# Patient Record
Sex: FEMALE | Race: WHITE | NOT HISPANIC OR LATINO | Employment: OTHER | URBAN - METROPOLITAN AREA
[De-identification: names, ages, dates, MRNs, and addresses within clinical notes are randomized per-mention and may not be internally consistent; named-entity substitution may affect disease eponyms.]

---

## 2017-01-03 ENCOUNTER — HOSPITAL ENCOUNTER (OUTPATIENT)
Dept: RADIOLOGY | Facility: MEDICAL CENTER | Age: 52
End: 2017-01-03
Attending: INTERNAL MEDICINE
Payer: MEDICAID

## 2017-01-03 ENCOUNTER — HOSPITAL ENCOUNTER (OUTPATIENT)
Dept: LAB | Facility: MEDICAL CENTER | Age: 52
End: 2017-01-03
Attending: INTERNAL MEDICINE
Payer: MEDICAID

## 2017-01-03 DIAGNOSIS — Z12.2 SCREENING FOR MALIGNANT NEOPLASM OF RESPIRATORY ORGAN: ICD-10-CM

## 2017-01-03 LAB
BUN SERPL-MCNC: 13 MG/DL (ref 8–22)
CREAT SERPL-MCNC: 0.94 MG/DL (ref 0.5–1.4)
GFR SERPL CREATININE-BSD FRML MDRD: >60 ML/MIN/1.73 M 2

## 2017-01-03 PROCEDURE — 700117 HCHG RX CONTRAST REV CODE 255: Performed by: INTERNAL MEDICINE

## 2017-01-03 PROCEDURE — 71260 CT THORAX DX C+: CPT

## 2017-01-03 RX ADMIN — IOHEXOL 100 ML: 350 INJECTION, SOLUTION INTRAVENOUS at 17:05

## 2017-01-05 ENCOUNTER — OFFICE VISIT (OUTPATIENT)
Dept: PULMONOLOGY | Facility: HOSPICE | Age: 52
End: 2017-01-05
Payer: MEDICAID

## 2017-01-05 VITALS
TEMPERATURE: 98.4 F | SYSTOLIC BLOOD PRESSURE: 160 MMHG | RESPIRATION RATE: 15 BRPM | WEIGHT: 162 LBS | HEART RATE: 124 BPM | HEIGHT: 65 IN | BODY MASS INDEX: 26.99 KG/M2 | DIASTOLIC BLOOD PRESSURE: 100 MMHG

## 2017-01-05 DIAGNOSIS — R93.89 ABNORMAL CHEST X-RAY: ICD-10-CM

## 2017-01-05 DIAGNOSIS — F17.200 TOBACCO DEPENDENCE: ICD-10-CM

## 2017-01-05 DIAGNOSIS — J42 CHRONIC BRONCHITIS, UNSPECIFIED CHRONIC BRONCHITIS TYPE (HCC): ICD-10-CM

## 2017-01-05 DIAGNOSIS — R91.8 ABNORMAL CT SCAN, LUNG: ICD-10-CM

## 2017-01-05 PROBLEM — J44.9 COPD (CHRONIC OBSTRUCTIVE PULMONARY DISEASE) (HCC): Status: ACTIVE | Noted: 2017-01-05

## 2017-01-05 PROCEDURE — 99204 OFFICE O/P NEW MOD 45 MIN: CPT | Performed by: INTERNAL MEDICINE

## 2017-01-05 RX ORDER — CYCLOBENZAPRINE HCL 10 MG
10 TABLET ORAL 2 TIMES DAILY PRN
COMMUNITY
End: 2018-05-23

## 2017-01-05 RX ORDER — HYDROCODONE BITARTRATE AND ACETAMINOPHEN 10; 325 MG/1; MG/1
1-2 TABLET ORAL EVERY 6 HOURS PRN
Status: ON HOLD | COMMUNITY
End: 2017-11-29

## 2017-01-05 NOTE — MR AVS SNAPSHOT
"        Yuki Riddle   2017 12:20 PM   Office Visit   MRN: [id number]    Department:  [facility]   Dept Phone:  [phone]    Description:  Female : 1965   Provider:  Pastor Dewitt M.D.           Reason for Visit     Establish Care Abnormal CT Results       Allergies as of 2017     Allergen Noted Reactions    Compazine 2008       Aggressive behavior and \"I feel like I'm on cocaine.\"    Sulfa Drugs 2008   Itching    Blotchy spots on chest      You were diagnosed with     Abnormal chest x-ray   [734954]       Abnormal CT scan, lung   [[id number]]       Tobacco dependence   [273306]       Chronic bronchitis, unspecified chronic bronchitis type (HCC)   [4250973]         Vital Signs     Blood Pressure Pulse Temperature Respirations Height Weight    160/100 mmHg 124 36.9 °C (98.4 °F) (Temporal) 15 1.651 m (5' 5\") 73.483 kg (162 lb)    Body Mass Index Last Menstrual Period Smoking Status             26.96 kg/m2 2002 Current Every Day Smoker         Basic Information     Date Of Birth Sex Race Ethnicity Preferred Language    1965 Female White Non- English      Your appointments     2017 10:00 AM   Pulmonary Function Test with PFT-RM3   [facility] (--)    [address]  [address] [address]  [place] [id number] [phone]            2017  2:20 PM   Established Patient Pul with A Rotation   [facility] (--)    [address]  [address]  [place] [id number] [phone]              Problem List              ICD-10-CM Priority Class Noted - Resolved    Foot fracture, left S92.902A   2013 - Present    Type 1 diabetes mellitus with hyperglycemia (HCC) E10.65 High  2013 - Present    Fibromyalgia M79.7 Low  2013 - Present    Tobacco dependence F17.200 Low  2013 - Present    Diabetic foot ulcer (HCC) E11.621, L97.509   2013 - Present    Opiate dependence (HCC) F11.20   2013 - " Present    Acute exacerbation of COPD with asthma ([facility]) J44.1, J45.901 High  3/13/2014 - Present    Intractable nausea and vomiting R11.2 High  2/9/2015 - Present    Volume depletion E86.9 High  2/9/2015 - Present    Hyponatremia E87.1 High  2/9/2015 - Present    Elevated blood pressure I10 Low  2/9/2015 - Present    Peripheral neuropathy ([facility]) G62.9   2/9/2015 - Present    Candida esophagitis ([facility]) B37.81   2/14/2015 - Present    DKA, type 1 ([facility]) E10.10 High  5/13/2016 - Present    Acute narcotic withdrawal ([facility]) F11.23   5/13/2016 - Present    Abnormal chest x-ray R93.8   1/5/2017 - Present    Abnormal CT scan, lung R91.8   1/5/2017 - Present    COPD (chronic obstructive pulmonary disease) ([facility]) J44.9   1/5/2017 - Present      Health Maintenance        Date Due Completion Dates    IMM HEP B VACCINE (1 of 3 - Primary Series) 1965 ---    DIABETES MONOFILAMTENT / LE EXAM 2/15/1967 ---    RETINAL SCREENING 8/15/1984 ---    FASTING LIPID PROFILE 8/15/1984 ---    URINE ACR / MICROALBUMIN 8/15/1984 ---    PAP SMEAR 8/15/1987 ---    MAMMOGRAM 8/15/2006 ---    A1C SCREENING 2/22/2014 8/22/2013, 1/24/2013, 6/20/2009    COLONOSCOPY 8/15/2016 ---    IMM INFLUENZA (1) 9/1/2016 11/18/2013    SERUM CREATININE 1/3/2018 1/3/2017, 5/24/2016, 5/20/2016, 5/19/2016, 5/18/2016, 5/17/2016, 5/16/2016, 5/15/2016, 5/14/2016, 5/14/2016, 5/13/2016, 5/13/2016, 5/13/2016, 2/1/2016, 1/26/2016, 9/22/2015, 6/29/2015, 2/13/2015, 2/12/2015, 2/12/2015, 2/10/2015, 2/9/2015, 1/16/2015, 8/17/2014, 3/15/2014, 3/14/2014, 3/13/2014, 3/12/2014, 11/17/2013, 11/16/2013, 8/26/2013, 8/25/2013, 8/23/2013, 8/22/2013, 8/19/2013, 6/18/2013, 3/26/2013, 1/24/2013, 1/23/2013, 6/20/2009, 12/13/2008, 5/28/2007    IMM DTaP/Tdap/Td Vaccine (2 - Td) 11/22/2024 11/22/2014            Current Immunizations     Influenza Vaccine Pediatric 11/18/2013  9:58 AM    Pneumococcal polysaccharide vaccine (PPSV-23) 3/15/2014  1:04 PM    Tdap Vaccine 11/22/2014 11:26 PM         Below and/or attached are the medications your provider expects you to take. Review all of your home medications and newly ordered medications with your provider and/or pharmacist. Follow medication instructions as directed by your provider and/or pharmacist. Please keep your medication list with you and share with your provider. Update the information when medications are discontinued, doses are changed, or new medications (including over-the-counter products) are added; and carry medication information at all times in the event of emergency situations     Allergies:  COMPAZINE - (reactions not documented)     SULFA DRUGS - Itching               Medications  Valid as of: January 05, 2017 -  1:30 PM    Generic Name Brand Name Tablet Size Instructions for use    BuPROPion HCl (Tab) WELLBUTRIN 100 MG Take 100 mg by mouth every day.        Carisoprodol (Tab) SOMA 350 MG Take 350 mg by mouth every 8 hours as needed for Muscle Spasms.        ClonazePAM (Tab) KLONOPIN 1 MG Take 1 Tab by mouth every 8 hours as needed (anxiety).        CloNIDine HCl (PATCH WEEKLY) CATAPRES 0.2 MG/24HR Apply 1 Patch to skin as directed every 7 days.        Cyclobenzaprine HCl (Tab) FLEXERIL 10 MG Take 10 mg by mouth 3 times a day as needed.        DiazePAM (Tab) VALIUM 10 MG Take 0.5-1 Tabs by mouth 2 times a day as needed for Anxiety. Indications: Feeling Anxious        Gabapentin (Cap) NEURONTIN 300 MG Take 600 mg by mouth 3 times a day.        HYDROcodone Bitartrate (CAPSULE SR 12 HR) HYDROcodone Bitartrate ER 30 MG Take 60 mg by mouth See Admin Instructions. TAKES 1 EVERY HOUR        Hydrocodone-Acetaminophen (Tab) NORCO  MG Take 1-2 Tabs by mouth every 6 hours as needed.        HydrOXYzine HCl (Tab) ATARAX 25 MG Take 25 mg by mouth every 12 hours.        Insulin Glargine (Solution) LANTUS 100 UNIT/ML Inject 20 Units as instructed every evening.        Insulin Lispro (Solution) HUMALOG 100 UNIT/ML Inject 2-10 Units as instructed  5 Times a Day. Unable to provide SS        Insulin Lispro (Solution) HUMALOG 100 UNIT/ML Inject 5 Units as instructed 3 times a day before meals.        Levothyroxine Sodium (Tab) SYNTHROID 112 MCG Take 112 mcg by mouth every day.        Lisinopril (Tab) PRINIVIL, ZESTRIL 40 MG Take 1 Tab by mouth every day.        .                 Medicines prescribed today were sent to:     [address] [address]    Phone: [phone] Fax: [phone]    Open 24 Hours?: No      Medication refill instructions:       If your prescription bottle indicates you have medication refills left, it is not necessary to call your provider’s office. Please contact your pharmacy and they will refill your medication.    If your prescription bottle indicates you do not have any refills left, you may request refills at any time through one of the following ways: The online Bookmate system (except Urgent Care), by calling your provider’s office, or by asking your pharmacy to contact your provider’s office with a refill request. Medication refills are processed only during regular business hours and may not be available until the next business day. Your provider may request additional information or to have a follow-up visit with you prior to refilling your medication.   *Please Note: Medication refills are assigned a new Rx number when refilled electronically. Your pharmacy may indicate that no refills were authorized even though a new prescription for the same medication is available at the pharmacy. Please request the medicine by name with the pharmacy before contacting your provider for a refill.        Your To Do List     Future Labs/Procedures Complete By Expires    AMB PULMONARY FUNCTION TEST/LAB  As directed 1/5/2018    CT-NEEDLE BX-LUNG-MEDIASTINUM LEFT  As directed 1/5/2018    NB-[id number] BASE TO MID-THIGH  As directed 1/5/2018         Bookmate Access Code: [email or web address]  Expires:  2/1/2017  8:51 AM    Paymentust  A secure, online tool to manage your health information     [employer]’s Baokim® is a secure, online tool that connects you to your personalized health information from the privacy of your home -- day or night - making it very easy for you to manage your healthcare. Once the activation process is completed, you can even access your medical information using the Baokim bhumika, which is available for free in the Apple Bhumika store or Google Play store.     Baokim provides the following levels of access (as shown below):   My Chart Features   [facility] Doctor [facility]  Specialists [facility]  Urgent  Care [facility]  Primary Care  Doctor   Email your healthcare team securely and privately 24/7 X X X    Manage appointments: schedule your next appointment; view details of past/upcoming appointments X      Request prescription refills. X      View recent personal medical records, including lab and immunizations X X X X   View health record, including health history, allergies, medications X X X X   Read reports about your outpatient visits, procedures, consult and ER notes X X X X   See your discharge summary, which is a recap of your hospital and/or ER visit that includes your diagnosis, lab results, and care plan. X X       How to register for Baokim:  1. Go to  [email or web address].  2. Click on the Sign Up Now box, which takes you to the New Member Sign Up page. You will need to provide the following information:  a. Enter your Baokim Access Code exactly as it appears at the top of this page. (You will not need to use this code after you’ve completed the sign-up process. If you do not sign up before the expiration date, you must request a new code.)   b. Enter your date of birth.   c. Enter your home email address.   d. Click Submit, and follow the next screen’s instructions.  3. Create a Baokim ID. This will be your Baokim login ID and cannot be changed, so think of one  that is secure and easy to remember.  4. Create a innRoad password. You can change your password at any time.  5. Enter your Password Reset Question and Answer. This can be used at a later time if you forget your password.   6. Enter your e-mail address. This allows you to receive e-mail notifications when new information is available in innRoad.  7. Click Sign Up. You can now view your health information.    For assistance activating your innRoad account, call [phone]

## 2017-01-05 NOTE — PROGRESS NOTES
CC: Abnormal CT scan of the chest    HPI:  51-year-old woman had a CT of the chest performed on 1/3/17 which showed a 2.2 cm left lower lobe spiculated mass and borderline left infrahilar adenopathy, no other infiltrates and no pleural effusions, 1.2 cm triangular shaped soft tissue anterior superior mediastinum consistent with residual thymic tissue but thymic neoplasm not excluded, mild splenomegaly, and surgical absence of gallbladder and dilatation of the biliary tree similar to previous findings and incompletely imaged.    Initial complaints included abd pain. Had URI and was seen at [facility] several times. Ultimately, was found to have the abnormal chest x-ray. Was told that she needed to see a pulmonary doctor.    Tobacco use 34 years, < 1ppd now. No occupational exposure. FH + for colon ca.    Sx include sob, cough, weak hips, sore bones, back pain, pressure in abdomen.            Patient Active Problem List    Diagnosis Date Noted   • DKA, type 1 ([facility]) 05/13/2016     Priority: High   • Intractable nausea and vomiting 02/09/2015     Priority: High   • Volume depletion 02/09/2015     Priority: High   • Hyponatremia 02/09/2015     Priority: High   • Acute exacerbation of COPD with asthma ([facility]) 03/13/2014     Priority: High   • Type 1 diabetes mellitus with hyperglycemia ([facility]) 01/23/2013     Priority: High   • Elevated blood pressure 02/09/2015     Priority: Low   • Tobacco dependence 08/26/2013     Priority: Low   • Fibromyalgia 08/23/2013     Priority: Low   • Abnormal chest x-ray 01/05/2017   • Abnormal CT scan, lung 01/05/2017   • COPD (chronic obstructive pulmonary disease) ([facility]) 01/05/2017   • Acute narcotic withdrawal ([facility]) 05/13/2016   • Candida esophagitis ([facility]) 02/14/2015   • Peripheral neuropathy ([facility]) 02/09/2015   • Opiate dependence ([facility]) 11/17/2013   • Diabetic foot ulcer ([facility]) 11/16/2013   • Foot fracture, left 01/23/2013       Past Medical History   Diagnosis Date   • Arthritis    • Fibromyalgia     • Diabetic retinopathy    • Neuropathy (HCC)    • Migraine    • Diabetes      juvenile, type I   • TIA (transient ischemic attack)    • Bronchitis    • Rheumatoid arthritis (HCC)         Past Surgical History   Procedure Laterality Date   • Tonsillectomy     • Cholecystectomy     • Primary c section     • Other       Skin disorder of unknown name   • Foreign body removal  6/18/2013     Performed by Raz Mari M.D. at SURGERY SAME DAY [facility]   • Gastroscopy with balloon dilatation  2/13/2015     Performed by Venancio Aburto M.D. at SURGERY [facility]   • Gastroscopy with biopsy  2/13/2015     Performed by Venancio Aburto M.D. at SURGERY [facility]       Family History   Problem Relation Age of Onset   • No Known Problems Sister    • No Known Problems Brother    • No Known Problems Brother    • No Known Problems Daughter    • No Known Problems Daughter        Social History     Social History   • Marital Status:      Spouse Name: N/A   • Number of Children: N/A   • Years of Education: N/A     Occupational History   • Not on file.     Social History Main Topics   • Smoking status: Current Every Day Smoker -- 0.50 packs/day for 29 years     Types: Cigarettes   • Smokeless tobacco: Never Used      Comment: 1/2 PPD   • Alcohol Use: No   • Drug Use: No   • Sexual Activity: Not on file     Other Topics Concern   • Not on file     Social History Narrative       Current Outpatient Prescriptions   Medication Sig Dispense Refill   • insulin glargine (LANTUS) 100 UNIT/ML Solution Inject 20 Units as instructed every evening. 10 mL 3   • insulin lispro (HUMALOG) 100 UNIT/ML Solution Inject 5 Units as instructed 3 times a day before meals. 10 mL 3   • clonidine (CATAPRES) 0.2 MG/24HR PATCH WEEKLY Apply 1 Patch to skin as directed every 7 days. 4 Patch 3   • lisinopril (PRINIVIL, ZESTRIL) 40 MG tablet Take 1 Tab by mouth every day. 30 Tab 2   • clonazepam (KLONOPIN) 1 MG Tab Take 1 Tab  "by mouth every 8 hours as needed (anxiety). 15 Tab 0   • carisoprodol (SOMA) 350 MG Tab Take 350 mg by mouth every 8 hours as needed for Muscle Spasms.     • buPROPion (WELLBUTRIN) 100 MG Tab Take 100 mg by mouth every day.     • gabapentin (NEURONTIN) 300 MG Cap Take 600 mg by mouth 3 times a day.     • hydrOXYzine (ATARAX) 25 MG Tab Take 25 mg by mouth every 12 hours.     • HYDROcodone Bitartrate ER 30 MG CP12 Take 60 mg by mouth See Admin Instructions. TAKES 1 EVERY HOUR     • levothyroxine (SYNTHROID) 112 MCG TABS Take 112 mcg by mouth every day.     • diazepam (VALIUM) 10 MG tablet Take 0.5-1 Tabs by mouth 2 times a day as needed for Anxiety. Indications: Feeling Anxious 30 Each 0   • insulin lispro (HUMALOG) 100 UNIT/ML SOLN Inject 2-10 Units as instructed 5 Times a Day. Unable to provide SS       No current facility-administered medications for this visit.    \"CURRENT RX\"    ALLERGIES: Compazine and Sulfa drugs    ROS  Per history of present illness otherwise negative      PHYSICAL EXAM  Accompanied by daughter who was filling out forms  /100 mmHg  Pulse 124  Temp(Src) 36.9 °C (98.4 °F) (Temporal)  Resp 15  Ht 1.651 m (5' 5\")  Wt 73.483 kg (162 lb)  BMI 26.96 kg/m2  LMP 01/01/2002  Appearance: Well-nourished, well-developed, no acute distress; tearful; circumloquacious, speech pressure, anxious  Eyes:  PERRLA, EOMI  Hearing:  Grossly intact  Nose:  Normal, no lesions or deformities, turbinates moist  Oropharynx:  Tongue normal, posterior pharynx without erythema or exudate  Mallampati classification:  2  Neck: Supple, trachea midline, no masses  Respiratory effort:  No intercostal retractions or use of accessory muscles  Lung auscultation:  No wheezes rhonchi rubs or rales  Cardiac auscultation:  No murmurs, rubs, or gallops, no regular rhythm, normal rate  Abdomen:  No tenderness, no organomegaly  Extremities:  No cyanosis, clubbing, edema  Gait and Station:  Normal  Digits and nails: No " clubbing, cyanosis, petechiae, or nodes  Musculoskeletal:  Grossly normal  Skin:  No rashes  Orientation:  Oriented time, place, and person  Mood and affect:  No depression, agitation; anxious  Judgment:  Intact    PROBLEMS:    1. Abnormal chest x-ray      2. Abnormal CT scan, lung      3. Tobacco dependence      4. Chronic bronchitis, unspecified chronic bronchitis type (HCC)    5. Hypertension today, followed by Dr. Bean        PLAN:   51-year-old woman had a CT of the chest performed on 1/3/17 which showed a 2.2 cm LEFT lower lobe spiculated mass and borderline left infrahilar adenopathy, no other infiltrates and no pleural effusions, 1.2 cm triangular shaped soft tissue anterior superior mediastinum consistent with residual thymic tissue but thymic neoplasm not excluded, mild splenomegaly, and surgical absence of gallbladder and dilatation of the biliary tree similar to previous findings and incompletely imaged.    The patient most likely has a primary lung cancer. We will schedule her to have a needle biopsy of the spiculated lesion, a PET scan, and complete PFTs. She'll return thereafter for results review and a comprehensive plan.  Hopefully, she has non-small cell lung cancer, has no lymphadenopathy or distal metastases, and has satisfactory lung function rendering her a surgical candidate.      Return for follow up visit with pulmonary physician.

## 2017-01-06 ENCOUNTER — TELEPHONE (OUTPATIENT)
Dept: PULMONOLOGY | Facility: HOSPICE | Age: 52
End: 2017-01-06

## 2017-01-06 NOTE — TELEPHONE ENCOUNTER
Ivania with [facility] called states that the order/ request for the CT-Needle BX Lung was denied, states that a Bronchoscopy must be tried first and once tried and if failed then they can try the CT-Needle BX lll Lung.  Ivania will scan the denied request into media.  Please advise!

## 2017-01-09 ENCOUNTER — TELEPHONE (OUTPATIENT)
Dept: PULMONOLOGY | Facility: HOSPICE | Age: 52
End: 2017-01-09

## 2017-01-09 DIAGNOSIS — R91.8 ABNORMAL CT SCAN OF LUNG: ICD-10-CM

## 2017-01-09 NOTE — TELEPHONE ENCOUNTER
Pt was ordered a CT guided bx which was denied by the radiologist. They think a Bronchoscopy is more appropriate. Please place order if that's what you want?/ap

## 2017-01-09 NOTE — TELEPHONE ENCOUNTER
[phone]  I called Yuki to let her know her insurance denied the Bx and that we would be doing a Bronch first. I let her know Janis would be contacting her for scheduling. Pt. Informed me she was going to go to the ER due to symptoms of cough is getting worse, neck pain, and feeling like she is drowning.    Please put in an order for Bronch.

## 2017-01-13 ENCOUNTER — TELEPHONE (OUTPATIENT)
Dept: PULMONOLOGY | Facility: HOSPICE | Age: 52
End: 2017-01-13

## 2017-01-13 ENCOUNTER — HOSPITAL ENCOUNTER (OUTPATIENT)
Facility: MEDICAL CENTER | Age: 52
End: 2017-01-13
Attending: INTERNAL MEDICINE | Admitting: INTERNAL MEDICINE
Payer: MEDICAID

## 2017-01-13 ENCOUNTER — HOSPITAL ENCOUNTER (OUTPATIENT)
Dept: RADIOLOGY | Facility: MEDICAL CENTER | Age: 52
End: 2017-01-13
Attending: INTERNAL MEDICINE
Payer: MEDICAID

## 2017-01-13 DIAGNOSIS — R91.8 ABNORMAL CT SCAN, LUNG: ICD-10-CM

## 2017-01-13 PROCEDURE — A9552 F18 FDG: HCPCS

## 2017-01-13 NOTE — TELEPHONE ENCOUNTER
Pt had PET scan today. She is very nervous and would like a call before the weekend. She has a pending Bronch with Sony/david

## 2017-01-13 NOTE — TELEPHONE ENCOUNTER
Contacted patient and reviewed results with her showing no uptake, but understands there is always a change there could be a false negative.  She knows to continue through with current treatment as dicussed at last OV with bronchoscopy and PFT

## 2017-01-18 ENCOUNTER — OFFICE VISIT (OUTPATIENT)
Dept: PULMONOLOGY | Facility: HOSPICE | Age: 52
End: 2017-01-18

## 2017-01-18 VITALS — HEIGHT: 65 IN | BODY MASS INDEX: 26.66 KG/M2 | WEIGHT: 160 LBS

## 2017-01-18 DIAGNOSIS — F17.200 TOBACCO DEPENDENCE: ICD-10-CM

## 2017-01-18 DIAGNOSIS — J42 CHRONIC BRONCHITIS, UNSPECIFIED CHRONIC BRONCHITIS TYPE (HCC): ICD-10-CM

## 2017-01-18 ASSESSMENT — PULMONARY FUNCTION TESTS
FVC_PREDICTED: 3.65
FEV1_PERCENT_PREDICTED: 79
FEV1/FVC: 78
FEV1: 2.35
FEV1_PERCENT_PREDICTED: 81
FEV1/FVC_PERCENT_PREDICTED: 103
FVC: 2.95
FVC_PERCENT_PREDICTED: 79
FVC: 2.91
FEV1_PERCENT_CHANGE: 2
FEV1: 2.29
FEV1/FVC_PERCENT_CHANGE: -200
FEV1/FVC_PERCENT_PREDICTED: 79
FVC_PERCENT_PREDICTED: 80
FEV1_PREDICTED: 2.89
FEV1/FVC_PERCENT_PREDICTED: 99
FEV1/FVC: 80.76
FEV1_PERCENT_CHANGE: -1

## 2017-01-18 NOTE — PROCEDURES
Technician: MARK Gutiérrez    Technician Comment:  Good patient effort & cooperation.  The results of this test meet the ATS/ERS standards for acceptability & reproducibility.  Test was performed on the [employer] Body Plethysmograph-Elite DX system.  Predicted values were [facility]-3 for spirometry, [facility] for DLCO, ITS for Lung Volumes.  The DLCO was uncorrected for Hgb.  A bronchodilator of Ventolin HFA -2puffs via spacer administered.      The FVC is 2.91 L or 79%, FEV1 is 2.35 L or 81%, FEV1/FVC 81%. TLC 97%. DLCO 97%. ERV of 70%.    Interpretation:  No significant obstructive or restrictive ventilatory defects.   Normal gas transfer.  Normal flow volume loop.  Reduced expiratory reserve volume secondary to obesity.

## 2017-01-18 NOTE — MR AVS SNAPSHOT
"        Yuki Riddle   2017 10:00 AM   Office Visit   MRN: [id number]    Department:  [facility]   Dept Phone:  [phone]    Description:  Female : 1965   Provider:  [id number]           Allergies as of 2017     Allergen Noted Reactions    Compazine 2008       Aggressive behavior and \"I feel like I'm on cocaine.\"    Sulfa Drugs 2008   Itching    Blotchy spots on chest      You were diagnosed with     Tobacco dependence   [200238]       Chronic bronchitis, unspecified chronic bronchitis type (CMS-HCC)   [3513115]         Vital Signs     Height Weight Body Mass Index Last Menstrual Period Smoking Status       1.651 m (5' 5\") 72.576 kg (160 lb) 26.63 kg/m2 2002 Current Every Day Smoker       Basic Information     Date Of Birth Sex Race Ethnicity Preferred Language    1965 Female White Non- English      Your appointments     2017 11:20 AM   Established Patient Pul with A Rotation   [facility] (--)    [address] [place] [address] [address]  [facility] [id number] [phone]              Problem List              ICD-10-CM Priority Class Noted - Resolved    Foot fracture, left S92.902A   2013 - Present    Type 1 diabetes mellitus with hyperglycemia (CMS-HCC) E10.65 High  2013 - Present    Fibromyalgia M79.7 Low  2013 - Present    Tobacco dependence F17.200 Low  2013 - Present    Diabetic foot ulcer (CMS-HCC) E11.621, L97.509   2013 - Present    Opiate dependence (CMS-HCC) F11.20   2013 - Present    Acute exacerbation of COPD with asthma (CMS-HCC) J44.1, J45.901 High  3/13/2014 - Present    Intractable nausea and vomiting R11.2 High  2015 - Present    Volume depletion E86.9 High  2015 - Present    Hyponatremia E87.1 High  2015 - Present    Elevated blood pressure I10 Low  2015 - Present    Peripheral neuropathy (CMS-HCC) G62.9   2015 - Present    Candida esophagitis (CMS-HCC) B37.81   " 2/14/2015 - Present    DKA, type 1 (CMS-HCC) E10.10 High  5/13/2016 - Present    Acute narcotic withdrawal (CMS-HCC) F11.23   5/13/2016 - Present    Abnormal chest x-ray R93.8   1/5/2017 - Present    Abnormal CT scan, lung R91.8   1/5/2017 - Present    COPD (chronic obstructive pulmonary disease) (CMS-HCC) J44.9   1/5/2017 - Present      Health Maintenance        Date Due Completion Dates    IMM HEP B VACCINE (1 of 3 - Primary Series) 1965 ---    DIABETES MONOFILAMENT / LE EXAM 2/15/1967 ---    RETINAL SCREENING 8/15/1984 ---    FASTING LIPID PROFILE 8/15/1984 ---    URINE ACR / MICROALBUMIN 8/15/1984 ---    PAP SMEAR 8/15/1987 ---    MAMMOGRAM 8/15/2006 ---    A1C SCREENING 2/22/2014 8/22/2013, 1/24/2013, 6/20/2009    COLONOSCOPY 8/15/2016 ---    IMM INFLUENZA (1) 9/1/2016 11/18/2013    SERUM CREATININE 1/3/2018 1/3/2017, 5/24/2016, 5/20/2016, 5/19/2016, 5/18/2016, 5/17/2016, 5/16/2016, 5/15/2016, 5/14/2016, 5/14/2016, 5/13/2016, 5/13/2016, 5/13/2016, 2/1/2016, 1/26/2016, 9/22/2015, 6/29/2015, 2/13/2015, 2/12/2015, 2/12/2015, 2/10/2015, 2/9/2015, 1/16/2015, 8/17/2014, 3/15/2014, 3/14/2014, 3/13/2014, 3/12/2014, 11/17/2013, 11/16/2013, 8/26/2013, 8/25/2013, 8/23/2013, 8/22/2013, 8/19/2013, 6/18/2013, 3/26/2013, 1/24/2013, 1/23/2013, 6/20/2009, 12/13/2008, 5/28/2007    IMM DTaP/Tdap/Td Vaccine (2 - Td) 11/22/2024 11/22/2014            Current Immunizations     Influenza Vaccine Pediatric 11/18/2013  9:58 AM    Pneumococcal polysaccharide vaccine (PPSV-23) 3/15/2014  1:04 PM    Tdap Vaccine 11/22/2014 11:26 PM      Below and/or attached are the medications your provider expects you to take. Review all of your home medications and newly ordered medications with your provider and/or pharmacist. Follow medication instructions as directed by your provider and/or pharmacist. Please keep your medication list with you and share with your provider. Update the information when medications are discontinued, doses are  changed, or new medications (including over-the-counter products) are added; and carry medication information at all times in the event of emergency situations     Allergies:  COMPAZINE - (reactions not documented)     SULFA DRUGS - Itching               Medications  Valid as of: January 18, 2017 - 10:47 AM    Generic Name Brand Name Tablet Size Instructions for use    BuPROPion HCl (Tab) WELLBUTRIN 100 MG Take 100 mg by mouth every day.        Carisoprodol (Tab) SOMA 350 MG Take 350 mg by mouth every 8 hours as needed for Muscle Spasms.        ClonazePAM (Tab) KLONOPIN 1 MG Take 1 Tab by mouth every 8 hours as needed (anxiety).        CloNIDine HCl (PATCH WEEKLY) CATAPRES 0.2 MG/24HR Apply 1 Patch to skin as directed every 7 days.        Cyclobenzaprine HCl (Tab) FLEXERIL 10 MG Take 10 mg by mouth 3 times a day as needed.        DiazePAM (Tab) VALIUM 10 MG Take 0.5-1 Tabs by mouth 2 times a day as needed for Anxiety. Indications: Feeling Anxious        Gabapentin (Cap) NEURONTIN 300 MG Take 600 mg by mouth 3 times a day.        HYDROcodone Bitartrate (CAPSULE SR 12 HR) HYDROcodone Bitartrate ER 30 MG Take 60 mg by mouth See Admin Instructions. TAKES 1 EVERY HOUR        Hydrocodone-Acetaminophen (Tab) NORCO  MG Take 1-2 Tabs by mouth every 6 hours as needed.        HydrOXYzine HCl (Tab) ATARAX 25 MG Take 25 mg by mouth every 12 hours.        Insulin Glargine (Solution) LANTUS 100 UNIT/ML Inject 20 Units as instructed every evening.        Insulin Lispro (Solution) HUMALOG 100 UNIT/ML Inject 2-10 Units as instructed 5 Times a Day. Unable to provide SS        Insulin Lispro (Solution) HUMALOG 100 UNIT/ML Inject 5 Units as instructed 3 times a day before meals.        Levothyroxine Sodium (Tab) SYNTHROID 112 MCG Take 112 mcg by mouth every day.        Lisinopril (Tab) PRINIVIL, ZESTRIL 40 MG Take 1 Tab by mouth every day.        .                 Medicines prescribed today were sent to:     [facility] [place]  [place] - [address] [address]    Phone: [phone] Fax: [phone]    Open 24 Hours?: No      Medication refill instructions:       If your prescription bottle indicates you have medication refills left, it is not necessary to call your provider’s office. Please contact your pharmacy and they will refill your medication.    If your prescription bottle indicates you do not have any refills left, you may request refills at any time through one of the following ways: The online [employer] system (except Urgent Care), by calling your provider’s office, or by asking your pharmacy to contact your provider’s office with a refill request. Medication refills are processed only during regular business hours and may not be available until the next business day. Your provider may request additional information or to have a follow-up visit with you prior to refilling your medication.   *Please Note: Medication refills are assigned a new Rx number when refilled electronically. Your pharmacy may indicate that no refills were authorized even though a new prescription for the same medication is available at the pharmacy. Please request the medicine by name with the pharmacy before contacting your provider for a refill.           [employer] Access Code: [email or web address]  Expires: 2/1/2017  8:51 AM    [employer]  A secure, online tool to manage your health information     [employer]’s [employer]® is a secure, online tool that connects you to your personalized health information from the privacy of your home -- day or night - making it very easy for you to manage your healthcare. Once the activation process is completed, you can even access your medical information using the [employer] bhumika, which is available for free in the Apple Bhumika store or Google Play store.     [employer] provides the following levels of access (as shown below):   My Chart Features   [facility] Primary Care Doctor [facility]  Specialists [facility]  Urgent  Care  [facility]  Primary Care  Doctor   Email your healthcare team securely and privately 24/7 X X X    Manage appointments: schedule your next appointment; view details of past/upcoming appointments X      Request prescription refills. X      View recent personal medical records, including lab and immunizations X X X X   View health record, including health history, allergies, medications X X X X   Read reports about your outpatient visits, procedures, consult and ER notes X X X X   See your discharge summary, which is a recap of your hospital and/or ER visit that includes your diagnosis, lab results, and care plan. X X       How to register for [employer]:  1. Go to  [email or web address].  2. Click on the Sign Up Now box, which takes you to the New Member Sign Up page. You will need to provide the following information:  a. Enter your [employer] Access Code exactly as it appears at the top of this page. (You will not need to use this code after you’ve completed the sign-up process. If you do not sign up before the expiration date, you must request a new code.)   b. Enter your date of birth.   c. Enter your home email address.   d. Click Submit, and follow the next screen’s instructions.  3. Create a [employer] ID. This will be your [employer] login ID and cannot be changed, so think of one that is secure and easy to remember.  4. Create a [employer] password. You can change your password at any time.  5. Enter your Password Reset Question and Answer. This can be used at a later time if you forget your password.   6. Enter your e-mail address. This allows you to receive e-mail notifications when new information is available in [employer].  7. Click Sign Up. You can now view your health information.    For assistance activating your [employer] account, call [phone]

## 2017-01-20 VITALS — WEIGHT: 162.7 LBS | BODY MASS INDEX: 27.11 KG/M2 | HEIGHT: 65 IN

## 2017-01-20 DIAGNOSIS — Z01.812 PRE-PROCEDURAL LABORATORY EXAMINATION: ICD-10-CM

## 2017-01-20 LAB
ANION GAP SERPL CALC-SCNC: 7 MMOL/L (ref 0–11.9)
BUN SERPL-MCNC: 18 MG/DL (ref 8–22)
CALCIUM SERPL-MCNC: 9.2 MG/DL (ref 8.5–10.5)
CHLORIDE SERPL-SCNC: 104 MMOL/L (ref 96–112)
CO2 SERPL-SCNC: 27 MMOL/L (ref 20–33)
CREAT SERPL-MCNC: 0.86 MG/DL (ref 0.5–1.4)
GFR SERPL CREATININE-BSD FRML MDRD: >60 ML/MIN/1.73 M 2
GLUCOSE SERPL-MCNC: 127 MG/DL (ref 65–99)
POTASSIUM SERPL-SCNC: 4.4 MMOL/L (ref 3.6–5.5)
SODIUM SERPL-SCNC: 138 MMOL/L (ref 135–145)

## 2017-01-20 PROCEDURE — 80048 BASIC METABOLIC PNL TOTAL CA: CPT

## 2017-01-20 PROCEDURE — 36415 COLL VENOUS BLD VENIPUNCTURE: CPT

## 2017-01-20 RX ORDER — HYDROCODONE BITARTRATE 50 MG/1
CAPSULE, EXTENDED RELEASE ORAL EVERY 12 HOURS
COMMUNITY
End: 2017-01-24 | Stop reason: HOSPADM

## 2017-01-21 NOTE — OR NURSING
Here for pre-admit. Patient is a very brittle diabetic, under the care of Dr. Quesada.  Procedure is at 1pm, coming in at 11. Katiuska is very concerned about sugars being out of control.  I called Emelina at [facility], she will have anesthesiologist call patient night before procedure.

## 2017-02-03 ENCOUNTER — OFFICE VISIT (OUTPATIENT)
Dept: PULMONOLOGY | Facility: HOSPICE | Age: 52
End: 2017-02-03
Payer: MEDICAID

## 2017-02-03 VITALS
HEIGHT: 65 IN | RESPIRATION RATE: 16 BRPM | BODY MASS INDEX: 27.52 KG/M2 | SYSTOLIC BLOOD PRESSURE: 138 MMHG | TEMPERATURE: 98.2 F | OXYGEN SATURATION: 94 % | HEART RATE: 93 BPM | WEIGHT: 165.2 LBS | DIASTOLIC BLOOD PRESSURE: 82 MMHG

## 2017-02-03 DIAGNOSIS — E10.65 TYPE 1 DIABETES MELLITUS WITH HYPERGLYCEMIA (HCC): ICD-10-CM

## 2017-02-03 DIAGNOSIS — R91.8 ABNORMAL CT SCAN, LUNG: ICD-10-CM

## 2017-02-03 DIAGNOSIS — R93.89 ABNORMAL CHEST X-RAY: ICD-10-CM

## 2017-02-03 DIAGNOSIS — J42 CHRONIC BRONCHITIS, UNSPECIFIED CHRONIC BRONCHITIS TYPE (HCC): ICD-10-CM

## 2017-02-03 PROCEDURE — 99214 OFFICE O/P EST MOD 30 MIN: CPT | Performed by: INTERNAL MEDICINE

## 2017-02-03 NOTE — MR AVS SNAPSHOT
"        Yuki Riddle   2/3/2017 11:20 AM   Office Visit   MRN: [id number]    Department:  [facility]   Dept Phone:  [phone]    Description:  Female : 1965   Provider:  Pastor Dewitt M.D.           Reason for Visit     Follow-Up           Allergies as of 2/3/2017     Allergen Noted Reactions    Compazine 2008       Aggressive behavior     Sulfa Drugs 2008   Itching    Blotchy spots on chest      You were diagnosed with     Abnormal CT scan, lung   [373716]       Abnormal chest x-ray   [[id number]]       Chronic bronchitis, unspecified chronic bronchitis type (CMS-HCC)   [8711008]       Type 1 diabetes mellitus with hyperglycemia (CMS-HCC)   [800071]         Vital Signs     Blood Pressure Pulse Temperature Respirations Height Weight    138/82 mmHg 93 36.8 °C (98.2 °F) 16 1.651 m (5' 5\") 74.934 kg (165 lb 3.2 oz)    Body Mass Index Oxygen Saturation Last Menstrual Period Smoking Status          27.49 kg/m2 94% 2002 Current Every Day Smoker        Basic Information     Date Of Birth Sex Race Ethnicity Preferred Language    1965 Female White Non- English      Problem List              ICD-10-CM Priority Class Noted - Resolved    Foot fracture, left S92.902A   2013 - Present    Type 1 diabetes mellitus with hyperglycemia (CMS-HCC) E10.65 High  2013 - Present    Fibromyalgia M79.7 Low  2013 - Present    Tobacco dependence F17.200 Low  2013 - Present    Diabetic foot ulcer (CMS-HCC) E11.621, L97.509   2013 - Present    Opiate dependence (CMS-HCC) F11.20   2013 - Present    Acute exacerbation of COPD with asthma (CMS-HCC) J44.1, J45.901 High  3/13/2014 - Present    Intractable nausea and vomiting R11.2 High  2015 - Present    Volume depletion E86.9 High  2015 - Present    Hyponatremia E87.1 High  2015 - Present    Elevated blood pressure I10 Low  2015 - Present    Peripheral neuropathy (CMS-HCC) G62.9   2015 - " Present    Candida esophagitis (CMS-HCC) B37.81   2/14/2015 - Present    DKA, type 1 (CMS-HCC) E10.10 High  5/13/2016 - Present    Acute narcotic withdrawal (CMS-HCC) F11.23   5/13/2016 - Present    Abnormal chest x-ray R93.8   1/5/2017 - Present    Abnormal CT scan, lung R91.8   1/5/2017 - Present    COPD (chronic obstructive pulmonary disease) (CMS-HCC) J44.9   1/5/2017 - Present      Health Maintenance        Date Due Completion Dates    IMM HEP B VACCINE (1 of 3 - Primary Series) 1965 ---    DIABETES MONOFILAMENT / LE EXAM 2/15/1967 ---    RETINAL SCREENING 8/15/1984 ---    FASTING LIPID PROFILE 8/15/1984 ---    URINE ACR / MICROALBUMIN 8/15/1984 ---    PAP SMEAR 8/15/1987 ---    MAMMOGRAM 8/15/2006 ---    A1C SCREENING 2/22/2014 8/22/2013, 1/24/2013, 6/20/2009    COLONOSCOPY 8/15/2016 ---    IMM INFLUENZA (1) 9/1/2016 11/18/2013    SERUM CREATININE 1/20/2018 1/20/2017, 1/3/2017, 5/24/2016, 5/20/2016, 5/19/2016, 5/18/2016, 5/17/2016, 5/16/2016, 5/15/2016, 5/14/2016, 5/14/2016, 5/13/2016, 5/13/2016, 5/13/2016, 2/1/2016, 1/26/2016, 9/22/2015, 6/29/2015, 2/13/2015, 2/12/2015, 2/12/2015, 2/10/2015, 2/9/2015, 1/16/2015, 8/17/2014, 3/15/2014, 3/14/2014, 3/13/2014, 3/12/2014, 11/17/2013, 11/16/2013, 8/26/2013, 8/25/2013, 8/23/2013, 8/22/2013, 8/19/2013, 6/18/2013, 3/26/2013, 1/24/2013, 1/23/2013, 6/20/2009, 12/13/2008, 5/28/2007    IMM DTaP/Tdap/Td Vaccine (2 - Td) 11/22/2024 11/22/2014            Current Immunizations     Influenza Vaccine Pediatric 11/18/2013  9:58 AM    Pneumococcal polysaccharide vaccine (PPSV-23) 3/15/2014  1:04 PM    Tdap Vaccine 11/22/2014 11:26 PM      Below and/or attached are the medications your provider expects you to take. Review all of your home medications and newly ordered medications with your provider and/or pharmacist. Follow medication instructions as directed by your provider and/or pharmacist. Please keep your medication list with you and share with your provider. Update the  information when medications are discontinued, doses are changed, or new medications (including over-the-counter products) are added; and carry medication information at all times in the event of emergency situations     Allergies:  COMPAZINE - (reactions not documented)     SULFA DRUGS - Itching               Medications  Valid as of: February 03, 2017 - 11:54 AM    Generic Name Brand Name Tablet Size Instructions for use    BuPROPion HCl (Tab) WELLBUTRIN 100 MG Take 100 mg by mouth every day.        Carisoprodol (Tab) SOMA 350 MG Take 350 mg by mouth every 8 hours as needed for Muscle Spasms.        ClonazePAM (Tab) KLONOPIN 1 MG Take 1 Tab by mouth every 8 hours as needed (anxiety).        Cyclobenzaprine HCl (Tab) FLEXERIL 10 MG Take 10 mg by mouth 3 times a day as needed.        Gabapentin (Cap) NEURONTIN 300 MG Take 600 mg by mouth 3 times a day.        Hydrocodone-Acetaminophen (Tab) NORCO  MG Take 1-2 Tabs by mouth every 6 hours as needed.        HydrOXYzine HCl (Tab) ATARAX 25 MG Take 25 mg by mouth every 12 hours.        Insulin Lispro (Solution) HUMALOG 100 UNIT/ML Inject 2-10 Units as instructed 5 Times a Day. Unable to provide SS        .                 Medicines prescribed today were sent to:     [facility] - [place] - [address] [place], [place] [address]    [address] [address]    Phone: [phone] Fax: [phone]    Open 24 Hours?: No      Medication refill instructions:       If your prescription bottle indicates you have medication refills left, it is not necessary to call your provider’s office. Please contact your pharmacy and they will refill your medication.    If your prescription bottle indicates you do not have any refills left, you may request refills at any time through one of the following ways: The online [employer] system (except Urgent Care), by calling your provider’s office, or by asking your pharmacy to contact your provider’s office with a refill  request. Medication refills are processed only during regular business hours and may not be available until the next business day. Your provider may request additional information or to have a follow-up visit with you prior to refilling your medication.   *Please Note: Medication refills are assigned a new Rx number when refilled electronically. Your pharmacy may indicate that no refills were authorized even though a new prescription for the same medication is available at the pharmacy. Please request the medicine by name with the pharmacy before contacting your provider for a refill.        Your To Do List     Future Labs/Procedures Complete By Expires    CT-CHEST (THORAX) W/O  5/3/2017 (Approximate) 2/3/2018         MyChart Status: Patient Declined

## 2017-02-03 NOTE — PROGRESS NOTES
CC: PET scan result      HPI:  Seen in early January:  51-year-old woman had a CT of the chest performed on 1/3/17 which showed a 2.2 cm left lower lobe spiculated mass and borderline left infrahilar adenopathy, no other infiltrates and no pleural effusions, 1.2 cm triangular shaped soft tissue anterior superior mediastinum consistent with residual thymic tissue but thymic neoplasm not excluded, mild splenomegaly, and surgical absence of gallbladder and dilatation of the biliary tree similar to previous findings and incompletely imaged.    Initial complaints included abd pain. Had URI and was seen at [facility] several times. Ultimately, was found to have the abnormal chest x-ray. Was told that she needed to see a pulmonary doctor.    Tobacco use 34 years, < 1ppd now. No occupational exposure. FH + for colon ca.    Sx include sob, cough, weak hips, sore bones, back pain, pressure in abdomen.    PET/CT was performed on 1/13:  1. No evidence of abnormal FDG accumulation.  2. Smaller nodular opacity in the left lower lobe does not demonstrate uptake, likely resolving infection/inflammation. Continued follow-up with CT to ensure complete resolution.         Accompanied today by her daughter and mother. Reviewed PFTs. Review of the PET scan. The patient is concerned that the PET scan represents a false negative. However in that there was no abnormal FDG accumulation and the lesion was smaller I suspect that this is a true negative present false negative. We will need to follow the CT scan to ensure that the lesion continues to resolve or at least does not enlarge.                              Patient Active Problem List    Diagnosis Date Noted   • DKA, type 1 ([facility]) 05/13/2016     Priority: High   • Intractable nausea and vomiting 02/09/2015     Priority: High   • Volume depletion 02/09/2015     Priority: High   • Hyponatremia 02/09/2015     Priority: High   • Acute exacerbation of COPD with asthma (CMS-HCC) 03/13/2014      Priority: High   • Type 1 diabetes mellitus with hyperglycemia (CMS-HCC) 01/23/2013     Priority: High   • Elevated blood pressure 02/09/2015     Priority: Low   • Tobacco dependence 08/26/2013     Priority: Low   • Fibromyalgia 08/23/2013     Priority: Low   • Abnormal chest x-ray 01/05/2017   • Abnormal CT scan, lung 01/05/2017   • COPD (chronic obstructive pulmonary disease) (CMS-HCC) 01/05/2017   • Acute narcotic withdrawal (CMS-HCC) 05/13/2016   • Candida esophagitis (CMS-HCC) 02/14/2015   • Peripheral neuropathy (CMS-HCC) 02/09/2015   • Opiate dependence (CMS-HCC) 11/17/2013   • Diabetic foot ulcer (CMS-HCC) 11/16/2013   • Foot fracture, left 01/23/2013       Past Medical History   Diagnosis Date   • Fibromyalgia    • Diabetic retinopathy    • Neuropathy (CMS-HCC)    • Migraine    • Diabetes      juvenile, type I   • TIA (transient ischemic attack)    • Urinary incontinence    • Hypertension    • Heart burn    • Indigestion    • Urinary bladder disorder    • Breath shortness    • Cataract    • Psychiatric problem    • Bowel habit changes      diarrhea   • Dental disorder      upper and lower dentures   • Asthma      no inhalers   • Bronchitis 12/2016   • Pneumonia 1993   • Cold 12/3/2016   • Sleep apnea      cpap ordered, doesn't use   • Arthritis    • Rheumatoid arthritis (CMS-HCC)         Past Surgical History   Procedure Laterality Date   • Tonsillectomy     • Cholecystectomy     • Primary c section     • Other       Skin disorder of unknown name   • Foreign body removal  6/18/2013     Performed by Raz Mari M.D. at [facility]   • Gastroscopy with balloon dilatation  2/13/2015     Performed by Venancio Aburto M.D. at [facility]   • Gastroscopy with biopsy  2/13/2015     Performed by Venancio Aburto M.D. at [facility]       Family History   Problem Relation Age of Onset   • No Known Problems Sister    • No Known Problems Brother    • No Known  "Problems Brother    • No Known Problems Daughter    • No Known Problems Daughter        Social History     Social History   • Marital Status:      Spouse Name: N/A   • Number of Children: N/A   • Years of Education: N/A     Occupational History   • Not on file.     Social History Main Topics   • Smoking status: Current Every Day Smoker -- 0.50 packs/day for 29 years     Types: Cigarettes   • Smokeless tobacco: Never Used      Comment: 1/2 PPD   • Alcohol Use: No   • Drug Use: No   • Sexual Activity: Not on file     Other Topics Concern   • Not on file     Social History Narrative       Current Outpatient Prescriptions   Medication Sig Dispense Refill   • cyclobenzaprine (FLEXERIL) 10 MG Tab Take 10 mg by mouth 3 times a day as needed.     • hydrocodone/acetaminophen (NORCO)  MG Tab Take 1-2 Tabs by mouth every 6 hours as needed.     • carisoprodol (SOMA) 350 MG Tab Take 350 mg by mouth every 8 hours as needed for Muscle Spasms.     • gabapentin (NEURONTIN) 300 MG Cap Take 600 mg by mouth 3 times a day.     • hydrOXYzine (ATARAX) 25 MG Tab Take 25 mg by mouth every 12 hours.     • insulin lispro (HUMALOG) 100 UNIT/ML SOLN Inject 2-10 Units as instructed 5 Times a Day. Unable to provide SS     • clonazepam (KLONOPIN) 1 MG Tab Take 1 Tab by mouth every 8 hours as needed (anxiety). 15 Tab 0   • buPROPion (WELLBUTRIN) 100 MG Tab Take 100 mg by mouth every day.       No current facility-administered medications for this visit.    \"CURRENT RX\"    ALLERGIES: Compazine and Sulfa drugs    ROS  Per history of present illness otherwise negative    PHYSICAL EXAM    /82 mmHg  Pulse 93  Temp(Src) 36.8 °C (98.2 °F)  Resp 16  Ht 1.651 m (5' 5\")  Wt 74.934 kg (165 lb 3.2 oz)  BMI 27.49 kg/m2  SpO2 94%  LMP 01/01/2002  Appearance: Well-nourished, well-developed, no acute distress; tearful; circumloquacious, speech pressure, anxious  Eyes:  PERRLA, EOMI  Hearing:  Grossly intact  Nose:  Normal, no lesions or " deformities, turbinates moist  Oropharynx:  Tongue normal, posterior pharynx without erythema or exudate  Mallampati classification:  2  Neck: Supple, trachea midline, no masses  Respiratory effort:  No intercostal retractions or use of accessory muscles  Lung auscultation:  No wheezes rhonchi rubs or rales  Cardiac auscultation:  No murmurs, rubs, or gallops, no regular rhythm, normal rate  Abdomen:  No tenderness, no organomegaly  Extremities:  No cyanosis, clubbing, edema  Gait and Station:  Normal  Digits and nails: No clubbing, cyanosis, petechiae, or nodes  Musculoskeletal:  Grossly normal  Skin:  No rashes  Orientation:  Oriented time, place, and person  Mood and affect:  No depression, agitation; anxious  Judgment:  Intact    PROBLEMS:  1.1. Abnormal CT scan, lung    - CT-CHEST (THORAX) W/O; Future    2. Abnormal chest x-ray      3. Chronic bronchitis, unspecified chronic bronchitis type (CMS-HCC)    - AMB PULMONARY FUNCTION TEST/LAB; Future    4. Type 1 diabetes mellitus with hyperglycemia (CMS-HCC)        PLAN:   We will perform a CT of the chest without contrast in May and she will return to clinic for a review thereafter.  Return for after imaging test, with PFT.

## 2017-02-24 ENCOUNTER — APPOINTMENT (OUTPATIENT)
Dept: RADIOLOGY | Facility: MEDICAL CENTER | Age: 52
End: 2017-02-24
Attending: EMERGENCY MEDICINE
Payer: MEDICAID

## 2017-02-24 ENCOUNTER — HOSPITAL ENCOUNTER (EMERGENCY)
Facility: MEDICAL CENTER | Age: 52
End: 2017-02-24
Attending: EMERGENCY MEDICINE
Payer: MEDICAID

## 2017-02-24 VITALS
HEART RATE: 92 BPM | SYSTOLIC BLOOD PRESSURE: 180 MMHG | DIASTOLIC BLOOD PRESSURE: 95 MMHG | OXYGEN SATURATION: 94 % | RESPIRATION RATE: 19 BRPM | TEMPERATURE: 99.4 F | WEIGHT: 165.12 LBS | BODY MASS INDEX: 27.48 KG/M2

## 2017-02-24 DIAGNOSIS — N30.90 CYSTITIS WITHOUT HEMATURIA: ICD-10-CM

## 2017-02-24 LAB
ALBUMIN SERPL BCP-MCNC: 3.3 G/DL (ref 3.2–4.9)
ALBUMIN/GLOB SERPL: 1.1 G/DL
ALP SERPL-CCNC: 101 U/L (ref 30–99)
ALT SERPL-CCNC: 43 U/L (ref 2–50)
ANION GAP SERPL CALC-SCNC: 8 MMOL/L (ref 0–11.9)
APPEARANCE UR: CLEAR
AST SERPL-CCNC: 36 U/L (ref 12–45)
BACTERIA #/AREA URNS HPF: ABNORMAL /HPF
BASOPHILS # BLD AUTO: 0.7 % (ref 0–1.8)
BASOPHILS # BLD: 0.03 K/UL (ref 0–0.12)
BILIRUB SERPL-MCNC: 0.1 MG/DL (ref 0.1–1.5)
BILIRUB UR QL STRIP.AUTO: NEGATIVE
BUN SERPL-MCNC: 11 MG/DL (ref 8–22)
CALCIUM SERPL-MCNC: 9.1 MG/DL (ref 8.4–10.2)
CHLORIDE SERPL-SCNC: 100 MMOL/L (ref 96–112)
CO2 SERPL-SCNC: 28 MMOL/L (ref 20–33)
COLOR UR: YELLOW
CREAT SERPL-MCNC: 0.87 MG/DL (ref 0.5–1.4)
CULTURE IF INDICATED INDCX: YES UA CULTURE
DEPRECATED D DIMER PPP IA-ACNC: <200 NG/ML(D-DU)
EOSINOPHIL # BLD AUTO: 0.16 K/UL (ref 0–0.51)
EOSINOPHIL NFR BLD: 3.5 % (ref 0–6.9)
EPI CELLS #/AREA URNS HPF: ABNORMAL /HPF
ERYTHROCYTE [DISTWIDTH] IN BLOOD BY AUTOMATED COUNT: 38.7 FL (ref 35.9–50)
GFR SERPL CREATININE-BSD FRML MDRD: >60 ML/MIN/1.73 M 2
GLOBULIN SER CALC-MCNC: 2.9 G/DL (ref 1.9–3.5)
GLUCOSE SERPL-MCNC: 275 MG/DL (ref 65–99)
GLUCOSE UR STRIP.AUTO-MCNC: >=1000 MG/DL
HCT VFR BLD AUTO: 34 % (ref 37–47)
HGB BLD-MCNC: 12.3 G/DL (ref 12–16)
IMM GRANULOCYTES # BLD AUTO: 0.01 K/UL (ref 0–0.11)
IMM GRANULOCYTES NFR BLD AUTO: 0.2 % (ref 0–0.9)
KETONES UR STRIP.AUTO-MCNC: NEGATIVE MG/DL
LEUKOCYTE ESTERASE UR QL STRIP.AUTO: NEGATIVE
LYMPHOCYTES # BLD AUTO: 1.11 K/UL (ref 1–4.8)
LYMPHOCYTES NFR BLD: 24.2 % (ref 22–41)
MCH RBC QN AUTO: 30.8 PG (ref 27–33)
MCHC RBC AUTO-ENTMCNC: 36.2 G/DL (ref 33.6–35)
MCV RBC AUTO: 85 FL (ref 81.4–97.8)
MICRO URNS: ABNORMAL
MONOCYTES # BLD AUTO: 0.4 K/UL (ref 0–0.85)
MONOCYTES NFR BLD AUTO: 8.7 % (ref 0–13.4)
NEUTROPHILS # BLD AUTO: 2.88 K/UL (ref 2–7.15)
NEUTROPHILS NFR BLD: 62.7 % (ref 44–72)
NITRITE UR QL STRIP.AUTO: NEGATIVE
NRBC # BLD AUTO: 0 K/UL
NRBC BLD AUTO-RTO: 0 /100 WBC
PH UR STRIP.AUTO: 6 [PH]
PLATELET # BLD AUTO: 161 K/UL (ref 164–446)
PMV BLD AUTO: 11 FL (ref 9–12.9)
POTASSIUM SERPL-SCNC: 3.9 MMOL/L (ref 3.6–5.5)
PROT SERPL-MCNC: 6.2 G/DL (ref 6–8.2)
PROT UR QL STRIP: 100 MG/DL
RBC # BLD AUTO: 4 M/UL (ref 4.2–5.4)
RBC # URNS HPF: ABNORMAL /HPF
RBC UR QL AUTO: ABNORMAL
SODIUM SERPL-SCNC: 136 MMOL/L (ref 135–145)
SP GR UR STRIP.AUTO: 1.01
TROPONIN I SERPL-MCNC: <0.02 NG/ML (ref 0–0.04)
WBC # BLD AUTO: 4.6 K/UL (ref 4.8–10.8)
WBC #/AREA URNS HPF: ABNORMAL /HPF

## 2017-02-24 PROCEDURE — 96374 THER/PROPH/DIAG INJ IV PUSH: CPT

## 2017-02-24 PROCEDURE — 70450 CT HEAD/BRAIN W/O DYE: CPT

## 2017-02-24 PROCEDURE — 99284 EMERGENCY DEPT VISIT MOD MDM: CPT

## 2017-02-24 PROCEDURE — 85379 FIBRIN DEGRADATION QUANT: CPT

## 2017-02-24 PROCEDURE — 71020 DX-CHEST-2 VIEWS: CPT

## 2017-02-24 PROCEDURE — 87086 URINE CULTURE/COLONY COUNT: CPT

## 2017-02-24 PROCEDURE — 700111 HCHG RX REV CODE 636 W/ 250 OVERRIDE (IP): Performed by: EMERGENCY MEDICINE

## 2017-02-24 PROCEDURE — 80053 COMPREHEN METABOLIC PANEL: CPT

## 2017-02-24 PROCEDURE — 85025 COMPLETE CBC W/AUTO DIFF WBC: CPT

## 2017-02-24 PROCEDURE — 96375 TX/PRO/DX INJ NEW DRUG ADDON: CPT

## 2017-02-24 PROCEDURE — 81001 URINALYSIS AUTO W/SCOPE: CPT

## 2017-02-24 PROCEDURE — 93005 ELECTROCARDIOGRAM TRACING: CPT | Performed by: EMERGENCY MEDICINE

## 2017-02-24 PROCEDURE — 36415 COLL VENOUS BLD VENIPUNCTURE: CPT

## 2017-02-24 PROCEDURE — 700105 HCHG RX REV CODE 258: Performed by: EMERGENCY MEDICINE

## 2017-02-24 PROCEDURE — 84484 ASSAY OF TROPONIN QUANT: CPT

## 2017-02-24 RX ORDER — DIPHENHYDRAMINE HYDROCHLORIDE 50 MG/ML
50 INJECTION INTRAMUSCULAR; INTRAVENOUS ONCE
Status: COMPLETED | OUTPATIENT
Start: 2017-02-24 | End: 2017-02-24

## 2017-02-24 RX ORDER — SODIUM CHLORIDE 9 MG/ML
1000 INJECTION, SOLUTION INTRAVENOUS ONCE
Status: COMPLETED | OUTPATIENT
Start: 2017-02-24 | End: 2017-02-24

## 2017-02-24 RX ORDER — KETOROLAC TROMETHAMINE 30 MG/ML
30 INJECTION, SOLUTION INTRAMUSCULAR; INTRAVENOUS ONCE
Status: COMPLETED | OUTPATIENT
Start: 2017-02-24 | End: 2017-02-24

## 2017-02-24 RX ORDER — NITROFURANTOIN 25; 75 MG/1; MG/1
100 CAPSULE ORAL 2 TIMES DAILY
Qty: 14 CAP | Refills: 0 | Status: SHIPPED | OUTPATIENT
Start: 2017-02-24 | End: 2017-03-03

## 2017-02-24 RX ADMIN — SODIUM CHLORIDE 1000 ML: 9 INJECTION, SOLUTION INTRAVENOUS at 19:14

## 2017-02-24 RX ADMIN — DIPHENHYDRAMINE HYDROCHLORIDE 50 MG: 50 INJECTION INTRAMUSCULAR; INTRAVENOUS at 19:14

## 2017-02-24 RX ADMIN — KETOROLAC TROMETHAMINE 30 MG: 30 INJECTION, SOLUTION INTRAMUSCULAR; INTRAVENOUS at 19:14

## 2017-02-24 NOTE — ED AVS SNAPSHOT
2/24/2017          Yuki Riddle  [address]  [facility] [address]    Dear Yuki:    [facility] wants to ensure your discharge home is safe and you or your loved ones have had all your questions answered regarding your care after you leave the hospital.    You may receive a telephone call within two days of your discharge.  This call is to make certain you understand your discharge instructions as well as ensure we provided you with the best care possible during your stay with us.     The call will only last approximately 3-5 minutes and will be done by a nurse.    Once again, we want to ensure your discharge home is safe and that you have a clear understanding of any next steps in your care.  If you have any questions or concerns, please do not hesitate to contact us, we are here for you.  Thank you for choosing [facility] for your healthcare needs.    Sincerely,    Regan Bowers    [facility]

## 2017-02-24 NOTE — ED AVS SNAPSHOT
TRELYS Access Code: [email or web address]  Expires: 3/18/2017  5:04 PM    TRELYS  A secure, online tool to manage your health information     [employer]’s TRELYS® is a secure, online tool that connects you to your personalized health information from the privacy of your home -- day or night - making it very easy for you to manage your healthcare. Once the activation process is completed, you can even access your medical information using the TRELYS bhumika, which is available for free in the Apple Bhumika store or Google Play store.     TRELYS provides the following levels of access (as shown below):   My Chart Features   [facility] Primary Care Doctor [facility]  Specialists [facility]  Urgent  Care Non-[facility]  Primary Care  Doctor   Email your healthcare team securely and privately 24/7 X X X X   Manage appointments: schedule your next appointment; view details of past/upcoming appointments X      Request prescription refills. X      View recent personal medical records, including lab and immunizations X X X X   View health record, including health history, allergies, medications X X X X   Read reports about your outpatient visits, procedures, consult and ER notes X X X X   See your discharge summary, which is a recap of your hospital and/or ER visit that includes your diagnosis, lab results, and care plan. X X       How to register for TRELYS:  1. Go to  [email or web address].  2. Click on the Sign Up Now box, which takes you to the New Member Sign Up page. You will need to provide the following information:  a. Enter your TRELYS Access Code exactly as it appears at the top of this page. (You will not need to use this code after you’ve completed the sign-up process. If you do not sign up before the expiration date, you must request a new code.)   b. Enter your date of birth.   c. Enter your home email address.   d. Click Submit, and follow the next screen’s instructions.  3. Create a TRELYS ID. This will be your TRELYS  login ID and cannot be changed, so think of one that is secure and easy to remember.  4. Create a Movity password. You can change your password at any time.  5. Enter your Password Reset Question and Answer. This can be used at a later time if you forget your password.   6. Enter your e-mail address. This allows you to receive e-mail notifications when new information is available in Movity.  7. Click Sign Up. You can now view your health information.    For assistance activating your Movity account, call [phone]

## 2017-02-24 NOTE — ED AVS SNAPSHOT
Home Care Instructions                                                                                                                Yuki Riddle   MRN: [id number]    Department:  [facility], Emergency Dept   Date of Visit:  2/24/2017            [facility], Emergency Dept    [address]    [place] [address]    Phone:  [phone]      You were seen by     Lance Hoff M.D.      Your Diagnosis Was     Cystitis without hematuria     N30.90       These are the medications you received during your hospitalization from 02/24/2017 1757 to 02/24/2017 2052     Date/Time Order Dose Route Action    02/24/2017 1914 NS infusion 1,000 mL 1,000 mL Intravenous New Bag    02/24/2017 1914 ketorolac (TORADOL) injection 30 mg 30 mg Intravenous Given    02/24/2017 1914 diphenhydrAMINE (BENADRYL) injection 50 mg 50 mg Intravenous Given      Follow-up Information     1. Schedule an appointment as soon as possible for a visit with ASHA Toure.    Specialty:  Family Medicine    Contact information    [address] [place] [address] [address]  [place] [id number] [phone]        Medication Information     Review all of your home medications and newly ordered medications with your primary doctor and/or pharmacist as soon as possible. Follow medication instructions as directed by your doctor and/or pharmacist.     Please keep your complete medication list with you and share with your physician. Update the information when medications are discontinued, doses are changed, or new medications (including over-the-counter products) are added; and carry medication information at all times in the event of emergency situations.               Medication List      START taking these medications        Instructions    nitrofurantoin monohydr macro 100 MG Caps   Commonly known as:  MACROBID    Take 1 Cap by mouth 2 times a day for 7 days.   Dose:  100 mg         ASK your doctor about these  medications        Instructions    buPROPion 100 MG Tabs   Commonly known as:  WELLBUTRIN    Take 100 mg by mouth every day.   Dose:  100 mg       carisoprodol 350 MG Tabs   Commonly known as:  SOMA    Take 350 mg by mouth every 8 hours as needed for Muscle Spasms.   Dose:  350 mg       clonazepam 1 MG Tabs   Commonly known as:  KLONOPIN    Take 1 Tab by mouth every 8 hours as needed (anxiety).   Dose:  1 mg       cyclobenzaprine 10 MG Tabs   Commonly known as:  FLEXERIL    Take 10 mg by mouth 3 times a day as needed.   Dose:  10 mg       gabapentin 300 MG Caps   Commonly known as:  NEURONTIN    Take 600 mg by mouth 3 times a day.   Dose:  600 mg       hydrOXYzine 25 MG Tabs   Commonly known as:  ATARAX    Take 25 mg by mouth every 12 hours.   Dose:  25 mg       insulin lispro 100 UNIT/ML Soln   Commonly known as:  HUMALOG    Inject 2-10 Units as instructed 5 Times a Day. Unable to provide SS   Dose:  2-10 Units       NORCO  MG Tabs   Generic drug:  hydrocodone/acetaminophen    Take 1-2 Tabs by mouth every 6 hours as needed.   Dose:  1-2 Tab               Procedures and tests performed during your visit     CBC WITH DIFFERENTIAL    COMP METABOLIC PANEL    CT-HEAD W/O    D-DIMER    DX-CHEST-2 VIEWS    EKG (ER)    ESTIMATED GFR    INSERT IV    TROPONIN    URINALYSIS,CULTURE IF INDICATED    URINE MICROSCOPIC (W/UA)        Discharge Instructions       Urinary Tract Infection  Urinary tract infections (UTIs) can develop anywhere along your urinary tract. Your urinary tract is your body's drainage system for removing wastes and extra water. Your urinary tract includes two kidneys, two ureters, a bladder, and a urethra. Your kidneys are a pair of bean-shaped organs. Each kidney is about the size of your fist. They are located below your ribs, one on each side of your spine.  CAUSES  Infections are caused by microbes, which are microscopic organisms, including fungi, viruses, and bacteria. These organisms are so small  that they can only be seen through a microscope. Bacteria are the microbes that most commonly cause UTIs.  SYMPTOMS   Symptoms of UTIs may vary by age and gender of the patient and by the location of the infection. Symptoms in young women typically include a frequent and intense urge to urinate and a painful, burning feeling in the bladder or urethra during urination. Older women and men are more likely to be tired, shaky, and weak and have muscle aches and abdominal pain. A fever may mean the infection is in your kidneys. Other symptoms of a kidney infection include pain in your back or sides below the ribs, nausea, and vomiting.  DIAGNOSIS  To diagnose a UTI, your caregiver will ask you about your symptoms. Your caregiver also will ask to provide a urine sample. The urine sample will be tested for bacteria and white blood cells. White blood cells are made by your body to help fight infection.  TREATMENT   Typically, UTIs can be treated with medication. Because most UTIs are caused by a bacterial infection, they usually can be treated with the use of antibiotics. The choice of antibiotic and length of treatment depend on your symptoms and the type of bacteria causing your infection.  HOME CARE INSTRUCTIONS  · If you were prescribed antibiotics, take them exactly as your caregiver instructs you. Finish the medication even if you feel better after you have only taken some of the medication.  · Drink enough water and fluids to keep your urine clear or pale yellow.  · Avoid caffeine, tea, and carbonated beverages. They tend to irritate your bladder.  · Empty your bladder often. Avoid holding urine for long periods of time.  · Empty your bladder before and after sexual intercourse.  · After a bowel movement, women should cleanse from front to back. Use each tissue only once.  SEEK MEDICAL CARE IF:   · You have back pain.  · You develop a fever.  · Your symptoms do not begin to resolve within 3 days.  SEEK IMMEDIATE  MEDICAL CARE IF:   · You have severe back pain or lower abdominal pain.  · You develop chills.  · You have nausea or vomiting.  · You have continued burning or discomfort with urination.  MAKE SURE YOU:   · Understand these instructions.  · Will watch your condition.  · Will get help right away if you are not doing well or get worse.     This information is not intended to replace advice given to you by your health care provider. Make sure you discuss any questions you have with your health care provider.     Document Released: 09/27/2006 Document Revised: 01/08/2016 Document Reviewed: 01/25/2013  [employer] Interactive Patient Education ©2016 [employer]            Patient Information     Patient Information    Following emergency treatment: all patient requiring follow-up care must return either to a private physician or a clinic if your condition worsens before you are able to obtain further medical attention, please return to the emergency room.     Billing Information    At [facility], we work to make the billing process streamlined for our patients.  Our Representatives are here to answer any questions you may have regarding your hospital bill.  If you have insurance coverage and have supplied your insurance information to us, we will submit a claim to your insurer on your behalf.  Should you have any questions regarding your bill, we can be reached online or by phone as follows:  Online: You are able pay your bills online or live chat with our representatives about any billing questions you may have. We are here to help Monday - Friday from 8:00am to 7:30pm and 9:00am - 12:00pm on Saturdays.  Please visit [email or web address]  for more information.   Phone:  [phone] or [phone]    Please note that your emergency physician, surgeon, pathologist, radiologist, anesthesiologist, and other specialists are not employed by [facility] and will therefore bill separately for  their services.  Please contact them directly for any questions concerning their bills at the numbers below:     Emergency Physician Services:  [phone]  [facility]:  [phone]  [facility]:  [phone]  [facility]:  [phone]    1. Your final bill may vary from the amount quoted upon discharge if all procedures are not complete at that time, or if your doctor has additional procedures of which we are not aware. You will receive an additional bill if you return to the Emergency Department at [facility] for suture removal regardless of the facility of which the sutures were placed.     2. Please arrange for settlement of this account at the emergency registration.    3. All self-pay accounts are due in full at the time of treatment.  If you are unable to meet this obligation then payment is expected within 4-5 days.     4. If you have had radiology studies (CT, X-ray, Ultrasound, MRI), you have received a preliminary result during your emergency department visit. Please contact the radiology department [phone] to receive a copy of your final result. Please discuss the Final result with your primary physician or with the follow up physician provided.     Crisis Hotline:  [place] Crisis Hotline:  1-133-UIXTEQZ or 1-874.939.7680  [place] Crisis Hotline:    1-617.356.2805 or 200-380-9398         ED Discharge Follow Up Questions    1. In order to provide you with very good care, we would like to follow up with a phone call in the next few days.  May we have your permission to contact you?     YES /  NO    2. What is the best phone number to call you? (       )_____-__________    3. What is the best time to call you?      Morning  /  Afternoon  /  Evening                   Patient Signature:  ____________________________________________________________    Date:  ____________________________________________________________      Your appointments      Apr 18, 2017 11:20 AM   New Patient with Oliver Tyler M.D.   [facility] Neurology (--)    [address], [address]  [facility] [address] [phone]           Please bring Photo ID, Insurance Cards, All Medication Bottles and copies of any legal documents (such as Living Will, Power of ) If speaking a language besides English please bring an adult . Please arrive 30 minutes prior for check in and registration. You will be receiving a confirmation call a few days before your appointment from our automated call confirmation system.            Apr 28, 2017  5:00 PM   CT BODY WO 30 with [facility] CT 1   [facility] [place]    [address]  [facility] [address] [phone]           Taking medications as regularly scheduled is strongly encouraged.            May 17, 2017  2:40 PM   Established Patient Pul with A Rotation   [facility] Pulmonary Medicine (--)    [address]  [address]  [place] [id number] [phone]            Jun 21, 2017  1:30 PM   New Patient with Yunior Vanegas M.D.   [facility] PHYSIATRY (--)    [address]  [place] [id number] [phone]           Please bring Photo ID, Insurance Cards, All Medication Bottles and copies of any legal documents (such as Living Will, Power of ) If speaking a language besides English please bring an adult . Please arrive 30 minutes prior for check in and registration. You will be receiving a confirmation call a few days before your appointment from our automated call confirmation system.

## 2017-02-25 NOTE — ED NOTES
Chief Complaint   Patient presents with   • Headache     started this afternoon   • Eye Pain     c/o difficulty seeing out of Left eye   • Fever     reports Temp 101 at home     /95 mmHg  Pulse 103  Temp(Src) 37.4 °C (99.4 °F)  Resp 18  SpO2 91%  LMP 01/01/2002    ERP at bedside

## 2017-02-25 NOTE — ED NOTES
Pt sleeping, awakens to voice. Reports HA has resolved, blurred vision has decreased. VS remain stable, side rails raised, call light in reach.

## 2017-02-25 NOTE — ED PROVIDER NOTES
ED Provider Note    CHIEF COMPLAINT  Chief Complaint   Patient presents with   • Headache     started this afternoon   • Eye Pain     c/o difficulty seeing out of Left eye   • Fever     reports Temp 101 at home       HPI  Yuki Riddle is a 51 y.o. female who presents patient states that she woke up from a nap this afternoon at 1:00 with a headache. She describes the headache as located in the occipital region bilaterally, radiating forward. She describes it as moderate in intensity at onset, with progressive worsening as the afternoon went on. The patient also describes having a blurriness of vision in her left eye during this period of time and having a elevated temperature of 101 at home. Also, the patient complains of some generalized body aches, and increased fatigue over the past 2 days. Secondary to these symptoms, the patient decided to present here for further evaluation.    REVIEW OF SYSTEMS   Patient endorses fever, vision change, denies sore throat, endorses chest pain, shortness of breath, denies nausea, dysuria, focal muscle pain, rashes, or neurologic deficits     PAST MEDICAL HISTORY   has a past medical history of Fibromyalgia; Diabetic retinopathy; Neuropathy (CMS-HCC); Migraine; Diabetes; TIA (transient ischemic attack); Urinary incontinence; Hypertension; Heart burn; Indigestion; Urinary bladder disorder; Breath shortness; Cataract; Psychiatric problem; Bowel habit changes; Dental disorder; Asthma; Bronchitis (12/2016); Pneumonia (1993); Cold (12/3/2016); Sleep apnea; Arthritis; and Rheumatoid arthritis (CMS-HCC).    SOCIAL HISTORY  Social History     Social History Main Topics   • Smoking status: Current Every Day Smoker -- 0.50 packs/day for 29 years     Types: Cigarettes   • Smokeless tobacco: Never Used      Comment: 1/2 PPD   • Alcohol Use: No   • Drug Use: No   • Sexual Activity: Not on file       SURGICAL HISTORY   has past surgical history that includes tonsillectomy;  cholecystectomy; primary c section; other; foreign body removal (6/18/2013); gastroscopy with balloon dilatation (2/13/2015); and gastroscopy with biopsy (2/13/2015).    CURRENT MEDICATIONS  Home Medications     **Home medications have not yet been reviewed for this encounter**          ALLERGIES  Allergies   Allergen Reactions   • Compazine      Aggressive behavior    • Sulfa Drugs Itching     Blotchy spots on chest       PHYSICAL EXAM  VITAL SIGNS: /95 mmHg  Pulse 85  Temp(Src) 37.4 °C (99.4 °F)  Resp 12  Wt 74.9 kg (165 lb 2 oz)  SpO2 96%  LMP 01/01/2002   Pulse ox interpretation: I interpret this pulse ox as normal.  Constitutional: Alert in no apparent distress.  HENT: Head normocephalic, atraumatic, Bilateral external ears normal, Nose normal.  Eyes: Pupils are equal, extraocular movements intact, Conjunctiva normal, Non-icteric.   Neck: Normal range of motion, Supple, No stridor.   Lymphatic: No lymphadenopathy noted.   Cardiovascular: Regular rate and rhythm, no rubs murmurs or gallops   Thorax & Lungs: Lungs clear to auscultation bilaterally, No acute respiratory distress, No wheezing, No increased work of breathing.   Abdomen: Soft, nontender, Non-distended   Skin: Warm, Dry, No erythema, No rash.   Back: No bony tenderness, No CVA tenderness.   Extremities: Intact distal pulses, No edema, No tenderness, No cyanosis   Musculoskeletal: Good range of motion in all major joints. No tenderness to palpation or major deformities noted.   Neurologic: Alert , Normal motor function, Normal sensory function, No focal deficits noted.   Psychiatric: Affect normal, Judgment normal, Mood normal.       DIAGNOSTIC STUDIES / PROCEDURES    EKG  Normal sinus rhythm, rate of 98, normal intervals, no ST or T-wave changes consistent with ischemia  General impression, normal EKG    LABS  Labs Reviewed   COMP METABOLIC PANEL - Abnormal; Notable for the following:     Glucose 275 (*)     Alkaline Phosphatase 101 (*)      All other components within normal limits   CBC WITH DIFFERENTIAL - Abnormal; Notable for the following:     WBC 4.6 (*)     RBC 4.00 (*)     Hematocrit 34.0 (*)     MCHC 36.2 (*)     Platelet Count 161 (*)     All other components within normal limits   URINALYSIS,CULTURE IF INDICATED - Abnormal; Notable for the following:     Glucose >=1000 (*)     Protein 100 (*)     Occult Blood Small (*)     All other components within normal limits   URINE MICROSCOPIC (W/UA) - Abnormal; Notable for the following:     WBC 10-20 (*)     RBC 5-10 (*)     Bacteria Few (*)     All other components within normal limits   D-DIMER   TROPONIN   ESTIMATED GFR     RADIOLOGY  CT-HEAD W/O   Final Result      No acute intracranial findings.         DX-CHEST-2 VIEWS   Final Result      No acute findings.          COURSE & MEDICAL DECISION MAKING  Pertinent Labs & Imaging studies reviewed. (See chart for details)    Patient presented here for evaluation of shortness of breath, cough, headache, fever, decreased vision in her left eye, and multiple other complaints. It's difficult to elucidate chief complaint for this patient for this visit however given the acute onset of her headache with vision change, had some concern initially for intracranial hemorrhage. The patient however on CT scan here shows no evidence of acute intracranial injury, and in addition other considerations included pneumonia but the patient has no evidence of pneumonia on chest x-ray. Additionally the patient had some concern for pulmonary embolus however the patient has a normal d-dimer here and no risk factors for PE. The remainder of the patient's laboratory analysis was relatively unremarkable with the exception of pyuria and bacteriuria suggestive of urinary tract infection. The patient was given IV fluids for treatment of dehydration, and will be started on prescription for treatment of acute cystitis with Bactrim. The patient will be given return precautions  including increasing shortness of breath, fevers, or any other new or concerning symptoms. Additionally the patient had resolution of her visual changes after IV fluids, administration of some pain control and IV Benadryl with some sleep and overall rest. Given this it's unclear as to the exact etiology of the patient's visual symptoms however I do think it is likely related to her headache, possibly involving a complex migraine.    The patient will return for worsening symptoms and is stable at the time of discharge. The patient verbalizes understanding.    The patient is referred to a primary physician for blood pressure management, diabetic screening, and for all other preventative health concerns should they be present.     FINAL IMPRESSION  1. Acute cystitis with hematuria  2. Migraine headache  3. Dehydration         Electronically signed by: Lance Hoff, 2/24/2017 8:50 PM    This record was made with a voice recognition software. I have tried to correct any grammar, spelling or context errors to the best of my ability, but errors may still remain. Interpretation of this chart should be taken in this context.

## 2017-02-25 NOTE — DISCHARGE INSTRUCTIONS
Urinary Tract Infection  Urinary tract infections (UTIs) can develop anywhere along your urinary tract. Your urinary tract is your body's drainage system for removing wastes and extra water. Your urinary tract includes two kidneys, two ureters, a bladder, and a urethra. Your kidneys are a pair of bean-shaped organs. Each kidney is about the size of your fist. They are located below your ribs, one on each side of your spine.  CAUSES  Infections are caused by microbes, which are microscopic organisms, including fungi, viruses, and bacteria. These organisms are so small that they can only be seen through a microscope. Bacteria are the microbes that most commonly cause UTIs.  SYMPTOMS   Symptoms of UTIs may vary by age and gender of the patient and by the location of the infection. Symptoms in young women typically include a frequent and intense urge to urinate and a painful, burning feeling in the bladder or urethra during urination. Older women and men are more likely to be tired, shaky, and weak and have muscle aches and abdominal pain. A fever may mean the infection is in your kidneys. Other symptoms of a kidney infection include pain in your back or sides below the ribs, nausea, and vomiting.  DIAGNOSIS  To diagnose a UTI, your caregiver will ask you about your symptoms. Your caregiver also will ask to provide a urine sample. The urine sample will be tested for bacteria and white blood cells. White blood cells are made by your body to help fight infection.  TREATMENT   Typically, UTIs can be treated with medication. Because most UTIs are caused by a bacterial infection, they usually can be treated with the use of antibiotics. The choice of antibiotic and length of treatment depend on your symptoms and the type of bacteria causing your infection.  HOME CARE INSTRUCTIONS  · If you were prescribed antibiotics, take them exactly as your caregiver instructs you. Finish the medication even if you feel better after you  have only taken some of the medication.  · Drink enough water and fluids to keep your urine clear or pale yellow.  · Avoid caffeine, tea, and carbonated beverages. They tend to irritate your bladder.  · Empty your bladder often. Avoid holding urine for long periods of time.  · Empty your bladder before and after sexual intercourse.  · After a bowel movement, women should cleanse from front to back. Use each tissue only once.  SEEK MEDICAL CARE IF:   · You have back pain.  · You develop a fever.  · Your symptoms do not begin to resolve within 3 days.  SEEK IMMEDIATE MEDICAL CARE IF:   · You have severe back pain or lower abdominal pain.  · You develop chills.  · You have nausea or vomiting.  · You have continued burning or discomfort with urination.  MAKE SURE YOU:   · Understand these instructions.  · Will watch your condition.  · Will get help right away if you are not doing well or get worse.     This information is not intended to replace advice given to you by your health care provider. Make sure you discuss any questions you have with your health care provider.     Document Released: 09/27/2006 Document Revised: 01/08/2016 Document Reviewed: 01/25/2013  [employer] Patient Education ©2016 [employer]

## 2017-02-25 NOTE — ED NOTES
Patient verbalized understanding of discharge instructions including order not to drive/drink alcohol for 12 hrs following Benadryl and to prevent  Falls due to lethargy. No questions at this time. VS stable, patient will be wheeled to exit to meet daughter with d/c instructions and rx in hand.

## 2017-02-27 LAB
BACTERIA UR CULT: NORMAL
SIGNIFICANT IND 70042: NORMAL
SITE SITE: NORMAL
SOURCE SOURCE: NORMAL

## 2017-04-04 ENCOUNTER — HOSPITAL ENCOUNTER (OUTPATIENT)
Dept: RADIOLOGY | Facility: MEDICAL CENTER | Age: 52
End: 2017-04-04

## 2017-04-11 LAB — EKG IMPRESSION: NORMAL

## 2017-04-13 ENCOUNTER — HOSPITAL ENCOUNTER (OUTPATIENT)
Dept: RADIOLOGY | Facility: MEDICAL CENTER | Age: 52
End: 2017-04-13
Attending: INTERNAL MEDICINE
Payer: MEDICAID

## 2017-04-13 DIAGNOSIS — N63.0 LUMP OR MASS IN BREAST: ICD-10-CM

## 2017-04-13 DIAGNOSIS — Z12.31 ENCOUNTER FOR MAMMOGRAM TO ESTABLISH BASELINE MAMMOGRAM: ICD-10-CM

## 2017-04-13 PROCEDURE — 76642 ULTRASOUND BREAST LIMITED: CPT | Mod: RT

## 2017-04-13 PROCEDURE — G0204 DX MAMMO INCL CAD BI: HCPCS

## 2017-04-28 ENCOUNTER — HOSPITAL ENCOUNTER (OUTPATIENT)
Dept: RADIOLOGY | Facility: MEDICAL CENTER | Age: 52
End: 2017-04-28
Attending: INTERNAL MEDICINE
Payer: MEDICAID

## 2017-04-28 DIAGNOSIS — R91.8 ABNORMAL CT SCAN, LUNG: ICD-10-CM

## 2017-04-28 PROCEDURE — 71250 CT THORAX DX C-: CPT

## 2017-06-02 ENCOUNTER — OFFICE VISIT (OUTPATIENT)
Dept: PULMONOLOGY | Facility: HOSPICE | Age: 52
End: 2017-06-02
Payer: MEDICAID

## 2017-06-02 VITALS
RESPIRATION RATE: 18 BRPM | OXYGEN SATURATION: 93 % | TEMPERATURE: 98.4 F | SYSTOLIC BLOOD PRESSURE: 140 MMHG | HEIGHT: 65 IN | DIASTOLIC BLOOD PRESSURE: 100 MMHG | WEIGHT: 159 LBS | BODY MASS INDEX: 26.49 KG/M2 | HEART RATE: 102 BPM

## 2017-06-02 DIAGNOSIS — F17.210 SMOKES 1/2 PACK PER DAY: ICD-10-CM

## 2017-06-02 DIAGNOSIS — J40 BRONCHITIS: ICD-10-CM

## 2017-06-02 DIAGNOSIS — F17.200 TOBACCO DEPENDENCE: ICD-10-CM

## 2017-06-02 DIAGNOSIS — R91.8 ABNORMAL CT SCAN, LUNG: ICD-10-CM

## 2017-06-02 DIAGNOSIS — J42 CHRONIC BRONCHITIS, UNSPECIFIED CHRONIC BRONCHITIS TYPE (HCC): ICD-10-CM

## 2017-06-02 PROCEDURE — 99213 OFFICE O/P EST LOW 20 MIN: CPT | Performed by: NURSE PRACTITIONER

## 2017-06-02 RX ORDER — DIAZEPAM 10 MG/1
10 TABLET ORAL 2 TIMES DAILY PRN
COMMUNITY
End: 2018-05-23

## 2017-06-02 RX ORDER — METHYLPREDNISOLONE 4 MG/1
TABLET ORAL
Qty: 21 TAB | Refills: 0 | Status: SHIPPED | OUTPATIENT
Start: 2017-06-02 | End: 2017-11-05

## 2017-06-02 RX ORDER — AZITHROMYCIN 250 MG/1
TABLET, FILM COATED ORAL
Qty: 6 TAB | Refills: 0 | Status: SHIPPED | OUTPATIENT
Start: 2017-06-02 | End: 2017-11-05

## 2017-06-02 NOTE — PROGRESS NOTES
CC:  Here for f/u pulmonary issues as listed below    HPI:   Yuki presents today for follow up for CT results. PFTs from 1/2017 indicate a Fev1 of 2.29L or 79% predicted with a mild bronchodilator response, Fev1/FVC ratio of 78, DLCO 97%. She continues to smoke half a pack a day. She has smoked for the last 34 years. She is on the fence if she is interested in quitting smoking but interested in being referred to the smoking cessation program. Patient does report she has shortness of breath with exertion. She is quite sedentary to dim many ailments including arthritis in that she is not able to participate in exercise. She was encouraged water aerobics. Patient also has a 2 week history of a cough producing brown sputum, chest tightness. She denies wheeze, current fever and chills, nasal congestion, acid reflux.  Patient originally was referred after an abnormal chest x-ray was completed. CAT scan of the chest confirmed one-3-17 showed a 2.2 cm left lower lobe spiculated mass and borderline left infrahilar adenopathy, no other infiltrates and no pleural effusions, 1.2 cm triangular shaped soft tissue anterior superior mediastinum consistent with residual thymic tissue but thymic neoplasm not excluded, mild splenomegaly, and surgical absence of gallbladder and dilatation of the biliary tree similar to previous findings and incompletely imaged.  A PET scan was performed one- showing no evidence of abnormal FDG accumulation, smaller nodule opacity in left lower lobe does not demonstrate uptake, likely resolving infection/inflammation.  Most recent CAT scan from 4/28/2017 showed consolidation with spiculated borders in the left lower pulmonary nodule is no longer identified. Resolution of pneumonia is a possibility. No new pulmonary abnormalities are identified. Borderline prominent thymic tissue again noted without change, mildly enlarged spleen again identified.    Patient Active Problem List    Diagnosis  Date Noted   • DKA, type 1 ([facility] [place]) 05/13/2016     Priority: High   • Intractable nausea and vomiting 02/09/2015     Priority: High   • Volume depletion 02/09/2015     Priority: High   • Hyponatremia 02/09/2015     Priority: High   • Acute exacerbation of COPD with asthma (CMS-HCC) 03/13/2014     Priority: High   • Type 1 diabetes mellitus with hyperglycemia (CMS-HCC) 01/23/2013     Priority: High   • Elevated blood pressure 02/09/2015     Priority: Low   • Tobacco dependence 08/26/2013     Priority: Low   • Fibromyalgia 08/23/2013     Priority: Low   • Bronchitis 06/02/2017   • Smokes 1/2 pack per day 06/02/2017   • Abnormal chest x-ray 01/05/2017   • Abnormal CT scan, lung 01/05/2017   • COPD (chronic obstructive pulmonary disease) (CMS-HCC) 01/05/2017   • Acute narcotic withdrawal (CMS-HCC) 05/13/2016   • Candida esophagitis (CMS-HCC) 02/14/2015   • Peripheral neuropathy 02/09/2015   • Opiate dependence (CMS-HCC) 11/17/2013   • Diabetic foot ulcer (CMS-HCC) 11/16/2013   • Foot fracture, left 01/23/2013       Past Medical History   Diagnosis Date   • Fibromyalgia    • Diabetic retinopathy    • Neuropathy (CMS-HCC)    • Migraine    • Diabetes      juvenile, type I   • TIA (transient ischemic attack)    • Urinary incontinence    • Hypertension    • Heart burn    • Indigestion    • Urinary bladder disorder    • Breath shortness    • Cataract    • Psychiatric problem    • Bowel habit changes      diarrhea   • Dental disorder      upper and lower dentures   • Asthma      no inhalers   • Bronchitis 12/2016   • Pneumonia 1993   • Cold 12/3/2016   • Sleep apnea      cpap ordered, doesn't use   • Arthritis    • Rheumatoid arthritis (CMS-HCC)        Past Surgical History   Procedure Laterality Date   • Tonsillectomy     • Cholecystectomy     • Primary c section     • Other       Skin disorder of unknown name   • Foreign body removal  6/18/2013     Performed by Raz Mari M.D. at SURGERY SAME DAY [facility]   •  Gastroscopy with balloon dilatation  2/13/2015     Performed by Venancio Aburto M.D. at SURGERY [facility]   • Gastroscopy with biopsy  2/13/2015     Performed by Venancio Aburto M.D. at SURGERY [facility]       Family History   Problem Relation Age of Onset   • No Known Problems Sister    • No Known Problems Brother    • No Known Problems Brother    • No Known Problems Daughter    • No Known Problems Daughter        Social History   Substance Use Topics   • Smoking status: Current Every Day Smoker -- 0.50 packs/day for 29 years     Types: Cigarettes   • Smokeless tobacco: Never Used      Comment: 1/2 PPD   • Alcohol Use: No       Current Outpatient Prescriptions   Medication Sig Dispense Refill   • diazepam (VALIUM) 10 MG tablet Take 10 mg by mouth every 6 hours as needed for Anxiety.     • Insulin Glargine (TOUJEO SOLOSTAR) 300 UNIT/ML Solution Pen-injector Inject  as instructed.     • azithromycin (ZITHROMAX) 250 MG Tab Take 2 tablets on day 1, then take 1 tablet a day for 4 days. 6 Tab 0   • MethylPREDNISolone (MEDROL DOSEPAK) 4 MG Tablet Therapy Pack Instructions per package 21 Tab 0   • cyclobenzaprine (FLEXERIL) 10 MG Tab Take 10 mg by mouth 3 times a day as needed.     • hydrocodone/acetaminophen (NORCO)  MG Tab Take 1-2 Tabs by mouth every 6 hours as needed.     • carisoprodol (SOMA) 350 MG Tab Take 350 mg by mouth every 8 hours as needed for Muscle Spasms.     • gabapentin (NEURONTIN) 300 MG Cap Take 600 mg by mouth 3 times a day.     • hydrOXYzine (ATARAX) 25 MG Tab Take 25 mg by mouth every 12 hours.     • insulin lispro (HUMALOG) 100 UNIT/ML SOLN Inject 2-10 Units as instructed 5 Times a Day. Unable to provide SS     • clonazepam (KLONOPIN) 1 MG Tab Take 1 Tab by mouth every 8 hours as needed (anxiety). 15 Tab 0   • buPROPion (WELLBUTRIN) 100 MG Tab Take 100 mg by mouth every day.       No current facility-administered medications for this visit.          Allergies:  "Compazine and Sulfa drugs          ROS   Gen: Denies fever, chills, unintentional weight loss, fatigue, night sweats  E/N/T: Denies nasal congestion, ear pain  Resp:Denies , wheezing, , hemoptysis  CV: Denies chest pain,  palpitations  Sleep:Denies morning headache  Neuro: Denies frequent headaches, weakness, dizziness  GI: Denies acid reflux, N/V  See HPI.  All other systems reviewed and negative          Vital signs for this encounter:  Filed Vitals:    06/02/17 1323   Height: 1.651 m (5' 5\")   Weight: 72.122 kg (159 lb)   Weight % change since last entry.: 0 %   BP: 140/100   Pulse: 102   BMI (Calculated): 26.46   Resp: 18   Temp: 36.9 °C (98.4 °F)   O2 sat % room air: 93 %                 Physical Exam:   Appearance: well developed, well nourished, no acute distress.   Eyes: PERRL, EOM intact, sclere white, conjunctiva moist.  Ears: no lesions or deformities.  Hearing: grossly intact.  Nose: no lesions or deformities.  Dentition: good dentition.   Oropharynx: tongue normal, posterior pharynx without erythema or exudate.  Neck: supple, trachea midline, no masses.  Respiratory effort: no intercostal retractions or use of accessory muscles.  Lung auscultation: Bilateral diminished   Heart auscultation: no murmur, rub, or gallop   Extremities: no cyanosis or edema.  Abdomen: soft, non-tender, no masses.  Gait and station: without difficulty   Digits and Nails: no clubbing, cyanosis, petechiae, or nodes.  Cranial nerves: grossly normal.  Motor: no focal deficits observed.   Skin: no rashes, lesions, or ulcers noted.  Orientation: oriented to time, place, and person.  Mood and affect: mood and affect appropriate, normal interaction with examiner.      Assessment   1. Smokes 1/2 pack per day  REFERRAL TO TOBACCO CESSATION PROGRAM    SPUTUM CULTURE    azithromycin (ZITHROMAX) 250 MG Tab    MethylPREDNISolone (MEDROL DOSEPAK) 4 MG Tablet Therapy Pack   2. Bronchitis     3. Chronic bronchitis, unspecified chronic " bronchitis type (CMS-HCC)     4. Abnormal CT scan, lung     5. Tobacco dependence             PLAN:   Patient Instructions   1) Smoking cessation program referral   2) Vaccines: Up to date with Pneumovax 23  3) Return in about 2 months (around 8/2/2017) for review of symptoms, if not sooner, follow up with KHAI Anderson.   4) Sputum culture   5) Zpack and medrol pack

## 2017-06-02 NOTE — PATIENT INSTRUCTIONS
1) Smoking cessation program referral   2) Vaccines: Up to date with Pneumovax 23  3) Return in about 2 months (around 8/2/2017) for review of symptoms, if not sooner, follow up with KHAI Anderson.   4) Sputum culture   5) Zpack and medrol pack

## 2017-06-02 NOTE — MR AVS SNAPSHOT
"        Yuki Riddle   2017 1:20 PM   Office Visit   MRN: [id number]    Department:  [facility]   Dept Phone:  [phone]    Description:  Female : 1965   Provider:  SEKOU Rose           Reason for Visit     COPD last seen 2/3/17    Results CT       Allergies as of 2017     Allergen Noted Reactions    Compazine 2008       Aggressive behavior     Sulfa Drugs 2008   Itching    Blotchy spots on chest      You were diagnosed with     Smokes 1/2 pack per day   [[id number]]         Vital Signs     Blood Pressure Pulse Temperature Respirations Height Weight    140/100 mmHg 102 36.9 °C (98.4 °F) 18 1.651 m (5' 5\") 72.122 kg (159 lb)    Body Mass Index Oxygen Saturation Last Menstrual Period Smoking Status          26.46 kg/m2 93% 2002 Current Every Day Smoker        Basic Information     Date Of Birth Sex Race Ethnicity Preferred Language    1965 Female White Non- English      Your appointments     2017  1:30 PM   New Patient with Yunior Vanegas M.D.   [facility] (--)    [address]  [facility] [address] [phone]           Please bring Photo ID, Insurance Cards, All Medication Bottles and copies of any legal documents (such as Living Will, Power of ) If speaking a language besides English please bring an adult . Please arrive 30 minutes prior for check in and registration. You will be receiving a confirmation call a few days before your appointment from our automated call confirmation system.            Aug 02, 2017  2:40 PM   New Patient with Saleem Win M.D.   [facility] (--)    [address], [address]  [place] [address] [phone]           Please bring Photo ID, Insurance Cards, All Medication Bottles and copies of any legal documents (such as Living Will, Power of ) If speaking a language besides English please bring an adult . " Please arrive 30 minutes prior for check in and registration. You will be receiving a confirmation call a few days before your appointment from our automated call confirmation system.            Aug 04, 2017  4:00 PM   Established Patient Pul with SEKOU Rose   [facility] (--)    [address]  [address]  [place] [address]   [phone]              Problem List              ICD-10-CM Priority Class Noted - Resolved    Foot fracture, left S92.902A   1/23/2013 - Present    Type 1 diabetes mellitus with hyperglycemia (CMS-HCC) E10.65 High  1/23/2013 - Present    Fibromyalgia M79.7 Low  8/23/2013 - Present    Tobacco dependence F17.200 Low  8/26/2013 - Present    Diabetic foot ulcer (CMS-HCC) E11.621, L97.509   11/16/2013 - Present    Opiate dependence (CMS-HCC) F11.20   11/17/2013 - Present    Acute exacerbation of COPD with asthma (CMS-HCC) J44.1, J45.901 High  3/13/2014 - Present    Intractable nausea and vomiting R11.2 High  2/9/2015 - Present    Volume depletion E86.9 High  2/9/2015 - Present    Hyponatremia E87.1 High  2/9/2015 - Present    Elevated blood pressure [id number] Low  2/9/2015 - Present    Peripheral neuropathy G62.9   2/9/2015 - Present    Candida esophagitis (CMS-HCC) B37.81   2/14/2015 - Present    DKA, type 1 (CMS-HCC) E10.10 High  5/13/2016 - Present    Acute narcotic withdrawal (CMS-HCC) F11.23   5/13/2016 - Present    Abnormal chest x-ray R93.8   1/5/2017 - Present    Abnormal CT scan, lung R91.8   1/5/2017 - Present    COPD (chronic obstructive pulmonary disease) (CMS-HCC) J44.9   1/5/2017 - Present      Health Maintenance        Date Due Completion Dates    IMM HEP B VACCINE (1 of 3 - Primary Series) 1965 ---    DIABETES MONOFILAMENT / LE EXAM 2/15/1967 ---    RETINAL SCREENING 8/15/1984 ---    FASTING LIPID PROFILE 8/15/1984 ---    URINE ACR / MICROALBUMIN 8/15/1984 ---    PAP SMEAR 8/15/1987 ---    A1C SCREENING 2/22/2014 8/22/2013, 1/24/2013,  6/20/2009    COLONOSCOPY 8/15/2016 ---    SERUM CREATININE 2/24/2018 2/24/2017, 1/20/2017, 1/3/2017, 5/24/2016, 5/20/2016, 5/19/2016, 5/18/2016, 5/17/2016, 5/16/2016, 5/15/2016, 5/14/2016, 5/14/2016, 5/13/2016, 5/13/2016, 5/13/2016, 2/1/2016, 1/26/2016, 9/22/2015, 6/29/2015, 2/13/2015, 2/12/2015, 2/12/2015, 2/10/2015, 2/9/2015, 1/16/2015, 8/17/2014, 3/15/2014, 3/14/2014, 3/13/2014, 3/12/2014, 11/17/2013, 11/16/2013, 8/26/2013, 8/25/2013, 8/23/2013, 8/22/2013, 8/19/2013, 6/18/2013, 3/26/2013, 1/24/2013, 1/23/2013, 6/20/2009, 12/13/2008, 5/28/2007    MAMMOGRAM 4/13/2018 4/13/2017    IMM DTaP/Tdap/Td Vaccine (2 - Td) 11/22/2024 11/22/2014            Current Immunizations     Influenza Vaccine Pediatric 11/18/2013  9:58 AM    Pneumococcal polysaccharide vaccine (PPSV-23) 3/15/2014  1:04 PM    Tdap Vaccine 11/22/2014 11:26 PM      Below and/or attached are the medications your provider expects you to take. Review all of your home medications and newly ordered medications with your provider and/or pharmacist. Follow medication instructions as directed by your provider and/or pharmacist. Please keep your medication list with you and share with your provider. Update the information when medications are discontinued, doses are changed, or new medications (including over-the-counter products) are added; and carry medication information at all times in the event of emergency situations     Allergies:  COMPAZINE - (reactions not documented)     SULFA DRUGS - Itching               Medications  Valid as of: June 02, 2017 -  1:57 PM    Generic Name Brand Name Tablet Size Instructions for use    Azithromycin (Tab) ZITHROMAX 250 MG Take 2 tablets on day 1, then take 1 tablet a day for 4 days.        BuPROPion HCl (Tab) WELLBUTRIN 100 MG Take 100 mg by mouth every day.        Carisoprodol (Tab) SOMA 350 MG Take 350 mg by mouth every 8 hours as needed for Muscle Spasms.        ClonazePAM (Tab) KLONOPIN 1 MG Take 1 Tab by mouth every 8  hours as needed (anxiety).        Cyclobenzaprine HCl (Tab) FLEXERIL 10 MG Take 10 mg by mouth 3 times a day as needed.        DiazePAM (Tab) VALIUM 10 MG Take 10 mg by mouth every 6 hours as needed for Anxiety.        Gabapentin (Cap) NEURONTIN 300 MG Take 600 mg by mouth 3 times a day.        Hydrocodone-Acetaminophen (Tab) NORCO  MG Take 1-2 Tabs by mouth every 6 hours as needed.        HydrOXYzine HCl (Tab) ATARAX 25 MG Take 25 mg by mouth every 12 hours.        Insulin Glargine (Solution Pen-injector) Insulin Glargine 300 UNIT/ML Inject  as instructed.        Insulin Lispro (Solution) HUMALOG 100 UNIT/ML Inject 2-10 Units as instructed 5 Times a Day. Unable to provide SS        MethylPREDNISolone (Tablet Therapy Pack) MEDROL DOSEPAK 4 MG Instructions per package        .                 Medicines prescribed today were sent to:     [facility] - [place], [place] - [address]    [address] [facility] [address]    Phone: [phone] Fax: [phone]    Open 24 Hours?: No      Medication refill instructions:       If your prescription bottle indicates you have medication refills left, it is not necessary to call your provider’s office. Please contact your pharmacy and they will refill your medication.    If your prescription bottle indicates you do not have any refills left, you may request refills at any time through one of the following ways: The online [employer] system (except Urgent Care), by calling your provider’s office, or by asking your pharmacy to contact your provider’s office with a refill request. Medication refills are processed only during regular business hours and may not be available until the next business day. Your provider may request additional information or to have a follow-up visit with you prior to refilling your medication.   *Please Note: Medication refills are assigned a new Rx number when refilled electronically. Your pharmacy may indicate that no  refills were authorized even though a new prescription for the same medication is available at the pharmacy. Please request the medicine by name with the pharmacy before contacting your provider for a refill.        Referral     A referral request has been sent to our patient care coordination department. Please allow 3-5 business days for us to process this request and contact you either by phone or mail. If you do not hear from us by the 5th business day, please call us at [phone].        Instructions    1) Smoking cessation program referral   2) Vaccines: Up to date with Pneumovax 23  3) Return in about 2 months (around 8/2/2017) for review of symptoms, if not sooner, follow up with KHAI Anderson.   4) Sputum culture   5) Zpack and medrol pack                 [employer] Access Code: [email or web address]  Expires: 7/2/2017  1:57 PM    [employer]  A secure, online tool to manage your health information     [employer]’s [employer]® is a secure, online tool that connects you to your personalized health information from the privacy of your home -- day or night - making it very easy for you to manage your healthcare. Once the activation process is completed, you can even access your medical information using the [employer] bhumika, which is available for free in the Apple Bhumika store or Google Play store.     [employer] provides the following levels of access (as shown below):   My Chart Features   [facility] Doctor [facility]  Urgent  Care Non-[facility]  Primary Care  Doctor   Email your healthcare team securely and privately 24/7 X X X    Manage appointments: schedule your next appointment; view details of past/upcoming appointments X      Request prescription refills. X      View recent personal medical records, including lab and immunizations X X X X   View health record, including health history, allergies, medications X X X X   Read reports about your outpatient visits, procedures, consult and ER  notes X X X X   See your discharge summary, which is a recap of your hospital and/or ER visit that includes your diagnosis, lab results, and care plan. X X       How to register for [employer]:  1. Go to  [email or web address].  2. Click on the Sign Up Now box, which takes you to the New Member Sign Up page. You will need to provide the following information:  a. Enter your [employer] Access Code exactly as it appears at the top of this page. (You will not need to use this code after you’ve completed the sign-up process. If you do not sign up before the expiration date, you must request a new code.)   b. Enter your date of birth.   c. Enter your home email address.   d. Click Submit, and follow the next screen’s instructions.  3. Create a RoboCent ID. This will be your [employer]Cent login ID and cannot be changed, so think of one that is secure and easy to remember.  4. Create a [employer]Cent password. You can change your password at any time.  5. Enter your Password Reset Question and Answer. This can be used at a later time if you forget your password.   6. Enter your e-mail address. This allows you to receive e-mail notifications when new information is available in [employer].  7. Click Sign Up. You can now view your health information.    For assistance activating your [employer] account, call [phone]        Quit Tobacco Information     Do you want to quit using tobacco?    Quitting tobacco decreases risks of cancer, heart and lung disease, increases life expectancy, improves sense of taste and smell, and increases spending money, among other benefits.    If you are thinking about quitting, we can help.  • [employer] Quit Tobacco Program: [phone]  o Program occurs weekly for four weeks and includes pharmacist consultation on products to support quitting smoking or chewing tobacco. A provider referral is needed for pharmacist consultation.  • Tobacco Users Help Hotline: 3-142-QUIT-NOW (453-8374) or  https://[place].quitlogix.org/  o Free, confidential telephone and online coaching for [place] residents. Sessions are designed on a schedule that is convenient for you. Eligible clients receive free nicotine replacement therapy.  • Nationally: www.smokefree.gov  o Information and professional assistance to support both immediate and long-term needs as you become, and remain, a non-smoker. Smokefree.gov allows you to choose the help that best fits your needs.

## 2017-06-21 ENCOUNTER — OFFICE VISIT (OUTPATIENT)
Dept: PHYSICAL MEDICINE AND REHAB | Facility: MEDICAL CENTER | Age: 52
End: 2017-06-21
Payer: MEDICAID

## 2017-06-21 VITALS
HEART RATE: 104 BPM | WEIGHT: 156 LBS | OXYGEN SATURATION: 97 % | HEIGHT: 65 IN | DIASTOLIC BLOOD PRESSURE: 80 MMHG | SYSTOLIC BLOOD PRESSURE: 126 MMHG | TEMPERATURE: 97.3 F | BODY MASS INDEX: 25.99 KG/M2

## 2017-06-21 DIAGNOSIS — M79.671 PAIN IN BOTH FEET: ICD-10-CM

## 2017-06-21 DIAGNOSIS — E11.42 DIABETIC PERIPHERAL NEUROPATHY (HCC): ICD-10-CM

## 2017-06-21 DIAGNOSIS — M54.9 SPINAL PAIN: ICD-10-CM

## 2017-06-21 DIAGNOSIS — M25.50 ARTHRALGIA, UNSPECIFIED JOINT: ICD-10-CM

## 2017-06-21 DIAGNOSIS — M79.606 PAIN OF LOWER EXTREMITY, UNSPECIFIED LATERALITY: ICD-10-CM

## 2017-06-21 DIAGNOSIS — M79.672 PAIN IN BOTH FEET: ICD-10-CM

## 2017-06-21 DIAGNOSIS — M79.2 NERVE PAIN: ICD-10-CM

## 2017-06-21 DIAGNOSIS — F41.9 ANXIETY AND DEPRESSION: ICD-10-CM

## 2017-06-21 DIAGNOSIS — F32.A ANXIETY AND DEPRESSION: ICD-10-CM

## 2017-06-21 PROCEDURE — 99204 OFFICE O/P NEW MOD 45 MIN: CPT | Performed by: PHYSICAL MEDICINE & REHABILITATION

## 2017-06-21 ASSESSMENT — ENCOUNTER SYMPTOMS
BACK PAIN: 0
NERVOUS/ANXIOUS: 1
VOMITING: 0
PHOTOPHOBIA: 0
ORTHOPNEA: 0
CHILLS: 0
SPUTUM PRODUCTION: 0
FEVER: 0
SENSORY CHANGE: 1
HEMOPTYSIS: 0
PALPITATIONS: 0
ABDOMINAL PAIN: 0
NECK PAIN: 0
TINGLING: 1

## 2017-06-21 NOTE — PROGRESS NOTES
Subjective:      Yuki Riddle is a 51 y.o. female who presents with New Patient      Chief complaint: Lower limb pain        HPI   The patient notes long history of nerve pain, diabetes, spinal/joints/musculoskeletal pain. The patient has had diabetes for greater than 25 years, followed by endocrinology.    Regarding today's visit:    The patient notes ongoing pain in the lower limbs, primarily in the feet and legs, neuropathic component.    The patient notes low back pain, lower limb nerve pain, has had prior lumbar injury.    The patient notes hip area pain, has had prior pelvic fracture.    The patient notes joint/musculoskeletal pain.    The patient notes some neck pain, relatively controlled, without radicular component.    The patient notes intermittent hand paresthesias, without overt carpal tunnel symptoms.    The patient notes anxiety/depression, has been using benzodiazepines.    The patient has had prior treatment with medications. She has tried therapy. No bowel/bladder dysfunction noted. No overt limb weakness noted. She is making an effort with home exercise program as tolerated. The ongoing pain limits her ability to function.      MEDICAL RECORDS REVIEW/DATA REVIEW: Reviewed in epic.    Records Reviewed: Primary care provider notes.     I reviewed medications. Notes has been on chronic pain medication for 15-20 years, per endocrinology. No aberrant behavior noted.     I reviewed  profile 6/21/2017.     I reviewed diagnostic studies:     I reviewed radiographs.     I reviewed lab studies. Reviewed labs 2/2017, including CMP, CBC.      I reviewed medical issues. Followed by primary care provider, consultants    I reviewed family history: No neuromuscular disorders noted.    I reviewed social issues.      PAST MEDICAL HISTORY:   Past Medical History   Diagnosis Date   • Fibromyalgia    • Diabetic retinopathy    • Neuropathy (CMS-HCC)    • Migraine    • Diabetes      juvenile, type I   •  TIA (transient ischemic attack)    • Urinary incontinence    • Hypertension    • Heart burn    • Indigestion    • Urinary bladder disorder    • Breath shortness    • Cataract    • Psychiatric problem    • Bowel habit changes      diarrhea   • Dental disorder      upper and lower dentures   • Asthma      no inhalers   • Bronchitis 12/2016   • Pneumonia 1993   • Cold 12/3/2016   • Sleep apnea      cpap ordered, doesn't use   • Arthritis    • Rheumatoid arthritis (CMS-[facility])        PAST SURGICAL HISTORY:    Past Surgical History   Procedure Laterality Date   • Tonsillectomy     • Cholecystectomy     • Primary c section     • Other       Skin disorder of unknown name   • Foreign body removal  6/18/2013     Performed by Raz Mari M.D. at SURGERY SAME DAY [facility]   • Gastroscopy with balloon dilatation  2/13/2015     Performed by Venancio Aburto M.D. at SURGERY [facility]   • Gastroscopy with biopsy  2/13/2015     Performed by Venancio Aburto M.D. at SURGERY [facility]       ALLERGIES:  Compazine and Sulfa drugs    MEDICATIONS:    Outpatient Encounter Prescriptions as of 6/21/2017   Medication Sig Dispense Refill   • diazepam (VALIUM) 10 MG tablet Take 10 mg by mouth every 6 hours as needed for Anxiety.     • Insulin Glargine (TOUJEO SOLOSTAR) 300 UNIT/ML Solution Pen-injector Inject  as instructed.     • azithromycin (ZITHROMAX) 250 MG Tab Take 2 tablets on day 1, then take 1 tablet a day for 4 days. 6 Tab 0   • MethylPREDNISolone (MEDROL DOSEPAK) 4 MG Tablet Therapy Pack Instructions per package 21 Tab 0   • cyclobenzaprine (FLEXERIL) 10 MG Tab Take 10 mg by mouth 3 times a day as needed.     • hydrocodone/acetaminophen (NORCO)  MG Tab Take 1-2 Tabs by mouth every 6 hours as needed.     • [DISCONTINUED] clonazepam (KLONOPIN) 1 MG Tab Take 1 Tab by mouth every 8 hours as needed (anxiety). 15 Tab 0   • carisoprodol (SOMA) 350 MG Tab Take 350 mg by mouth every 8 hours as needed for  "Muscle Spasms.     • gabapentin (NEURONTIN) 300 MG Cap Take 600 mg by mouth 3 times a day.     • hydrOXYzine (ATARAX) 25 MG Tab Take 25 mg by mouth every 12 hours.     • [DISCONTINUED] buPROPion (WELLBUTRIN) 100 MG Tab Take 100 mg by mouth every day.     • insulin lispro (HUMALOG) 100 UNIT/ML SOLN Inject 2-10 Units as instructed 5 Times a Day. Unable to provide SS       No facility-administered encounter medications on file as of 6/21/2017.       SOCIAL HISTORY:    Social History     Social History   • Marital Status:      Spouse Name: N/A   • Number of Children: N/A   • Years of Education: N/A     Social History Main Topics   • Smoking status: Current Every Day Smoker -- 0.50 packs/day for 29 years     Types: Cigarettes   • Smokeless tobacco: Never Used      Comment: 1/2 PPD   • Alcohol Use: No   • Drug Use: No   • Sexual Activity: Not Asked     Other Topics Concern   • None     Social History Narrative     The patient denies substance use/abuse.      Review of Systems   Constitutional: Negative for fever and chills.   HENT: Negative for hearing loss and tinnitus.    Eyes: Negative for photophobia.   Respiratory: Negative for hemoptysis and sputum production.    Cardiovascular: Negative for palpitations and orthopnea.   Gastrointestinal: Negative for vomiting and abdominal pain.   Genitourinary: Negative for urgency and frequency.   Musculoskeletal: Negative for back pain and neck pain.   Skin: Negative.    Neurological: Positive for tingling and sensory change.   Endo/Heme/Allergies: Negative.    Psychiatric/Behavioral: The patient is nervous/anxious.    All other systems reviewed and are negative.        Objective:     /80 mmHg  Pulse 104  Temp(Src) 36.3 °C (97.3 °F)  Ht 1.651 m (5' 5\")  Wt 70.761 kg (156 lb)  BMI 25.96 kg/m2  SpO2 97%  LMP 01/01/2002     Physical Exam  Constitutional: oriented to person, place, and time, appears well-developed and well-nourished.   HEENT: Normocephalic " atraumatic, neck supple, no JVD noted, no masses noted, no meningeal signs noted  Lymphadenopathy: no cervical, supraclavicular, or inguinal lymphadenopathy noted  Cardiovascular: Intact distal pulses, including at wrists and ankles, no limb swelling noted  Pulmonary: No tachypnea noted, no accessory muscle use noted, no dyspnea noted  Abdominal: Soft, nontender, exhibits no distension, no peritoneal signs, no HSM  Musculoskeletal:   Right shoulder: exhibits mild tenderness. Mild pain with range of motion testing  Left shoulder: exhibits mild tenderness. Mild pain with range of motion testing  Right hip: exhibits mild tenderness. Mild pain with range of motion testing  Left hip: exhibits mild tenderness. Mild pain with range of motion testing  Cervical back: exhibits mild decreased range of motion, mild tenderness and mild pain. Spurling's testing produces mild axial pain, trigger points noted  Lumbar back: exhibits mild decreased range of motion, mild tenderness and mild pain. negative straight leg testing, trigger points noted, tender bilateral sacroiliac joint regions  Wrist/hand: mild pain with range of motion testing, equivocal tinel's at wrist, negative tinel's at elbows  Neurological: oriented to person, place, and time. Cranial nerves grossly intact, normal strength. Sensation intact distally. Reflexes 1+ in upper and lower limbs, Gait mildly antalgic, reciprocal, No upper motor neuron signs evident  Skin: Skin is intact. no rashes or lesions noted  Psychiatric: normal mood and affect. speech is normal and behavior is normal. Judgment and thought content normal. Cognition and memory are normal.        Assessment/Plan:       ASSESSMENT:    1. Nerve pain, diabetic peripheral neuropathy, concern for arterial disease, endocrinology following    - ARTERIAL EVALUATION LOWER EXTREMITY (Regional Only); Future  - I would be interested in reviewing most recent A1c    2. Bilateral foot/ankle pain, sprain strain,  calcaneal spur, history of left foot fracture    3. Low back pain, myofascial pain, lower limb nerve pain, history of right L2 and 3 transverse process fractures, degenerative disc disease    4. Hip area pain, sprain strain, history of pelvic fracture, sacroiliac joint arthritis    5. Joint/musculoskeletal pain, history of fibromyalgia    6. Anxiety/depression    - Review psychosocial interventions    7. Comorbid medical issues, with care per primary care provider, consultants      DISCUSSION/PLAN:    - I discussed management options. I reviewed symptomatic care    - I would like to obtain/review additional records, including most recent laboratory studies, including A1C, [facility]    - I reviewed home exercise program, with medical precautions    - The patient can consider complementary trials with acupuncture, superficial massage therapy, or TENS unit    - I reviewed medication monitoring. Note pain medication per endocrinology.    - The patient should taper off of valium/benzodiazepines due to the risk of interaction with pain medication  - The patient should submit to periodic/random urine drug screens and have sign controlled substance agreement with prescribing provider  - Recommend gradually taper down on pain medication from current 180 morphine equivalent dosing to at least max 90 morphine equivalent dosing, which is maximum under current federal guidelines, recommend tapering at 10%  Increments per month    -Review/consider trials with Cymbalta and Lidoderm patch for the neuropathic component of pain, if not tried previously and no contraindication    - I reviewed further symptomatic medications. I did not prescribe any medications today    - I reviewed additional diagnostic options, including further/advanced imaging, electrodiagnostic testing, and further lab screen, including neuromuscular/rheumatologic screen    - I reviewed additional therapeutic options, including injections/interventional therapy and  additional consultative input     - I encourage the patient to stop or decrease cigarette use    - Return in 8-10 weeks or an as-needed basis      Please note that this dictation was created using voice recognition software. I have made every reasonable attempt to correct obvious errors but there may be errors of grammar and content that I may have overlooked prior to finalization of this note.

## 2017-06-21 NOTE — MR AVS SNAPSHOT
"        Yuki Riddle   2017 1:30 PM   Office Visit   MRN: [id number]    Department:  [facility]   Dept Phone:  [phone]    Description:  Female : 1965   Provider:  Yunior Vanegas M.D.           Reason for Visit     New Patient           Allergies as of 2017     Allergen Noted Reactions    Compazine 2008       Aggressive behavior     Sulfa Drugs 2008   Itching    Blotchy spots on chest      You were diagnosed with     Pain of lower extremity, unspecified laterality   [7025249]       Pain in both feet   [634979]       Nerve pain   [736755]       Diabetic peripheral neuropathy (CMS-HCC)   [829028]         Vital Signs     Blood Pressure Pulse Temperature Height Weight Body Mass Index    126/80 mmHg 104 36.3 °C (97.3 °F) 1.651 m (5' 5\") 70.761 kg (156 lb) 25.96 kg/m2    Oxygen Saturation Last Menstrual Period Smoking Status             97% 2002 Current Every Day Smoker         Basic Information     Date Of Birth Sex Race Ethnicity Preferred Language    1965 Female White Non- English      Your appointments     Aug 02, 2017  2:40 PM   New Patient with Saleem Win M.D.   [facility] (--)    [address] [facility], [address]  [place] [address] [phone]           Please bring Photo ID, Insurance Cards, All Medication Bottles and copies of any legal documents (such as Living Will, Power of ) If speaking a language besides English please bring an adult . Please arrive 30 minutes prior for check in and registration. You will be receiving a confirmation call a few days before your appointment from our automated call confirmation system.            Aug 04, 2017  4:00 PM   Established Patient Pul with SEKOU Rose   [facility] (--)    [address]  [address]  [place] [place] [id number] [phone]            Aug 16, 2017  4:00 PM   Follow Up Visit with Yunior Vnaegas M.D.   [facility] (--)    [address]  [place] [id number] [phone]           You will be receiving a confirmation call a few days before your appointment from our automated call confirmation system.              Problem List              ICD-10-CM Priority Class Noted - Resolved    Foot fracture, left S92.902A   1/23/2013 - Present    Type 1 diabetes mellitus with hyperglycemia (CMS-HCC) E10.65 High  1/23/2013 - Present    Fibromyalgia M79.7 Low  8/23/2013 - Present    Tobacco dependence F17.200 Low  8/26/2013 - Present    Diabetic foot ulcer (CMS-HCC) E11.621, L97.509   11/16/2013 - Present    Opiate dependence (CMS-HCC) F11.20   11/17/2013 - Present    Acute exacerbation of COPD with asthma (CMS-HCC) J44.1, J45.901 High  3/13/2014 - Present    Intractable nausea and vomiting R11.2 High  2/9/2015 - Present    Volume depletion E86.9 High  2/9/2015 - Present    Hyponatremia E87.1 High  2/9/2015 - Present    Elevated blood pressure BIA6542 Low  2/9/2015 - Present    Peripheral neuropathy G62.9   2/9/2015 - Present    Candida esophagitis (CMS-HCC) B37.81   2/14/2015 - Present    DKA, type 1 (CMS-HCC) E10.10 High  5/13/2016 - Present    Acute narcotic withdrawal (CMS-HCC) F11.23   5/13/2016 - Present    Abnormal chest x-ray R93.8   1/5/2017 - Present    Abnormal CT scan, lung R91.8   1/5/2017 - Present    COPD (chronic obstructive pulmonary disease) (CMS-HCC) J44.9   1/5/2017 - Present    Bronchitis J40   6/2/2017 - Present    Smokes 1/2 pack per day F17.210   6/2/2017 - Present      Health Maintenance        Date Due Completion Dates    IMM HEP B VACCINE (1 of 3 - Primary Series) 1965 ---    DIABETES MONOFILAMENT / LE EXAM 2/15/1967 ---    RETINAL SCREENING 8/15/1984 ---    FASTING LIPID PROFILE 8/15/1984 ---    URINE ACR / MICROALBUMIN 8/15/1984 ---    PAP SMEAR 8/15/1987 ---    A1C SCREENING 2/22/2014 8/22/2013, 1/24/2013, 6/20/2009    COLONOSCOPY 8/15/2016 ---    SERUM CREATININE 2/24/2018  2/24/2017, 1/20/2017, 1/3/2017, 5/24/2016, 5/20/2016, 5/19/2016, 5/18/2016, 5/17/2016, 5/16/2016, 5/15/2016, 5/14/2016, 5/14/2016, 5/13/2016, 5/13/2016, 5/13/2016, 2/1/2016, 1/26/2016, 9/22/2015, 6/29/2015, 2/13/2015, 2/12/2015, 2/12/2015, 2/10/2015, 2/9/2015, 1/16/2015, 8/17/2014, 3/15/2014, 3/14/2014, 3/13/2014, 3/12/2014, 11/17/2013, 11/16/2013, 8/26/2013, 8/25/2013, 8/23/2013, 8/22/2013, 8/19/2013, 6/18/2013, 3/26/2013, 1/24/2013, 1/23/2013, 6/20/2009, 12/13/2008, 5/28/2007    MAMMOGRAM 4/13/2018 4/13/2017    IMM DTaP/Tdap/Td Vaccine (2 - Td) 11/22/2024 11/22/2014            Current Immunizations     Influenza Vaccine Pediatric 11/18/2013  9:58 AM    Pneumococcal polysaccharide vaccine (PPSV-23) 3/15/2014  1:04 PM    Tdap Vaccine 11/22/2014 11:26 PM      Below and/or attached are the medications your provider expects you to take. Review all of your home medications and newly ordered medications with your provider and/or pharmacist. Follow medication instructions as directed by your provider and/or pharmacist. Please keep your medication list with you and share with your provider. Update the information when medications are discontinued, doses are changed, or new medications (including over-the-counter products) are added; and carry medication information at all times in the event of emergency situations     Allergies:  COMPAZINE - (reactions not documented)     SULFA DRUGS - Itching               Medications  Valid as of: June 21, 2017 -  2:21 PM    Generic Name Brand Name Tablet Size Instructions for use    Azithromycin (Tab) ZITHROMAX 250 MG Take 2 tablets on day 1, then take 1 tablet a day for 4 days.        Carisoprodol (Tab) SOMA 350 MG Take 350 mg by mouth every 8 hours as needed for Muscle Spasms.        Cyclobenzaprine HCl (Tab) FLEXERIL 10 MG Take 10 mg by mouth 3 times a day as needed.        DiazePAM (Tab) VALIUM 10 MG Take 10 mg by mouth every 6 hours as needed for Anxiety.        Gabapentin (Cap)  NEURONTIN 300 MG Take 600 mg by mouth 3 times a day.        Hydrocodone-Acetaminophen (Tab) NORCO  MG Take 1-2 Tabs by mouth every 6 hours as needed.        HydrOXYzine HCl (Tab) ATARAX 25 MG Take 25 mg by mouth every 12 hours.        Insulin Glargine (Solution Pen-injector) Insulin Glargine 300 UNIT/ML Inject  as instructed.        Insulin Lispro (Solution) HUMALOG 100 UNIT/ML Inject 2-10 Units as instructed 5 Times a Day. Unable to provide SS        MethylPREDNISolone (Tablet Therapy Pack) MEDROL DOSEPAK 4 MG Instructions per package        .                 Medicines prescribed today were sent to:     [facility] - [place], [place] - [address]    [address] [place] [address]    Phone: [phone] Fax: [phone]    Open 24 Hours?: No      Medication refill instructions:       If your prescription bottle indicates you have medication refills left, it is not necessary to call your provider’s office. Please contact your pharmacy and they will refill your medication.    If your prescription bottle indicates you do not have any refills left, you may request refills at any time through one of the following ways: The online [employer] system (except Urgent Care), by calling your provider’s office, or by asking your pharmacy to contact your provider’s office with a refill request. Medication refills are processed only during regular business hours and may not be available until the next business day. Your provider may request additional information or to have a follow-up visit with you prior to refilling your medication.   *Please Note: Medication refills are assigned a new Rx number when refilled electronically. Your pharmacy may indicate that no refills were authorized even though a new prescription for the same medication is available at the pharmacy. Please request the medicine by name with the pharmacy before contacting your provider for a refill.        Your To Do List      Future Labs/Procedures Complete By Expires    ARTERIAL EVALUATION LOWER EXTREMITY (Regional Only)  As directed 12/21/2017    Scheduling Instructions:    Dx: bilateral lower limb pain, diabetes, neuropathy, consider arterial disease    Rx: bilateral lower limb arterial studies         AboutOurWork Access Code: [email or web address]  Expires: 7/2/2017  1:57 PM    AboutOurWork  A secure, online tool to manage your health information     [employer]’s AboutOurWork® is a secure, online tool that connects you to your personalized health information from the privacy of your home -- day or night - making it very easy for you to manage your healthcare. Once the activation process is completed, you can even access your medical information using the AboutOurWork bhumika, which is available for free in the Apple Bhumika store or Google Play store.     AboutOurWork provides the following levels of access (as shown below):   My Chart Features   [facility] Doctor [facility]  Urgent  Care Non-[facility] Care  Doctor   Email your healthcare team securely and privately 24/7 X X X    Manage appointments: schedule your next appointment; view details of past/upcoming appointments X      Request prescription refills. X      View recent personal medical records, including lab and immunizations X X X X   View health record, including health history, allergies, medications X X X X   Read reports about your outpatient visits, procedures, consult and ER notes X X X X   See your discharge summary, which is a recap of your hospital and/or ER visit that includes your diagnosis, lab results, and care plan. X X       How to register for AboutOurWork:  1. Go to  [email or web address].  2. Click on the Sign Up Now box, which takes you to the New Member Sign Up page. You will need to provide the following information:  a. Enter your AboutOurWork Access Code exactly as it appears at the top of this page. (You will not need to use this code after you’ve  completed the sign-up process. If you do not sign up before the expiration date, you must request a new code.)   b. Enter your date of birth.   c. Enter your home email address.   d. Click Submit, and follow the next screen’s instructions.  3. Create a Manifest Digitalt ID. This will be your Clinithink login ID and cannot be changed, so think of one that is secure and easy to remember.  4. Create a [employer]t password. You can change your password at any time.  5. Enter your Password Reset Question and Answer. This can be used at a later time if you forget your password.   6. Enter your e-mail address. This allows you to receive e-mail notifications when new information is available in Clinithink.  7. Click Sign Up. You can now view your health information.    For assistance activating your Clinithink account, call [phone]        Quit Tobacco Information     Do you want to quit using tobacco?    Quitting tobacco decreases risks of cancer, heart and lung disease, increases life expectancy, improves sense of taste and smell, and increases spending money, among other benefits.    If you are thinking about quitting, we can help.  • [facility] Quit Tobacco Program: [phone]  o Program occurs weekly for four weeks and includes pharmacist consultation on products to support quitting smoking or chewing tobacco. A provider referral is needed for pharmacist consultation.  • Tobacco Users Help Hotline: 4-800QUIT-NOW (106-3127) or https://[place].quitlogix.org/  o Free, confidential telephone and online coaching for [place] residents. Sessions are designed on a schedule that is convenient for you. Eligible clients receive free nicotine replacement therapy.  • Nationally: www.smokefree.gov  o Information and professional assistance to support both immediate and long-term needs as you become, and remain, a non-smoker. Smokefree.gov allows you to choose the help that best fits your needs.

## 2017-07-25 ENCOUNTER — APPOINTMENT (OUTPATIENT)
Dept: RADIOLOGY | Facility: MEDICAL CENTER | Age: 52
End: 2017-07-25
Attending: PHYSICAL MEDICINE & REHABILITATION
Payer: MEDICAID

## 2017-08-02 ENCOUNTER — HOSPITAL ENCOUNTER (OUTPATIENT)
Dept: LAB | Facility: MEDICAL CENTER | Age: 52
End: 2017-08-02
Attending: PSYCHIATRY & NEUROLOGY
Payer: MEDICAID

## 2017-08-02 ENCOUNTER — OFFICE VISIT (OUTPATIENT)
Dept: NEUROLOGY | Facility: MEDICAL CENTER | Age: 52
End: 2017-08-02
Payer: MEDICAID

## 2017-08-02 VITALS
OXYGEN SATURATION: 98 % | RESPIRATION RATE: 16 BRPM | SYSTOLIC BLOOD PRESSURE: 150 MMHG | HEART RATE: 95 BPM | TEMPERATURE: 97.9 F | DIASTOLIC BLOOD PRESSURE: 68 MMHG | BODY MASS INDEX: 26.62 KG/M2 | HEIGHT: 65 IN | WEIGHT: 159.8 LBS

## 2017-08-02 DIAGNOSIS — E11.40 PAINFUL DIABETIC NEUROPATHY (HCC): ICD-10-CM

## 2017-08-02 DIAGNOSIS — G62.9 NEUROPATHY: ICD-10-CM

## 2017-08-02 LAB
25(OH)D3 SERPL-MCNC: 19 NG/ML (ref 30–100)
CRP SERPL HS-MCNC: 0.04 MG/DL (ref 0–0.75)
IRON SATN MFR SERPL: 23 % (ref 15–55)
IRON SERPL-MCNC: 78 UG/DL (ref 40–170)
RHEUMATOID FACT SER IA-ACNC: <10 IU/ML (ref 0–14)
THYROPEROXIDASE AB SERPL-ACNC: 0.7 IU/ML (ref 0–9)
TIBC SERPL-MCNC: 336 UG/DL (ref 250–450)
VIT B12 SERPL-MCNC: 499 PG/ML (ref 211–911)

## 2017-08-02 PROCEDURE — 86235 NUCLEAR ANTIGEN ANTIBODY: CPT | Mod: 91

## 2017-08-02 PROCEDURE — 86147 CARDIOLIPIN ANTIBODY EA IG: CPT | Mod: 91

## 2017-08-02 PROCEDURE — 86431 RHEUMATOID FACTOR QUANT: CPT

## 2017-08-02 PROCEDURE — 82306 VITAMIN D 25 HYDROXY: CPT

## 2017-08-02 PROCEDURE — 82607 VITAMIN B-12: CPT

## 2017-08-02 PROCEDURE — 86225 DNA ANTIBODY NATIVE: CPT

## 2017-08-02 PROCEDURE — 84207 ASSAY OF VITAMIN B-6: CPT

## 2017-08-02 PROCEDURE — 99204 OFFICE O/P NEW MOD 45 MIN: CPT | Performed by: PSYCHIATRY & NEUROLOGY

## 2017-08-02 PROCEDURE — 36415 COLL VENOUS BLD VENIPUNCTURE: CPT

## 2017-08-02 PROCEDURE — 86140 C-REACTIVE PROTEIN: CPT

## 2017-08-02 PROCEDURE — 83550 IRON BINDING TEST: CPT

## 2017-08-02 PROCEDURE — 83540 ASSAY OF IRON: CPT

## 2017-08-02 PROCEDURE — 86038 ANTINUCLEAR ANTIBODIES: CPT

## 2017-08-02 PROCEDURE — 86200 CCP ANTIBODY: CPT

## 2017-08-02 PROCEDURE — 83516 IMMUNOASSAY NONANTIBODY: CPT

## 2017-08-02 PROCEDURE — 86800 THYROGLOBULIN ANTIBODY: CPT

## 2017-08-02 PROCEDURE — 86376 MICROSOMAL ANTIBODY EACH: CPT

## 2017-08-02 ASSESSMENT — PATIENT HEALTH QUESTIONNAIRE - PHQ9
CLINICAL INTERPRETATION OF PHQ2 SCORE: 3
5. POOR APPETITE OR OVEREATING: 0 - NOT AT ALL

## 2017-08-02 NOTE — PATIENT INSTRUCTIONS
Dorsal Pedis -- pulses are fine still    IMPRESSION:    1. Type I DM neuropathy- painful--- misable pain and tingling  2. Hx of arthritis, asthma, smoking, autoimmune disease      PLAN/RECOMMENDATIONS:    We are going to focus on the neuropathy--- agree with the patient that --- DM neuropathy  I am not convinced that insulin made her neuropathy worse ( the patient stated that the pain/tingling worsened with lower blood sugar could be transient)    Advise  1. Better control of blood sugar-- like less than 150--- despite the normal sugar may make the pain worse temporarily  2. Quit smoking    We will do blood tests to look for causes of  Reversible causes of neuropathy  It is reasonable to try the following too ( after blood tests)  ________________________________________________________________________    Fish Oil -- Omega 3 1000mg 3# daily  Try Daily Probiotic too  Vit D-3 4000 unit daily  ________________________________________________________________________      [facility]                SIGNATURE:  Saleem Win

## 2017-08-02 NOTE — PROGRESS NOTES
NEUROLOGY NOTE    Referring Physician  Dr Rice-- endocrinologist for DM      CHIEF COMPLAINT:  Was referred to us for neuropathy  Chief Complaint   Patient presents with   • New Patient     Neuropathy       PRESENT ILLNESS:   Was referred to us for neuropathy    She has DM type I since age of 25 YO-- she started having neuropathy since 30YO    She also had rheumatoid arthritis, HTN, heart Burn, asthma, bonchitis, smoking    The patient believed that if she controlled blood sugar less than 180, her neuropathy symptoms worsened-- therefore, she did not want her blood sugar less than 180    Her hands became more tingling with normal blood sugar    PAST MEDICAL HISTORY:  Past Medical History   Diagnosis Date   • Fibromyalgia    • Diabetic retinopathy    • Neuropathy (CMS-HCC)    • Migraine    • Diabetes      juvenile, type I   • TIA (transient ischemic attack)    • Urinary incontinence    • Hypertension    • Heart burn    • Indigestion    • Urinary bladder disorder    • Breath shortness    • Cataract    • Psychiatric problem    • Bowel habit changes      diarrhea   • Dental disorder      upper and lower dentures   • Asthma      no inhalers   • Bronchitis 12/2016   • Pneumonia 1993   • Cold 12/3/2016   • Sleep apnea      cpap ordered, doesn't use   • Arthritis    • Rheumatoid arthritis (CMS-HCC)        PAST SURGICAL HISTORY:  Past Surgical History   Procedure Laterality Date   • Tonsillectomy     • Cholecystectomy     • Primary c section     • Other       Skin disorder of unknown name   • Foreign body removal  6/18/2013     Performed by Raz Mari M.D. at SURGERY SAME DAY [facility]   • Gastroscopy with balloon dilatation  2/13/2015     Performed by Venancio Aburto M.D. at [facility]   • Gastroscopy with biopsy  2/13/2015     Performed by Venancio Aburto M.D. at [facility]       FAMILY HISTORY:  Family History   Problem Relation Age of Onset   • No Known Problems Sister     • No Known Problems Brother    • No Known Problems Brother    • No Known Problems Daughter    • No Known Problems Daughter        SOCIAL HISTORY:  Social History     Social History   • Marital Status:      Spouse Name: N/A   • Number of Children: N/A   • Years of Education: N/A     Occupational History   • Not on file.     Social History Main Topics   • Smoking status: Current Every Day Smoker -- 0.50 packs/day for 29 years     Types: Cigarettes   • Smokeless tobacco: Never Used      Comment: 1/2 PPD   • Alcohol Use: No   • Drug Use: No   • Sexual Activity: Not on file     Other Topics Concern   • Not on file     Social History Narrative     ALLERGIES:  Allergies   Allergen Reactions   • Compazine      Aggressive behavior    • Sulfa Drugs Itching     Blotchy spots on chest     TOBHX  History   Smoking status   • Current Every Day Smoker -- 0.50 packs/day for 29 years   • Types: Cigarettes   Smokeless tobacco   • Never Used     Comment: 1/2 PPD     ALCHX  History   Alcohol Use No     DRUGHX  History   Drug Use No           MEDICATIONS:  Current Outpatient Prescriptions   Medication   • diazepam (VALIUM) 10 MG tablet   • Insulin Glargine (TOUJEO SOLOSTAR) 300 UNIT/ML Solution Pen-injector   • hydrocodone/acetaminophen (NORCO)  MG Tab   • gabapentin (NEURONTIN) 300 MG Cap   • insulin lispro (HUMALOG) 100 UNIT/ML SOLN   • azithromycin (ZITHROMAX) 250 MG Tab   • MethylPREDNISolone (MEDROL DOSEPAK) 4 MG Tablet Therapy Pack   • cyclobenzaprine (FLEXERIL) 10 MG Tab   • carisoprodol (SOMA) 350 MG Tab   • hydrOXYzine (ATARAX) 25 MG Tab     No current facility-administered medications for this visit.       REVIEW OF SYSTEM:    Constitutional: Denies fevers, Denies weight changes   Eyes: Denies changes in vision, no eye pain   Ears/Nose/Throat/Mouth: Denies nasal congestion or sore throat   Cardiovascular: HTN  Respiratory: Denies SOB.   Gastrointestinal/Hepatic: Denies abdominal pain, nausea, vomiting,  "diarrhea, constipation or GI bleeding   Genitourinary: Denies bladder dysfunction, dysuria or frequency   Musculoskeletal/Rheum: arthritis   Skin/Breast: Denies rash, denies breast lumps or discharge   Neurological: neuropathy, autonomic neuropathy  Psychiatric: denies mood disorder   Endocrine: DM type I  Heme/Oncology/Lymph Nodes: Denies enlarged lymph nodes, denies brusing or known bleeding disorder   Allergic/Immunologic: Denies hx of allergies   )       PHYSICAL AND NEUROLOGICAL EXMAINATIONS:  VITAL SIGNS: /68 mmHg  Pulse 95  Temp(Src) 36.6 °C (97.9 °F)  Resp 16  Ht 1.651 m (5' 5\")  Wt 72.485 kg (159 lb 12.8 oz)  BMI 26.59 kg/m2  SpO2 98%  LMP 01/01/2002  CURRENT WEIGHT:   BMI: Body mass index is 26.59 kg/(m^2).  PREVIOUS WEIGHTS:  Wt Readings from Last 25 Encounters:   08/02/17 72.485 kg (159 lb 12.8 oz)   06/21/17 70.761 kg (156 lb)   06/02/17 72.122 kg (159 lb)   02/24/17 74.9 kg (165 lb 2 oz)   02/03/17 74.934 kg (165 lb 3.2 oz)   01/18/17 72.576 kg (160 lb)   01/20/17 73.8 kg (162 lb 11.2 oz)   01/05/17 73.483 kg (162 lb)   05/24/16 77.111 kg (170 lb)   05/18/16 77.5 kg (170 lb 13.7 oz)   02/01/16 79.379 kg (175 lb)   01/26/16 79.379 kg (175 lb)   09/22/15 77.111 kg (170 lb)   06/29/15 78.472 kg (173 lb)   02/09/15 75.3 kg (166 lb 0.1 oz)   01/16/15 71.668 kg (158 lb)   12/30/14 70.308 kg (155 lb)   11/22/14 71.668 kg (158 lb)   08/17/14 70.308 kg (155 lb)   07/16/14 68.04 kg (150 lb)   07/16/14 68.04 kg (150 lb)   03/12/14 68.493 kg (151 lb)   11/16/13 65 kg (143 lb 4.8 oz)   08/25/13 74.889 kg (165 lb 1.6 oz)   08/19/13 61.236 kg (135 lb)       General appearance of patient: WDWN(+) NAD(+)    EYES  o Fundus : Papilledem(-) Exudates(-) Hemorrhage(-)  Nervous System  Orientation to time, place and person(+)  Memory normal(-)  Language: aphasia(-)  Knowledge: past(+) Current(+)  Attention(+)  Cranial Nerves  • Nerve 2: intact  • Nerve 3,4,6: intact  • Nerve 5 : intact  • Nerve 7: intact  • " Nerve 8: intact  • Nerve 9 & 10: intact  • Nerve 11: intact  • Nerve 12: intact  Muscle Power and muscle tone: symmetric, normal in upper and lower  Sensory System: tingling and pain till the knees  Reflexes: absent  Cerebellar Function FNP normal   Gait : Steady(-)   Heart and Vascular  Peripheral Vasucular system : Edema (-) Swelling(-)  RHB, Breathing sound clear  abdomen bowel sound normoactive  Extremities freely moveable  Joints no contracture       NEUROIMAGING: I reviewed the MRI/CT of brain       Dorsal Pedis -- pulses are fine still    IMPRESSION:    1. Type I DM neuropathy- painful--- misable pain and tingling  2. Hx of arthritis, asthma, smoking, autoimmune disease      PLAN/RECOMMENDATIONS:    We are going to focus on the neuropathy--- agree with the patient that --- DM neuropathy  I am not convinced that insulin made her neuropathy worse ( the patient stated that the pain/tingling worsened with lower blood sugar could be transient)    Advise  1. Better control of blood sugar-- like less than 150--- despite the normal sugar may make the pain worse temporarily  2. Quit smoking    We will do blood tests to look for causes of  Reversible causes of neuropathy  It is reasonable to try the following too ( after blood tests)  ________________________________________________________________________    Fish Oil -- Omega 3 1000mg 3# daily  Try Daily Probiotic too  Vit D-3 4000 unit daily  ________________________________________________________________________      [facility]                SIGNATURE:  Saleem Win

## 2017-08-02 NOTE — MR AVS SNAPSHOT
"        Yuki Riddle   2017 2:40 PM   Office Visit   MRN: [id number]    Department:  [facility]   Dept Phone:  [phone]    Description:  Female : 1965   Provider:  Saleem Win M.D.           Reason for Visit     New Patient Neuropathy      Allergies as of 2017     Allergen Noted Reactions    Compazine 2008       Aggressive behavior     Sulfa Drugs 2008   Itching    Blotchy spots on chest      You were diagnosed with     Painful diabetic neuropathy (CMS-[facility])   [099015]       Neuropathy (CMS-[facility])   [[id number]]         Vital Signs     Blood Pressure Pulse Temperature Respirations Height Weight    150/68 mmHg 95 36.6 °C (97.9 °F) 16 1.651 m (5' 5\") 72.485 kg (159 lb 12.8 oz)    Body Mass Index Oxygen Saturation Last Menstrual Period Smoking Status          26.59 kg/m2 98% 2002 Current Every Day Smoker        Basic Information     Date Of Birth Sex Race Ethnicity Preferred Language    1965 Female White Non- English      Your appointments     Aug 04, 2017  4:00 PM   Established Patient Pul with SEKOU Rose   [facility] Pulmonary Medicine (--)    [address]  [address]  [facility] [address] [phone]            Aug 09, 2017  3:15 PM   LOWER EXTREMITY ART DUPLEX with VASCULAR LAB [facility] [place] [facility], [facility] EXAM 6   NON-INVASIVE LAB [facility] [place] [facility] ([facility])    [address] [address] [phone]            Aug 16, 2017  4:00 PM   Follow Up Visit with Yunior Vanegas M.D.   [facility] PHYSIATRY (--)    [address]  [facility] [address] [phone]           You will be receiving a confirmation call a few days before your appointment from our automated call confirmation system.            Dec 08, 2017  3:40 PM   Follow Up Visit with Saleem Win M.D.   [facility] Neurology (--)    [address], [address]  [facility] [address] [phone]           You will be receiving a confirmation call a few days " before your appointment from our automated call confirmation system.              Problem List              ICD-10-CM Priority Class Noted - Resolved    Foot fracture, left S92.902A   1/23/2013 - Present    Type 1 diabetes mellitus with hyperglycemia (CMS-HCC) E10.65 High  1/23/2013 - Present    Fibromyalgia M79.7 Low  8/23/2013 - Present    Tobacco dependence F17.200 Low  8/26/2013 - Present    Diabetic foot ulcer (CMS-HCC) E11.621, L97.509   11/16/2013 - Present    Opiate dependence (CMS-HCC) F11.20   11/17/2013 - Present    Acute exacerbation of COPD with asthma (CMS-HCC) J44.1, J45.901 High  3/13/2014 - Present    Intractable nausea and vomiting R11.2 High  2/9/2015 - Present    Volume depletion E86.9 High  2/9/2015 - Present    Hyponatremia E87.1 High  2/9/2015 - Present    Elevated blood pressure [id number] Low  2/9/2015 - Present    Peripheral neuropathy G62.9   2/9/2015 - Present    Candida esophagitis (CMS-HCC) B37.81   2/14/2015 - Present    DKA, type 1 (CMS-HCC) E10.10 High  5/13/2016 - Present    Acute narcotic withdrawal (CMS-HCC) F11.23   5/13/2016 - Present    Abnormal chest x-ray R93.8   1/5/2017 - Present    Abnormal CT scan, lung R91.8   1/5/2017 - Present    COPD (chronic obstructive pulmonary disease) (CMS-HCC) J44.9   1/5/2017 - Present    Bronchitis J40   6/2/2017 - Present    Smokes 1/2 pack per day F17.210   6/2/2017 - Present    Painful diabetic neuropathy (CMS-HCC) E11.40   8/2/2017 - Present    Neuropathy (CMS-HCC) G62.9   8/2/2017 - Present      Health Maintenance        Date Due Completion Dates    IMM HEP B VACCINE (1 of 3 - Primary Series) 1965 ---    DIABETES MONOFILAMENT / LE EXAM 2/15/1967 ---    RETINAL SCREENING 8/15/1984 ---    FASTING LIPID PROFILE 8/15/1984 ---    URINE ACR / MICROALBUMIN 8/15/1984 ---    PAP SMEAR 8/15/1987 ---    A1C SCREENING 2/22/2014 8/22/2013, 1/24/2013, 6/20/2009    COLONOSCOPY 8/15/2016 ---    IMM INFLUENZA (1) 9/1/2017 11/18/2013    SERUM CREATININE  2/24/2018 2/24/2017, 1/20/2017, 1/3/2017, 5/24/2016, 5/20/2016, 5/19/2016, 5/18/2016, 5/17/2016, 5/16/2016, 5/15/2016, 5/14/2016, 5/14/2016, 5/13/2016, 5/13/2016, 5/13/2016, 2/1/2016, 1/26/2016, 9/22/2015, 6/29/2015, 2/13/2015, 2/12/2015, 2/12/2015, 2/10/2015, 2/9/2015, 1/16/2015, 8/17/2014, 3/15/2014, 3/14/2014, 3/13/2014, 3/12/2014, 11/17/2013, 11/16/2013, 8/26/2013, 8/25/2013, 8/23/2013, 8/22/2013, 8/19/2013, 6/18/2013, 3/26/2013, 1/24/2013, 1/23/2013, 6/20/2009, 12/13/2008, 5/28/2007    MAMMOGRAM 4/13/2018 4/13/2017    IMM DTaP/Tdap/Td Vaccine (2 - Td) 11/22/2024 11/22/2014            Current Immunizations     Influenza Vaccine Pediatric 11/18/2013  9:58 AM    Pneumococcal polysaccharide vaccine (PPSV-23) 3/15/2014  1:04 PM    Tdap Vaccine 11/22/2014 11:26 PM      Below and/or attached are the medications your provider expects you to take. Review all of your home medications and newly ordered medications with your provider and/or pharmacist. Follow medication instructions as directed by your provider and/or pharmacist. Please keep your medication list with you and share with your provider. Update the information when medications are discontinued, doses are changed, or new medications (including over-the-counter products) are added; and carry medication information at all times in the event of emergency situations     Allergies:  COMPAZINE - (reactions not documented)     SULFA DRUGS - Itching               Medications  Valid as of: August 02, 2017 -  3:29 PM    Generic Name Brand Name Tablet Size Instructions for use    Azithromycin (Tab) ZITHROMAX 250 MG Take 2 tablets on day 1, then take 1 tablet a day for 4 days.        Carisoprodol (Tab) SOMA 350 MG Take 350 mg by mouth every 8 hours as needed for Muscle Spasms.        Cyclobenzaprine HCl (Tab) FLEXERIL 10 MG Take 10 mg by mouth 2 times a day as needed.        DiazePAM (Tab) VALIUM 10 MG Take 10 mg by mouth every 6 hours as needed for Anxiety.         Gabapentin (Cap) NEURONTIN 300 MG Take 600 mg by mouth 2 Times a Day.        Hydrocodone-Acetaminophen (Tab) NORCO  MG Take 1-2 Tabs by mouth every 6 hours as needed.        HydrOXYzine HCl (Tab) ATARAX 25 MG Take 25 mg by mouth every 12 hours.        Insulin Glargine (Solution Pen-injector) Insulin Glargine 300 UNIT/ML Inject  as instructed.        Insulin Lispro (Solution) HUMALOG 100 UNIT/ML Inject 2-10 Units as instructed 5 Times a Day. Unable to provide SS        MethylPREDNISolone (Tablet Therapy Pack) MEDROL DOSEPAK 4 MG Instructions per package        .                 Medicines prescribed today were sent to:     [facility] - [place], [place] - [address]    [address] [facility] [address]    Phone: [phone] Fax: [phone]    Open 24 Hours?: No      Medication refill instructions:       If your prescription bottle indicates you have medication refills left, it is not necessary to call your provider’s office. Please contact your pharmacy and they will refill your medication.    If your prescription bottle indicates you do not have any refills left, you may request refills at any time through one of the following ways: The online [facility] system (except Urgent Care), by calling your provider’s office, or by asking your pharmacy to contact your provider’s office with a refill request. Medication refills are processed only during regular business hours and may not be available until the next business day. Your provider may request additional information or to have a follow-up visit with you prior to refilling your medication.   *Please Note: Medication refills are assigned a new Rx number when refilled electronically. Your pharmacy may indicate that no refills were authorized even though a new prescription for the same medication is available at the pharmacy. Please request the medicine by name with the pharmacy before contacting your provider for a refill.        Your  To Do List     Future Labs/Procedures Complete By Expires    NABEEL ANTIBODY WITH REFLEX  As directed 8/3/2018    ANTICARDIOLIPIN AB IGG,IGM,IGA  As directed 8/2/2018    ANTITHYROGLOBULIN AB  As directed 8/2/2018    CCP ANTIBODY  As directed 8/2/2018    CONNECTIVE TISSUE DISEASES PROFILE  As directed 8/2/2018    CRP QUANTITIVE (NON-CARDIAC)  As directed 8/2/2018    BIMAL-65  As directed 8/2/2018    IRON/TOTAL IRON BIND  As directed 8/3/2018    RHEUMATOID ARTHRITIS FACTOR  As directed 8/3/2018    THYROID PEROXIDASE  (TPO) AB  As directed 8/2/2018    VITAMIN B12  As directed 8/3/2018    VITAMIN B6  As directed 8/2/2018    VITAMIN D,25 HYDROXY  As directed 8/2/2018      Referral     A referral request has been sent to our patient care coordination department. Please allow 3-5 business days for us to process this request and contact you either by phone or mail. If you do not hear from us by the 5th business day, please call us at [phone].        Instructions      Dorsal Pedis -- pulses are fine still    IMPRESSION:    1. Type I DM neuropathy- painful--- misable pain and tingling  2. Hx of arthritis, asthma, smoking, autoimmune disease      PLAN/RECOMMENDATIONS:    We are going to focus on the neuropathy--- agree with the patient that --- DM neuropathy  I am not convinced that insulin made her neuropathy worse ( the patient stated that the pain/tingling worsened with lower blood sugar could be transient)    Advise  1. Better control of blood sugar-- like less than 150--- despite the normal sugar may make the pain worse temporarily  2. Quit smoking    We will do blood tests to look for causes of  Reversible causes of neuropathy  It is reasonable to try the following too ( after blood tests)  ________________________________________________________________________    Fish Oil -- Omega 3 1000mg 3# daily  Try Daily Probiotic too  Vit D-3 4000 unit  daily  ________________________________________________________________________      [facility]                SIGNATURE:  Saleem Hernandezbrentlance Access Code: [email or web address]  Expires: 9/1/2017  3:29 PM    CSR  A secure, online tool to manage your health information     [employer] CSR® is a secure, online tool that connects you to your personalized health information from the privacy of your home -- day or night - making it very easy for you to manage your healthcare. Once the activation process is completed, you can even access your medical information using the CSR bhumika, which is available for free in the Apple Bhumika store or Google Play store.     CSR provides the following levels of access (as shown below):   My Chart Features   [facility] Doctor [facility]  Specialists [facility]  Urgent  Care Non-[facility]  Primary Care  Doctor   Email your healthcare team securely and privately 24/7 X X X    Manage appointments: schedule your next appointment; view details of past/upcoming appointments X      Request prescription refills. X      View recent personal medical records, including lab and immunizations X X X X   View health record, including health history, allergies, medications X X X X   Read reports about your outpatient visits, procedures, consult and ER notes X X X X   See your discharge summary, which is a recap of your hospital and/or ER visit that includes your diagnosis, lab results, and care plan. X X       How to register for CSR:  1. Go to  [email or web address]  2. Click on the Sign Up Now box, which takes you to the New Member Sign Up page. You will need to provide the following information:  a. Enter your CSR Access Code exactly as it appears at the top of this page. (You will not need to use this code after you’ve completed the sign-up process. If you do not sign up before the expiration date, you must request a new code.)   b. Enter your date of birth.    c. Enter your home email address.   d. Click Submit, and follow the next screen’s instructions.  3. Create a [email or web address]@ ID. This will be your [email or web address]@ login ID and cannot be changed, so think of one that is secure and easy to remember.  4. Create a [email or web address]@ password. You can change your password at any time.  5. Enter your Password Reset Question and Answer. This can be used at a later time if you forget your password.   6. Enter your e-mail address. This allows you to receive e-mail notifications when new information is available in [email or web address]@.  7. Click Sign Up. You can now view your health information.    For assistance activating your [email or web address]@ account, call [phone]        Quit Tobacco Information     Do you want to quit using tobacco?    Quitting tobacco decreases risks of cancer, heart and lung disease, increases life expectancy, improves sense of taste and smell, and increases spending money, among other benefits.    If you are thinking about quitting, we can help.  • [facility] Quit Tobacco Program: 161.173.4312  o Program occurs weekly for four weeks and includes pharmacist consultation on products to support quitting smoking or chewing tobacco. A provider referral is needed for pharmacist consultation.  • Tobacco Users Help Hotline: 9-198-QUITNOW (232-3857) or https://[place].quitlogix.org/  o Free, confidential telephone and online coaching for [place] residents. Sessions are designed on a schedule that is convenient for you. Eligible clients receive free nicotine replacement therapy.  • Nationally: www.smokefree.gov  o Information and professional assistance to support both immediate and long-term needs as you become, and remain, a non-smoker. Smokefree.gov allows you to choose the help that best fits your needs.

## 2017-08-04 ENCOUNTER — APPOINTMENT (OUTPATIENT)
Dept: PULMONOLOGY | Facility: HOSPICE | Age: 52
End: 2017-08-04
Payer: MEDICAID

## 2017-08-04 LAB
CARDIOLIPIN IGA SER IA-ACNC: 1 APL (ref 0–11)
CARDIOLIPIN IGG SER IA-ACNC: 0 GPL (ref 0–14)
CARDIOLIPIN IGM SER IA-ACNC: 2 MPL (ref 0–12)
CCP IGG SERPL-ACNC: 4 UNITS (ref 0–19)
CENTROMERE IGG TITR SER IF: 0 AU/ML (ref 0–40)
ENA JO1 AB TITR SER: 0 AU/ML (ref 0–40)
ENA SCL70 IGG SER QL: 0 AU/ML (ref 0–40)
ENA SM IGG SER-ACNC: 0 AU/ML (ref 0–40)
ENA SS-B IGG SER IA-ACNC: 0 AU/ML (ref 0–40)
NUCLEAR IGG SER QL IA: NORMAL
RIBOSOMAL P AB SER-ACNC: 1 AU/ML (ref 0–40)
SSA52 R0ENA AB IGG Q0420: 1 AU/ML (ref 0–40)
SSA60 R0ENA AB IGG Q0419: 1 AU/ML (ref 0–40)
THYROGLOB AB SERPL-ACNC: <0.9 IU/ML (ref 0–4)
U1 SNRNP IGG SER QL: 0 AU/ML (ref 0–40)

## 2017-08-05 LAB — VIT B6 SERPL-MCNC: 34.6 NMOL/L (ref 20–125)

## 2017-08-07 LAB — GAD65 AB SER IA-ACNC: >250 IU/ML (ref 0–5)

## 2017-08-08 ENCOUNTER — TELEPHONE (OUTPATIENT)
Dept: NEUROLOGY | Facility: MEDICAL CENTER | Age: 52
End: 2017-08-08

## 2017-08-08 NOTE — TELEPHONE ENCOUNTER
Saleem Win M.D.  Marlena Perez, Med Ass't                     Due to Vit D deficiency, please take vit D-3   4000unit    daily         Letter sent

## 2017-08-08 NOTE — Clinical Note
August 8, 2017        Yuki Riddle  [address]  [place] [place] [facility] [address]        Dear Yuki:    Your labs have been reviewed. Your Vitamin D is low at 19 . The range is . I am asking that you please get a Vit D 3 supplement over the counter at any pharmacy. Please start taking 4,000 i.u. daily.     If you have any questions or concerns, please don't hesitate to call.        Sincerely,                  Marlena Perez, Med Ass't

## 2017-08-16 ENCOUNTER — APPOINTMENT (OUTPATIENT)
Dept: PHYSICAL MEDICINE AND REHAB | Facility: MEDICAL CENTER | Age: 52
End: 2017-08-16
Payer: MEDICAID

## 2017-11-05 ENCOUNTER — APPOINTMENT (OUTPATIENT)
Dept: RADIOLOGY | Facility: MEDICAL CENTER | Age: 52
End: 2017-11-05
Attending: EMERGENCY MEDICINE
Payer: MEDICAID

## 2017-11-05 ENCOUNTER — HOSPITAL ENCOUNTER (EMERGENCY)
Facility: MEDICAL CENTER | Age: 52
End: 2017-11-05
Attending: EMERGENCY MEDICINE
Payer: MEDICAID

## 2017-11-05 VITALS
HEART RATE: 94 BPM | OXYGEN SATURATION: 94 % | DIASTOLIC BLOOD PRESSURE: 92 MMHG | WEIGHT: 158.73 LBS | SYSTOLIC BLOOD PRESSURE: 197 MMHG | HEIGHT: 65 IN | RESPIRATION RATE: 16 BRPM | BODY MASS INDEX: 26.45 KG/M2 | TEMPERATURE: 97.9 F

## 2017-11-05 DIAGNOSIS — E10.10 TYPE 1 DIABETES MELLITUS WITH KETOACIDOSIS WITHOUT COMA (HCC): ICD-10-CM

## 2017-11-05 DIAGNOSIS — N30.00 ACUTE CYSTITIS WITHOUT HEMATURIA: ICD-10-CM

## 2017-11-05 DIAGNOSIS — N95.0 POST-MENOPAUSAL BLEEDING: ICD-10-CM

## 2017-11-05 LAB
ANION GAP SERPL CALC-SCNC: 8 MMOL/L (ref 0–11.9)
APPEARANCE UR: CLEAR
BACTERIA #/AREA URNS HPF: ABNORMAL /HPF
BASOPHILS # BLD AUTO: 0.8 % (ref 0–1.8)
BASOPHILS # BLD: 0.06 K/UL (ref 0–0.12)
BILIRUB UR QL STRIP.AUTO: NEGATIVE
BUN SERPL-MCNC: 11 MG/DL (ref 8–22)
CALCIUM SERPL-MCNC: 8.6 MG/DL (ref 8.4–10.2)
CASTS URNS QL MICRO: ABNORMAL /LPF
CHLORIDE SERPL-SCNC: 100 MMOL/L (ref 96–112)
CO2 SERPL-SCNC: 27 MMOL/L (ref 20–33)
COLOR UR: YELLOW
CREAT SERPL-MCNC: 1.01 MG/DL (ref 0.5–1.4)
CULTURE IF INDICATED INDCX: YES UA CULTURE
EOSINOPHIL # BLD AUTO: 0.19 K/UL (ref 0–0.51)
EOSINOPHIL NFR BLD: 2.5 % (ref 0–6.9)
EPI CELLS #/AREA URNS HPF: ABNORMAL /HPF
ERYTHROCYTE [DISTWIDTH] IN BLOOD BY AUTOMATED COUNT: 42.7 FL (ref 35.9–50)
GFR SERPL CREATININE-BSD FRML MDRD: 58 ML/MIN/1.73 M 2
GLUCOSE SERPL-MCNC: 342 MG/DL (ref 65–99)
GLUCOSE UR STRIP.AUTO-MCNC: 250 MG/DL
HCT VFR BLD AUTO: 30.4 % (ref 37–47)
HGB BLD-MCNC: 10.9 G/DL (ref 12–16)
IMM GRANULOCYTES # BLD AUTO: 0.02 K/UL (ref 0–0.11)
IMM GRANULOCYTES NFR BLD AUTO: 0.3 % (ref 0–0.9)
KETONES UR STRIP.AUTO-MCNC: NEGATIVE MG/DL
LEUKOCYTE ESTERASE UR QL STRIP.AUTO: ABNORMAL
LYMPHOCYTES # BLD AUTO: 1.42 K/UL (ref 1–4.8)
LYMPHOCYTES NFR BLD: 18.7 % (ref 22–41)
MCH RBC QN AUTO: 32.8 PG (ref 27–33)
MCHC RBC AUTO-ENTMCNC: 35.9 G/DL (ref 33.6–35)
MCV RBC AUTO: 91.6 FL (ref 81.4–97.8)
MICRO URNS: ABNORMAL
MONOCYTES # BLD AUTO: 0.52 K/UL (ref 0–0.85)
MONOCYTES NFR BLD AUTO: 6.8 % (ref 0–13.4)
MUCOUS THREADS #/AREA URNS HPF: ABNORMAL /HPF
NEUTROPHILS # BLD AUTO: 5.4 K/UL (ref 2–7.15)
NEUTROPHILS NFR BLD: 70.9 % (ref 44–72)
NITRITE UR QL STRIP.AUTO: NEGATIVE
NRBC # BLD AUTO: 0 K/UL
NRBC BLD AUTO-RTO: 0 /100 WBC
PH UR STRIP.AUTO: 6 [PH]
PLATELET # BLD AUTO: 166 K/UL (ref 164–446)
PMV BLD AUTO: 11.5 FL (ref 9–12.9)
POTASSIUM SERPL-SCNC: 3.4 MMOL/L (ref 3.6–5.5)
PROT UR QL STRIP: 30 MG/DL
RBC # BLD AUTO: 3.32 M/UL (ref 4.2–5.4)
RBC # URNS HPF: ABNORMAL /HPF
RBC UR QL AUTO: ABNORMAL
SODIUM SERPL-SCNC: 135 MMOL/L (ref 135–145)
SP GR UR STRIP.AUTO: 1.01
WBC # BLD AUTO: 7.6 K/UL (ref 4.8–10.8)
WBC #/AREA URNS HPF: ABNORMAL /HPF

## 2017-11-05 PROCEDURE — 36415 COLL VENOUS BLD VENIPUNCTURE: CPT

## 2017-11-05 PROCEDURE — 96375 TX/PRO/DX INJ NEW DRUG ADDON: CPT

## 2017-11-05 PROCEDURE — A9270 NON-COVERED ITEM OR SERVICE: HCPCS | Performed by: EMERGENCY MEDICINE

## 2017-11-05 PROCEDURE — 87086 URINE CULTURE/COLONY COUNT: CPT

## 2017-11-05 PROCEDURE — 99285 EMERGENCY DEPT VISIT HI MDM: CPT

## 2017-11-05 PROCEDURE — 96365 THER/PROPH/DIAG IV INF INIT: CPT

## 2017-11-05 PROCEDURE — 700111 HCHG RX REV CODE 636 W/ 250 OVERRIDE (IP): Performed by: EMERGENCY MEDICINE

## 2017-11-05 PROCEDURE — 81001 URINALYSIS AUTO W/SCOPE: CPT

## 2017-11-05 PROCEDURE — 94760 N-INVAS EAR/PLS OXIMETRY 1: CPT

## 2017-11-05 PROCEDURE — 76830 TRANSVAGINAL US NON-OB: CPT

## 2017-11-05 PROCEDURE — 85025 COMPLETE CBC W/AUTO DIFF WBC: CPT

## 2017-11-05 PROCEDURE — 700102 HCHG RX REV CODE 250 W/ 637 OVERRIDE(OP): Performed by: EMERGENCY MEDICINE

## 2017-11-05 PROCEDURE — 80048 BASIC METABOLIC PNL TOTAL CA: CPT

## 2017-11-05 PROCEDURE — 700105 HCHG RX REV CODE 258: Performed by: EMERGENCY MEDICINE

## 2017-11-05 RX ORDER — PHENAZOPYRIDINE HYDROCHLORIDE 200 MG/1
200 TABLET, FILM COATED ORAL ONCE
Status: COMPLETED | OUTPATIENT
Start: 2017-11-05 | End: 2017-11-05

## 2017-11-05 RX ORDER — HYDROCODONE BITARTRATE AND ACETAMINOPHEN 10; 325 MG/1; MG/1
1 TABLET ORAL ONCE
Status: COMPLETED | OUTPATIENT
Start: 2017-11-05 | End: 2017-11-05

## 2017-11-05 RX ORDER — KETOROLAC TROMETHAMINE 30 MG/ML
30 INJECTION, SOLUTION INTRAMUSCULAR; INTRAVENOUS ONCE
Status: COMPLETED | OUTPATIENT
Start: 2017-11-05 | End: 2017-11-05

## 2017-11-05 RX ORDER — CEFDINIR 300 MG/1
300 CAPSULE ORAL 2 TIMES DAILY
Qty: 14 CAP | Refills: 0 | Status: SHIPPED | OUTPATIENT
Start: 2017-11-05 | End: 2017-11-12

## 2017-11-05 RX ORDER — SODIUM CHLORIDE 9 MG/ML
1000 INJECTION, SOLUTION INTRAVENOUS ONCE
Status: COMPLETED | OUTPATIENT
Start: 2017-11-05 | End: 2017-11-05

## 2017-11-05 RX ORDER — IBUPROFEN 200 MG
400 TABLET ORAL EVERY 6 HOURS PRN
Status: ON HOLD | COMMUNITY
End: 2017-11-27

## 2017-11-05 RX ORDER — PHENAZOPYRIDINE HYDROCHLORIDE 200 MG/1
200 TABLET, FILM COATED ORAL 3 TIMES DAILY PRN
Qty: 6 TAB | Refills: 0 | Status: ON HOLD | OUTPATIENT
Start: 2017-11-05 | End: 2017-11-29

## 2017-11-05 RX ORDER — ONDANSETRON 2 MG/ML
4 INJECTION INTRAMUSCULAR; INTRAVENOUS ONCE
Status: COMPLETED | OUTPATIENT
Start: 2017-11-05 | End: 2017-11-05

## 2017-11-05 RX ORDER — CEFTRIAXONE 2 G/1
2 INJECTION, POWDER, FOR SOLUTION INTRAMUSCULAR; INTRAVENOUS ONCE
Status: COMPLETED | OUTPATIENT
Start: 2017-11-05 | End: 2017-11-05

## 2017-11-05 RX ADMIN — KETOROLAC TROMETHAMINE 30 MG: 30 INJECTION, SOLUTION INTRAMUSCULAR at 07:57

## 2017-11-05 RX ADMIN — SODIUM CHLORIDE 1000 ML: 9 INJECTION, SOLUTION INTRAVENOUS at 07:57

## 2017-11-05 RX ADMIN — CEFTRIAXONE SODIUM 2 G: 2 INJECTION, POWDER, FOR SOLUTION INTRAMUSCULAR; INTRAVENOUS at 09:13

## 2017-11-05 RX ADMIN — ONDANSETRON 4 MG: 2 INJECTION, SOLUTION INTRAMUSCULAR; INTRAVENOUS at 07:57

## 2017-11-05 RX ADMIN — PHENAZOPYRIDINE HYDROCHLORIDE 200 MG: 200 TABLET ORAL at 09:13

## 2017-11-05 RX ADMIN — HYDROCODONE BITARTRATE AND ACETAMINOPHEN 1 TABLET: 10; 325 TABLET ORAL at 09:49

## 2017-11-05 RX ADMIN — HYDROMORPHONE HYDROCHLORIDE 1 MG: 1 INJECTION, SOLUTION INTRAMUSCULAR; INTRAVENOUS; SUBCUTANEOUS at 07:57

## 2017-11-05 NOTE — DISCHARGE INSTRUCTIONS
Urinary Tract Infection  Urinary tract infections (UTIs) can develop anywhere along your urinary tract. Your urinary tract is your body's drainage system for removing wastes and extra water. Your urinary tract includes two kidneys, two ureters, a bladder, and a urethra. Your kidneys are a pair of bean-shaped organs. Each kidney is about the size of your fist. They are located below your ribs, one on each side of your spine.  CAUSES  Infections are caused by microbes, which are microscopic organisms, including fungi, viruses, and bacteria. These organisms are so small that they can only be seen through a microscope. Bacteria are the microbes that most commonly cause UTIs.  SYMPTOMS   Symptoms of UTIs may vary by age and gender of the patient and by the location of the infection. Symptoms in young women typically include a frequent and intense urge to urinate and a painful, burning feeling in the bladder or urethra during urination. Older women and men are more likely to be tired, shaky, and weak and have muscle aches and abdominal pain. A fever may mean the infection is in your kidneys. Other symptoms of a kidney infection include pain in your back or sides below the ribs, nausea, and vomiting.  DIAGNOSIS  To diagnose a UTI, your caregiver will ask you about your symptoms. Your caregiver also will ask to provide a urine sample. The urine sample will be tested for bacteria and white blood cells. White blood cells are made by your body to help fight infection.  TREATMENT   Typically, UTIs can be treated with medication. Because most UTIs are caused by a bacterial infection, they usually can be treated with the use of antibiotics. The choice of antibiotic and length of treatment depend on your symptoms and the type of bacteria causing your infection.  HOME CARE INSTRUCTIONS  · If you were prescribed antibiotics, take them exactly as your caregiver instructs you. Finish the medication even if you feel better after you  have only taken some of the medication.  · Drink enough water and fluids to keep your urine clear or pale yellow.  · Avoid caffeine, tea, and carbonated beverages. They tend to irritate your bladder.  · Empty your bladder often. Avoid holding urine for long periods of time.  · Empty your bladder before and after sexual intercourse.  · After a bowel movement, women should cleanse from front to back. Use each tissue only once.  SEEK MEDICAL CARE IF:   · You have back pain.  · You develop a fever.  · Your symptoms do not begin to resolve within 3 days.  SEEK IMMEDIATE MEDICAL CARE IF:   · You have severe back pain or lower abdominal pain.  · You develop chills.  · You have nausea or vomiting.  · You have continued burning or discomfort with urination.  MAKE SURE YOU:   · Understand these instructions.  · Will watch your condition.  · Will get help right away if you are not doing well or get worse.     This information is not intended to replace advice given to you by your health care provider. Make sure you discuss any questions you have with your health care provider.     Document Released: 09/27/2006 Document Revised: 01/08/2016 Document Reviewed: 01/25/2013  [employer] Interactive Patient Education ©2016 [employer]      Postmenopausal Bleeding  Postmenopausal bleeding is any bleeding a woman has after she has entered into menopause. Menopause is the end of a woman's fertile years. After menopause, a woman no longer ovulates or has menstrual periods.   Postmenopausal bleeding can be caused by various things. Any type of postmenopausal bleeding, even if it appears to be a typical menstrual period, is concerning. This should be evaluated by your health care provider. Any treatment will depend on the cause of the bleeding.  HOME CARE INSTRUCTIONS  Monitor your condition for any changes. The following actions may help to alleviate any discomfort you are experiencing:  · Avoid the use of tampons and douches as directed  by your health care provider.   · Change your pads frequently.  · Get regular pelvic exams and Pap tests.  · Keep all follow-up appointments for diagnostic tests as directed by your health care provider.  SEEK MEDICAL CARE IF:   · Your bleeding lasts more than 1 week.  · You have abdominal pain.  · You have bleeding with sexual intercourse.  SEEK IMMEDIATE MEDICAL CARE IF:   · You have a fever, chills, headache, dizziness, muscle aches, and bleeding.  · You have severe pain with bleeding.  · You are passing blood clots.  · You have bleeding and need more than 1 pad an hour.  · You feel faint.  MAKE SURE YOU:  · Understand these instructions.  · Will watch your condition.  · Will get help right away if you are not doing well or get worse.     This information is not intended to replace advice given to you by your health care provider. Make sure you discuss any questions you have with your health care provider.     Document Released: 03/28/2007 Document Revised: 10/08/2014 Document Reviewed: 07/17/2014  [employer] Interactive Patient Education ©2016 [employer].

## 2017-11-05 NOTE — ED PROVIDER NOTES
ED Provider Note    CHIEF COMPLAINT  Chief Complaint   Patient presents with   • Pelvic Pain   • Leg Pain   • Flank Pain   • Vaginal Bleeding       \[facility]\""  Yuki Riddle is a 52 y.o. female who presents To the emergency department with a chief complaint of lower abdominal pain with pain radiating to her low back. The patient notes that she may have had some light vaginal bleeding. She did not have any blood in her underwear but noticed some clots if she inserted her finger into the vagina. The patient does not routinely follow up with a gynecologist. She denies any dysuria. No nausea or vomiting. The patient states that she does suffer from intermittent urinary tract infections.    REVIEW OF SYSTEMS  See HPI for further details. All other systems are negative.     PAST MEDICAL HISTORY  Past Medical History:   Diagnosis Date   • Arthritis    • Asthma     no inhalers   • Bowel habit changes     diarrhea   • Breath shortness    • Bronchitis 12/2016   • Cataract    • Cold 12/3/2016   • Dental disorder     upper and lower dentures   • Diabetes     juvenile, type I   • Diabetic retinopathy    • Fibromyalgia    • Heart burn    • Hypertension    • Indigestion    • Migraine    • Neuropathy (CMS-HCC)    • Pneumonia 1993   • Psychiatric problem    • Rheumatoid arthritis (CMS-HCC)    • Sleep apnea     cpap ordered, doesn't use   • TIA (transient ischemic attack)    • Urinary bladder disorder    • Urinary incontinence        FAMILY HISTORY  Family History   Problem Relation Age of Onset   • No Known Problems Sister    • No Known Problems Brother    • No Known Problems Brother    • No Known Problems Daughter    • No Known Problems Daughter        SOCIAL HISTORY  Social History     Social History   • Marital status:      Spouse name: N/A   • Number of children: N/A   • Years of education: N/A     Social History Main Topics   • Smoking status: Current Every Day Smoker     Packs/day: 0.50     Years: 29.00     Types:  "Cigarettes   • Smokeless tobacco: Never Used      Comment: 1/2 PPD   • Alcohol use No   • Drug use: No   • Sexual activity: Not on file     Other Topics Concern   • Not on file     Social History Narrative   • No narrative on file       SURGICAL HISTORY  Past Surgical History:   Procedure Laterality Date   • GASTROSCOPY WITH BALLOON DILATATION  2/13/2015    Performed by Venancio Aburto M.D. at SURGERY [facility] ORS   • GASTROSCOPY WITH BIOPSY  2/13/2015    Performed by Venancio Aburto M.D. at SURGERY [facility] ORS   • FOREIGN BODY REMOVAL  6/18/2013    Performed by Raz Mari M.D. at SURGERY SAME DAY [facility] ORS   • CHOLECYSTECTOMY     • OTHER      Skin disorder of unknown name   • PRIMARY C SECTION     • TONSILLECTOMY         CURRENT MEDICATIONS  Home Medications     Reviewed by Sal Clinton (Pharmacy Tech) on 11/05/17 at 0852  Med List Status: Complete   Medication Last Dose Status   carisoprodol (SOMA) 350 MG Tab 11/5/2017 Active   cyclobenzaprine (FLEXERIL) 10 MG Tab 11/3/2017 Active   diazepam (VALIUM) 10 MG tablet 11/4/2017 Active   gabapentin (NEURONTIN) 300 MG Cap > 4 days Active   hydrocodone/acetaminophen (NORCO)  MG Tab 11/4/2017 Active   ibuprofen (MOTRIN) 200 MG Tab 11/4/2017 Active   Insulin Glargine (TOUJEO SOLOSTAR) 300 UNIT/ML Solution Pen-injector 11/4/2017 Active   insulin lispro (HUMALOG) 100 UNIT/ML Solution 11/5/2017 Active                ALLERGIES  Allergies   Allergen Reactions   • Compazine      Aggressive behavior    • Sulfa Drugs Itching     Blotchy spots on chest       PHYSICAL EXAM  VITAL SIGNS: BP (!) 197/92   Pulse 95   Temp 36.6 °C (97.9 °F)   Resp 16   Ht 1.651 m (5' 5\")   Wt 72 kg (158 lb 11.7 oz)   LMP 01/01/2002   SpO2 90%   BMI 26.41 kg/m²   Constitutional: Well developed, Well nourished,No acute distress, Non-toxic appearance.   HENT: Normocephalic, Atraumatic, Bilateral external ears normal, Oropharynx moist, No oral exudates, " Nose normal.   Eyes: PERRL, EOMI, Conjunctiva normal, No discharge.   Neck: Normal range of motion, No tenderness, Supple, No stridor.   Lymphatic: No lymphadenopathy noted.   Cardiovascular: Normal heart rate, Normal rhythm, No murmurs, No rubs, No gallops.   Thorax & Lungs: Normal breath sounds, No respiratory distress, No wheezing, No chest tenderness.   Abdomen: Soft, mild tenderness in the suprapubic and left lower quadrant, no peritoneal signs. No tenderness in the right lower quadrant. Active bowel sounds. No palpable masses.   Skin: Multiple excoriations noted, Warm, Dry, No erythema, No rash.   Back: No tenderness, No CVA tenderness.   Extremities: Intact distal pulses, No edema, No tenderness, No cyanosis, No clubbing.   Neurologic: Alert & oriented x 3, Normal motor function, Normal sensory function, No focal deficits noted.       RADIOLOGY/PROCEDURES  US-GYN-PELVIS TRANSVAGINAL   Final Result      1.  Endometrial stripe thickness measured at 7 mm.      2.  Heterogeneous uterus. No discrete fibroid seen.      3.  Right ovary not identified. No evidence of adnexal mass.          Results for orders placed or performed during the hospital encounter of 11/05/17   CBC WITH DIFFERENTIAL   Result Value Ref Range    WBC 7.6 4.8 - 10.8 K/uL    RBC 3.32 (L) 4.20 - 5.40 M/uL    Hemoglobin 10.9 (L) 12.0 - 16.0 g/dL    Hematocrit 30.4 (L) 37.0 - 47.0 %    MCV 91.6 81.4 - 97.8 fL    MCH 32.8 27.0 - 33.0 pg    MCHC 35.9 (H) 33.6 - 35.0 g/dL    RDW 42.7 35.9 - 50.0 fL    Platelet Count 166 164 - 446 K/uL    MPV 11.5 9.0 - 12.9 fL    Neutrophils-Polys 70.90 44.00 - 72.00 %    Lymphocytes 18.70 (L) 22.00 - 41.00 %    Monocytes 6.80 0.00 - 13.40 %    Eosinophils 2.50 0.00 - 6.90 %    Basophils 0.80 0.00 - 1.80 %    Immature Granulocytes 0.30 0.00 - 0.90 %    Nucleated RBC 0.00 /100 WBC    Neutrophils (Absolute) 5.40 2.00 - 7.15 K/uL    Lymphs (Absolute) 1.42 1.00 - 4.80 K/uL    Monos (Absolute) 0.52 0.00 - 0.85 K/uL    Eos  (Absolute) 0.19 0.00 - 0.51 K/uL    Baso (Absolute) 0.06 0.00 - 0.12 K/uL    Immature Granulocytes (abs) 0.02 0.00 - 0.11 K/uL    NRBC (Absolute) 0.00 K/uL   BASIC METABOLIC PANEL   Result Value Ref Range    Sodium 135 135 - 145 mmol/L    Potassium 3.4 (L) 3.6 - 5.5 mmol/L    Chloride 100 96 - 112 mmol/L    Co2 27 20 - 33 mmol/L    Glucose 342 (H) 65 - 99 mg/dL    Bun 11 8 - 22 mg/dL    Creatinine 1.01 0.50 - 1.40 mg/dL    Calcium 8.6 8.4 - 10.2 mg/dL    Anion Gap 8.0 0.0 - 11.9   URINALYSIS CULTURE, IF INDICATED   Result Value Ref Range    Color Yellow     Character Clear     Specific Gravity 1.015 <1.035    Ph 6.0 5.0 - 8.0    Glucose 250 (A) Negative mg/dL    Ketones Negative Negative mg/dL    Protein 30 (A) Negative mg/dL    Bilirubin Negative Negative    Nitrite Negative Negative    Leukocyte Esterase Trace (A) Negative    Occult Blood Trace (A) Negative    Micro Urine Req Microscopic     Culture Indicated Yes UA Culture   URINE MICROSCOPIC (W/UA)   Result Value Ref Range    WBC 10-20 (A) /hpf    RBC 0-2 /hpf    Bacteria Few (A) None /hpf    Epithelial Cells Few Few /hpf    Mucous Threads Few /hpf    Urine Casts 3-5 Hyaline (A) /lpf   ESTIMATED GFR   Result Value Ref Range    GFR If African American >60 >60 mL/min/1.73 m 2    GFR If Non African American 58 (A) >60 mL/min/1.73 m 2       COURSE & MEDICAL DECISION MAKING  Pertinent Labs & Imaging studies reviewed. (See chart for details)    The patient presents today with lower abdominal pain and question of postmenopausal bleeding. She is not followed up routinely with a gynecologist. An ultrasound is obtained to evaluate for possible pelvic etiology. The endometrial stripe thickness is not enlarged. No apparent masses. Laboratory studies are remarkable for normal white blood cell count. Normal hemoglobin. Urinalysis is consistent with urinary tract infection.    The patient will be treated with antibiotics for a urinary tract infection. Initial dose of IV  ceftriaxone is provided in the emergency department. She is tolerating orals. I feel that she is a good outpatient treatment candidate. She'll be given a perception for Omnicef. She'll also be treated with Pyridium for pain control. She plans to follow-up with gynecology. We do not have gynecology services on call at this facility today and therefore the patient will follow up with her primary care provider and discuss gynecology referral. I stressed the importance of this and the patient verbalizes understanding.    FINAL IMPRESSION  1. Acute cystitis without hematuria    2. Post-menopausal bleeding    3. Type 1 diabetes mellitus with ketoacidosis without coma (CMS-[facility])            Electronically signed by: Chevy Enciso, 11/5/2017 8:59 AM

## 2017-11-06 ENCOUNTER — PATIENT OUTREACH (OUTPATIENT)
Dept: HEALTH INFORMATION MANAGEMENT | Facility: OTHER | Age: 52
End: 2017-11-06

## 2017-11-06 NOTE — LETTER
Yuki Riddle  [address]  [place] [address]    November 6, 2017      Dear Yuki Riddle,    [facility] wants to ensure your discharge home is safe and you or your loved ones have had all of your questions answered regarding your care after you leave the hospital.    Our discharge team was unsuccessful in our attempts to contact you telephonically and we wanted to be sure that you had a list of resources and contact information should you have any questions regarding your hospital stay, follow-up instructions, or active medical symptoms.    Questions or Concerns Regarding… Contact   Medical Questions Related to Your Discharge  (7 days a week, 8am-5pm) Contact a Nurse Care Coordinator   [phone]   Medical Questions Not Related to Your Discharge  (24 hours a day / 7 days a week)  Contact the Nurse Health Line   [phone]    Medications or Discharge Instructions Refer to your discharge packet   or contact your -[phone]   Follow-up Appointment(s) Schedule your appointment via [employer]   or contact Scheduling [phone]   Billing Review your statement via [employer]  or contact Billing [phone]   Medical Records Review your records via [employer]   or contact Medical Records [phone]     You can also easily access your medical information, test results and upcoming appointments via the [employer] free online health management tool. You can learn more and sign up at [employer]cher. For assistance setting up your [employer] account, please call [phone].    Once again, we want to ensure your discharge home is safe and that you have a clear understanding of any next steps in your care. If you have any questions or concerns, please do not hesitate to contact us, we are here for you. Thank you for choosing [facility] for your healthcare needs.    Sincerely,      Your [facility] Healthcare Team

## 2017-11-06 NOTE — PROGRESS NOTES
Placed discharge outreach phone call to patient s/p ER discharge 11/5/17.  Received recording stating that pt's voice mailbox is full and unable to accept any further messages.  Discharge outreach letter mailed to patient.

## 2017-11-07 LAB
BACTERIA UR CULT: NORMAL
SIGNIFICANT IND 70042: NORMAL
SITE SITE: NORMAL
SOURCE SOURCE: NORMAL

## 2017-11-09 ENCOUNTER — APPOINTMENT (OUTPATIENT)
Dept: PULMONOLOGY | Facility: HOSPICE | Age: 52
End: 2017-11-09
Payer: MEDICAID

## 2017-11-26 ENCOUNTER — RESOLUTE PROFESSIONAL BILLING HOSPITAL PROF FEE (OUTPATIENT)
Dept: HOSPITALIST | Facility: MEDICAL CENTER | Age: 52
End: 2017-11-26
Payer: MEDICAID

## 2017-11-26 ENCOUNTER — APPOINTMENT (OUTPATIENT)
Dept: RADIOLOGY | Facility: MEDICAL CENTER | Age: 52
DRG: 392 | End: 2017-11-26
Attending: EMERGENCY MEDICINE
Payer: MEDICAID

## 2017-11-26 ENCOUNTER — HOSPITAL ENCOUNTER (INPATIENT)
Facility: MEDICAL CENTER | Age: 52
LOS: 3 days | DRG: 392 | End: 2017-11-29
Attending: EMERGENCY MEDICINE | Admitting: HOSPITALIST
Payer: MEDICAID

## 2017-11-26 DIAGNOSIS — N12 PYELONEPHRITIS: ICD-10-CM

## 2017-11-26 LAB
ALBUMIN SERPL BCP-MCNC: 3.9 G/DL (ref 3.2–4.9)
ALBUMIN/GLOB SERPL: 1.6 G/DL
ALP SERPL-CCNC: 97 U/L (ref 30–99)
ALT SERPL-CCNC: 23 U/L (ref 2–50)
ANION GAP SERPL CALC-SCNC: 9 MMOL/L (ref 0–11.9)
APPEARANCE UR: ABNORMAL
AST SERPL-CCNC: 18 U/L (ref 12–45)
BACTERIA #/AREA URNS HPF: ABNORMAL /HPF
BASOPHILS # BLD AUTO: 1.3 % (ref 0–1.8)
BASOPHILS # BLD: 0.08 K/UL (ref 0–0.12)
BILIRUB SERPL-MCNC: 1 MG/DL (ref 0.1–1.5)
BILIRUB UR QL STRIP.AUTO: NEGATIVE
BUN SERPL-MCNC: 13 MG/DL (ref 8–22)
CALCIUM SERPL-MCNC: 8.8 MG/DL (ref 8.4–10.2)
CASTS URNS QL MICRO: ABNORMAL /LPF
CHLORIDE SERPL-SCNC: 102 MMOL/L (ref 96–112)
CO2 SERPL-SCNC: 25 MMOL/L (ref 20–33)
COLOR UR: YELLOW
CREAT SERPL-MCNC: 0.91 MG/DL (ref 0.5–1.4)
EOSINOPHIL # BLD AUTO: 0.03 K/UL (ref 0–0.51)
EOSINOPHIL NFR BLD: 0.5 % (ref 0–6.9)
EPI CELLS #/AREA URNS HPF: ABNORMAL /HPF
ERYTHROCYTE [DISTWIDTH] IN BLOOD BY AUTOMATED COUNT: 42.5 FL (ref 35.9–50)
GFR SERPL CREATININE-BSD FRML MDRD: >60 ML/MIN/1.73 M 2
GLOBULIN SER CALC-MCNC: 2.4 G/DL (ref 1.9–3.5)
GLUCOSE BLD-MCNC: 267 MG/DL (ref 65–99)
GLUCOSE SERPL-MCNC: 319 MG/DL (ref 65–99)
GLUCOSE UR STRIP.AUTO-MCNC: 250 MG/DL
HCT VFR BLD AUTO: 38 % (ref 37–47)
HGB BLD-MCNC: 13.3 G/DL (ref 12–16)
IMM GRANULOCYTES # BLD AUTO: 0.02 K/UL (ref 0–0.11)
IMM GRANULOCYTES NFR BLD AUTO: 0.3 % (ref 0–0.9)
KETONES UR STRIP.AUTO-MCNC: 15 MG/DL
LACTATE BLD-SCNC: 1.14 MMOL/L (ref 0.5–2)
LACTATE BLD-SCNC: 1.65 MMOL/L (ref 0.5–2)
LEUKOCYTE ESTERASE UR QL STRIP.AUTO: ABNORMAL
LYMPHOCYTES # BLD AUTO: 1.14 K/UL (ref 1–4.8)
LYMPHOCYTES NFR BLD: 19.1 % (ref 22–41)
MCH RBC QN AUTO: 32.1 PG (ref 27–33)
MCHC RBC AUTO-ENTMCNC: 35 G/DL (ref 33.6–35)
MCV RBC AUTO: 91.8 FL (ref 81.4–97.8)
MICRO URNS: ABNORMAL
MONOCYTES # BLD AUTO: 0.25 K/UL (ref 0–0.85)
MONOCYTES NFR BLD AUTO: 4.2 % (ref 0–13.4)
MUCOUS THREADS #/AREA URNS HPF: ABNORMAL /HPF
NEUTROPHILS # BLD AUTO: 4.44 K/UL (ref 2–7.15)
NEUTROPHILS NFR BLD: 74.6 % (ref 44–72)
NITRITE UR QL STRIP.AUTO: POSITIVE
NRBC # BLD AUTO: 0 K/UL
NRBC BLD AUTO-RTO: 0 /100 WBC
PH UR STRIP.AUTO: 5.5 [PH]
PLATELET # BLD AUTO: 196 K/UL (ref 164–446)
PMV BLD AUTO: 11.8 FL (ref 9–12.9)
POTASSIUM SERPL-SCNC: 3.9 MMOL/L (ref 3.6–5.5)
PROT SERPL-MCNC: 6.3 G/DL (ref 6–8.2)
PROT UR QL STRIP: 100 MG/DL
RBC # BLD AUTO: 4.14 M/UL (ref 4.2–5.4)
RBC # URNS HPF: ABNORMAL /HPF
RBC UR QL AUTO: ABNORMAL
SODIUM SERPL-SCNC: 136 MMOL/L (ref 135–145)
SP GR UR REFRACTOMETRY: 1.02
SPECIMEN DRAWN FROM PATIENT: NORMAL
SPECIMEN DRAWN FROM PATIENT: NORMAL
UNIDENT CRYS URNS QL MICRO: ABNORMAL /HPF
WBC # BLD AUTO: 6 K/UL (ref 4.8–10.8)
WBC #/AREA URNS HPF: ABNORMAL /HPF

## 2017-11-26 PROCEDURE — 700105 HCHG RX REV CODE 258: Performed by: EMERGENCY MEDICINE

## 2017-11-26 PROCEDURE — 700105 HCHG RX REV CODE 258: Performed by: HOSPITALIST

## 2017-11-26 PROCEDURE — 83036 HEMOGLOBIN GLYCOSYLATED A1C: CPT

## 2017-11-26 PROCEDURE — 83605 ASSAY OF LACTIC ACID: CPT

## 2017-11-26 PROCEDURE — 96376 TX/PRO/DX INJ SAME DRUG ADON: CPT

## 2017-11-26 PROCEDURE — 99223 1ST HOSP IP/OBS HIGH 75: CPT | Performed by: HOSPITALIST

## 2017-11-26 PROCEDURE — 82962 GLUCOSE BLOOD TEST: CPT

## 2017-11-26 PROCEDURE — 3E0234Z INTRODUCTION OF SERUM, TOXOID AND VACCINE INTO MUSCLE, PERCUTANEOUS APPROACH: ICD-10-PCS | Performed by: HOSPITALIST

## 2017-11-26 PROCEDURE — 304561 HCHG STAT O2

## 2017-11-26 PROCEDURE — A9270 NON-COVERED ITEM OR SERVICE: HCPCS | Performed by: EMERGENCY MEDICINE

## 2017-11-26 PROCEDURE — 304562 HCHG STAT O2 MASK/CANNULA

## 2017-11-26 PROCEDURE — 700102 HCHG RX REV CODE 250 W/ 637 OVERRIDE(OP): Performed by: EMERGENCY MEDICINE

## 2017-11-26 PROCEDURE — 90686 IIV4 VACC NO PRSV 0.5 ML IM: CPT | Performed by: HOSPITALIST

## 2017-11-26 PROCEDURE — 94760 N-INVAS EAR/PLS OXIMETRY 1: CPT

## 2017-11-26 PROCEDURE — 85025 COMPLETE CBC W/AUTO DIFF WBC: CPT

## 2017-11-26 PROCEDURE — 71010 DX-CHEST-PORTABLE (1 VIEW): CPT

## 2017-11-26 PROCEDURE — 700111 HCHG RX REV CODE 636 W/ 250 OVERRIDE (IP): Performed by: EMERGENCY MEDICINE

## 2017-11-26 PROCEDURE — 36415 COLL VENOUS BLD VENIPUNCTURE: CPT

## 2017-11-26 PROCEDURE — 81001 URINALYSIS AUTO W/SCOPE: CPT

## 2017-11-26 PROCEDURE — 80053 COMPREHEN METABOLIC PANEL: CPT

## 2017-11-26 PROCEDURE — A9270 NON-COVERED ITEM OR SERVICE: HCPCS | Performed by: HOSPITALIST

## 2017-11-26 PROCEDURE — 96374 THER/PROPH/DIAG INJ IV PUSH: CPT

## 2017-11-26 PROCEDURE — 87086 URINE CULTURE/COLONY COUNT: CPT | Mod: 91

## 2017-11-26 PROCEDURE — 87040 BLOOD CULTURE FOR BACTERIA: CPT

## 2017-11-26 PROCEDURE — 90471 IMMUNIZATION ADMIN: CPT

## 2017-11-26 PROCEDURE — 700111 HCHG RX REV CODE 636 W/ 250 OVERRIDE (IP): Performed by: HOSPITALIST

## 2017-11-26 PROCEDURE — 700102 HCHG RX REV CODE 250 W/ 637 OVERRIDE(OP): Performed by: HOSPITALIST

## 2017-11-26 PROCEDURE — 99285 EMERGENCY DEPT VISIT HI MDM: CPT

## 2017-11-26 PROCEDURE — 700102 HCHG RX REV CODE 250 W/ 637 OVERRIDE(OP)

## 2017-11-26 PROCEDURE — 770006 HCHG ROOM/CARE - MED/SURG/GYN SEMI*

## 2017-11-26 PROCEDURE — 74176 CT ABD & PELVIS W/O CONTRAST: CPT

## 2017-11-26 RX ORDER — HYDROCODONE BITARTRATE AND ACETAMINOPHEN 10; 325 MG/1; MG/1
1 TABLET ORAL ONCE
Status: COMPLETED | OUTPATIENT
Start: 2017-11-26 | End: 2017-11-26

## 2017-11-26 RX ORDER — ONDANSETRON 4 MG/1
4 TABLET, ORALLY DISINTEGRATING ORAL EVERY 4 HOURS PRN
Status: DISCONTINUED | OUTPATIENT
Start: 2017-11-26 | End: 2017-11-29 | Stop reason: HOSPADM

## 2017-11-26 RX ORDER — GABAPENTIN 300 MG/1
600 CAPSULE ORAL 3 TIMES DAILY PRN
Status: DISCONTINUED | OUTPATIENT
Start: 2017-11-26 | End: 2017-11-27

## 2017-11-26 RX ORDER — CARISOPRODOL 350 MG/1
350 TABLET ORAL EVERY 8 HOURS PRN
Status: DISCONTINUED | OUTPATIENT
Start: 2017-11-26 | End: 2017-11-27

## 2017-11-26 RX ORDER — SODIUM CHLORIDE 9 MG/ML
INJECTION, SOLUTION INTRAVENOUS ONCE
Status: COMPLETED | OUTPATIENT
Start: 2017-11-26 | End: 2017-11-26

## 2017-11-26 RX ORDER — HYDROCODONE BITARTRATE AND ACETAMINOPHEN 10; 325 MG/1; MG/1
1-2 TABLET ORAL EVERY 6 HOURS PRN
Status: DISCONTINUED | OUTPATIENT
Start: 2017-11-26 | End: 2017-11-27

## 2017-11-26 RX ORDER — SODIUM CHLORIDE 9 MG/ML
INJECTION, SOLUTION INTRAVENOUS CONTINUOUS
Status: DISCONTINUED | OUTPATIENT
Start: 2017-11-26 | End: 2017-11-28

## 2017-11-26 RX ORDER — INSULIN GLARGINE 100 [IU]/ML
23 INJECTION, SOLUTION SUBCUTANEOUS EVERY EVENING
Status: DISCONTINUED | OUTPATIENT
Start: 2017-11-26 | End: 2017-11-29

## 2017-11-26 RX ORDER — MORPHINE SULFATE 4 MG/ML
4 INJECTION, SOLUTION INTRAMUSCULAR; INTRAVENOUS
Status: DISCONTINUED | OUTPATIENT
Start: 2017-11-26 | End: 2017-11-27

## 2017-11-26 RX ORDER — ONDANSETRON 2 MG/ML
4 INJECTION INTRAMUSCULAR; INTRAVENOUS ONCE
Status: COMPLETED | OUTPATIENT
Start: 2017-11-26 | End: 2017-11-26

## 2017-11-26 RX ORDER — PROMETHAZINE HYDROCHLORIDE 25 MG/1
12.5-25 TABLET ORAL EVERY 4 HOURS PRN
Status: DISCONTINUED | OUTPATIENT
Start: 2017-11-26 | End: 2017-11-29 | Stop reason: HOSPADM

## 2017-11-26 RX ORDER — ACETAMINOPHEN 325 MG/1
650 TABLET ORAL EVERY 6 HOURS PRN
Status: DISCONTINUED | OUTPATIENT
Start: 2017-11-26 | End: 2017-11-29 | Stop reason: HOSPADM

## 2017-11-26 RX ORDER — BISACODYL 10 MG
10 SUPPOSITORY, RECTAL RECTAL
Status: DISCONTINUED | OUTPATIENT
Start: 2017-11-26 | End: 2017-11-29 | Stop reason: HOSPADM

## 2017-11-26 RX ORDER — POLYETHYLENE GLYCOL 3350 17 G/17G
1 POWDER, FOR SOLUTION ORAL
Status: DISCONTINUED | OUTPATIENT
Start: 2017-11-26 | End: 2017-11-29 | Stop reason: HOSPADM

## 2017-11-26 RX ORDER — CYCLOBENZAPRINE HCL 10 MG
10 TABLET ORAL 2 TIMES DAILY PRN
Status: DISCONTINUED | OUTPATIENT
Start: 2017-11-26 | End: 2017-11-27

## 2017-11-26 RX ORDER — IBUPROFEN 200 MG
400 TABLET ORAL EVERY 6 HOURS PRN
Status: DISCONTINUED | OUTPATIENT
Start: 2017-11-26 | End: 2017-11-29 | Stop reason: HOSPADM

## 2017-11-26 RX ORDER — AMOXICILLIN 250 MG
2 CAPSULE ORAL 2 TIMES DAILY
Status: DISCONTINUED | OUTPATIENT
Start: 2017-11-26 | End: 2017-11-29 | Stop reason: HOSPADM

## 2017-11-26 RX ORDER — PHENAZOPYRIDINE HYDROCHLORIDE 200 MG/1
200 TABLET, FILM COATED ORAL 3 TIMES DAILY PRN
Status: DISCONTINUED | OUTPATIENT
Start: 2017-11-26 | End: 2017-11-29 | Stop reason: HOSPADM

## 2017-11-26 RX ORDER — CIPROFLOXACIN 2 MG/ML
400 INJECTION, SOLUTION INTRAVENOUS EVERY 12 HOURS
Status: DISCONTINUED | OUTPATIENT
Start: 2017-11-26 | End: 2017-11-27

## 2017-11-26 RX ORDER — DEXTROSE MONOHYDRATE 25 G/50ML
25 INJECTION, SOLUTION INTRAVENOUS
Status: DISCONTINUED | OUTPATIENT
Start: 2017-11-26 | End: 2017-11-29 | Stop reason: HOSPADM

## 2017-11-26 RX ORDER — ONDANSETRON 2 MG/ML
4 INJECTION INTRAMUSCULAR; INTRAVENOUS EVERY 4 HOURS PRN
Status: DISCONTINUED | OUTPATIENT
Start: 2017-11-26 | End: 2017-11-29 | Stop reason: HOSPADM

## 2017-11-26 RX ORDER — PROMETHAZINE HYDROCHLORIDE 25 MG/1
12.5-25 SUPPOSITORY RECTAL EVERY 4 HOURS PRN
Status: DISCONTINUED | OUTPATIENT
Start: 2017-11-26 | End: 2017-11-29 | Stop reason: HOSPADM

## 2017-11-26 RX ORDER — DIAZEPAM 5 MG/1
10 TABLET ORAL EVERY 6 HOURS PRN
Status: DISCONTINUED | OUTPATIENT
Start: 2017-11-26 | End: 2017-11-29 | Stop reason: HOSPADM

## 2017-11-26 RX ADMIN — SODIUM CHLORIDE: 9 INJECTION, SOLUTION INTRAVENOUS at 22:01

## 2017-11-26 RX ADMIN — CYCLOBENZAPRINE HYDROCHLORIDE 10 MG: 10 TABLET, FILM COATED ORAL at 21:58

## 2017-11-26 RX ADMIN — ONDANSETRON 4 MG: 2 INJECTION, SOLUTION INTRAMUSCULAR; INTRAVENOUS at 19:14

## 2017-11-26 RX ADMIN — INFLUENZA A VIRUS A/MICHIGAN/45/2015 X-275 (H1N1) ANTIGEN (FORMALDEHYDE INACTIVATED), INFLUENZA A VIRUS A/HONG KONG/4801/2014 X-263B (H3N2) ANTIGEN (FORMALDEHYDE INACTIVATED), INFLUENZA B VIRUS B/PHUKET/3073/2013 ANTIGEN (FORMALDEHYDE INACTIVATED), AND INFLUENZA B VIRUS B/BRISBANE/60/2008 ANTIGEN (FORMALDEHYDE INACTIVATED) 0.5 ML: 15; 15; 15; 15 INJECTION, SUSPENSION INTRAMUSCULAR at 21:59

## 2017-11-26 RX ADMIN — INSULIN GLARGINE 23 UNITS: 100 INJECTION, SOLUTION SUBCUTANEOUS at 22:30

## 2017-11-26 RX ADMIN — GABAPENTIN 600 MG: 300 CAPSULE ORAL at 21:58

## 2017-11-26 RX ADMIN — SODIUM CHLORIDE: 9 INJECTION, SOLUTION INTRAVENOUS at 17:41

## 2017-11-26 RX ADMIN — ONDANSETRON 4 MG: 2 INJECTION, SOLUTION INTRAMUSCULAR; INTRAVENOUS at 17:41

## 2017-11-26 RX ADMIN — HYDROCODONE BITARTRATE AND ACETAMINOPHEN 2 TABLET: 10; 325 TABLET ORAL at 21:58

## 2017-11-26 RX ADMIN — CARISOPRODOL 350 MG: 350 TABLET ORAL at 21:58

## 2017-11-26 RX ADMIN — HYDROCODONE BITARTRATE AND ACETAMINOPHEN 1 TABLET: 10; 325 TABLET ORAL at 19:40

## 2017-11-26 RX ADMIN — CIPROFLOXACIN 400 MG: 2 INJECTION, SOLUTION INTRAVENOUS at 21:59

## 2017-11-26 ASSESSMENT — PAIN SCALES - GENERAL
PAINLEVEL_OUTOF10: 7
PAINLEVEL_OUTOF10: 7
PAINLEVEL_OUTOF10: 4

## 2017-11-26 ASSESSMENT — COPD QUESTIONNAIRES
DO YOU EVER COUGH UP ANY MUCUS OR PHLEGM?: YES, A FEW DAYS A WEEK OR MONTH
HAVE YOU SMOKED AT LEAST 100 CIGARETTES IN YOUR ENTIRE LIFE: YES
DURING THE PAST 4 WEEKS HOW MUCH DID YOU FEEL SHORT OF BREATH: MOST  OR ALL OF THE TIME
COPD SCREENING SCORE: 7

## 2017-11-26 ASSESSMENT — LIFESTYLE VARIABLES
EVER_SMOKED: YES
DO YOU DRINK ALCOHOL: NO
EVER_SMOKED: YES

## 2017-11-27 PROBLEM — F13.20 ANXIOLYTIC DEPENDENCE (HCC): Status: ACTIVE | Noted: 2017-11-27

## 2017-11-27 PROBLEM — R10.2 PELVIC PAIN: Status: ACTIVE | Noted: 2017-11-26

## 2017-11-27 PROBLEM — E10.65 UNCONTROLLED TYPE 1 DIABETES MELLITUS WITH HYPERGLYCEMIA (HCC): Status: ACTIVE | Noted: 2017-11-27

## 2017-11-27 LAB
ANION GAP SERPL CALC-SCNC: 4 MMOL/L (ref 0–11.9)
BUN SERPL-MCNC: 12 MG/DL (ref 8–22)
CALCIUM SERPL-MCNC: 7.8 MG/DL (ref 8.4–10.2)
CHLORIDE SERPL-SCNC: 107 MMOL/L (ref 96–112)
CO2 SERPL-SCNC: 29 MMOL/L (ref 20–33)
CREAT SERPL-MCNC: 0.9 MG/DL (ref 0.5–1.4)
EST. AVERAGE GLUCOSE BLD GHB EST-MCNC: 186 MG/DL
GFR SERPL CREATININE-BSD FRML MDRD: >60 ML/MIN/1.73 M 2
GLUCOSE BLD-MCNC: 135 MG/DL (ref 65–99)
GLUCOSE BLD-MCNC: 173 MG/DL (ref 65–99)
GLUCOSE BLD-MCNC: 174 MG/DL (ref 65–99)
GLUCOSE BLD-MCNC: 192 MG/DL (ref 65–99)
GLUCOSE BLD-MCNC: 227 MG/DL (ref 65–99)
GLUCOSE BLD-MCNC: 74 MG/DL (ref 65–99)
GLUCOSE SERPL-MCNC: 164 MG/DL (ref 65–99)
HBA1C MFR BLD: 8.1 % (ref 0–5.6)
POTASSIUM SERPL-SCNC: 4 MMOL/L (ref 3.6–5.5)
SODIUM SERPL-SCNC: 140 MMOL/L (ref 135–145)

## 2017-11-27 PROCEDURE — 770006 HCHG ROOM/CARE - MED/SURG/GYN SEMI*

## 2017-11-27 PROCEDURE — 700102 HCHG RX REV CODE 250 W/ 637 OVERRIDE(OP): Performed by: INTERNAL MEDICINE

## 2017-11-27 PROCEDURE — 700102 HCHG RX REV CODE 250 W/ 637 OVERRIDE(OP): Performed by: HOSPITALIST

## 2017-11-27 PROCEDURE — 700111 HCHG RX REV CODE 636 W/ 250 OVERRIDE (IP): Performed by: HOSPITALIST

## 2017-11-27 PROCEDURE — 82962 GLUCOSE BLOOD TEST: CPT | Mod: 91

## 2017-11-27 PROCEDURE — A9270 NON-COVERED ITEM OR SERVICE: HCPCS | Performed by: HOSPITALIST

## 2017-11-27 PROCEDURE — 99406 BEHAV CHNG SMOKING 3-10 MIN: CPT

## 2017-11-27 PROCEDURE — 99232 SBSQ HOSP IP/OBS MODERATE 35: CPT | Performed by: INTERNAL MEDICINE

## 2017-11-27 PROCEDURE — 36415 COLL VENOUS BLD VENIPUNCTURE: CPT

## 2017-11-27 PROCEDURE — 80048 BASIC METABOLIC PNL TOTAL CA: CPT

## 2017-11-27 PROCEDURE — 700105 HCHG RX REV CODE 258: Performed by: HOSPITALIST

## 2017-11-27 PROCEDURE — A9270 NON-COVERED ITEM OR SERVICE: HCPCS | Performed by: INTERNAL MEDICINE

## 2017-11-27 RX ORDER — CARISOPRODOL 350 MG/1
350 TABLET ORAL EVERY 6 HOURS
Status: DISCONTINUED | OUTPATIENT
Start: 2017-11-27 | End: 2017-11-29 | Stop reason: HOSPADM

## 2017-11-27 RX ORDER — CEFDINIR 300 MG/1
300 CAPSULE ORAL 2 TIMES DAILY
Status: ON HOLD | COMMUNITY
Start: 2017-11-05 | End: 2017-11-29

## 2017-11-27 RX ORDER — HYDROCODONE BITARTRATE AND ACETAMINOPHEN 5; 325 MG/1; MG/1
TABLET ORAL
Status: DISCONTINUED
Start: 2017-11-27 | End: 2017-11-27

## 2017-11-27 RX ORDER — CIPROFLOXACIN 500 MG/1
500 TABLET, FILM COATED ORAL EVERY 12 HOURS
Status: DISCONTINUED | OUTPATIENT
Start: 2017-11-27 | End: 2017-11-29 | Stop reason: HOSPADM

## 2017-11-27 RX ORDER — HYDROCODONE BITARTRATE AND ACETAMINOPHEN 10; 325 MG/1; MG/1
TABLET ORAL
Status: COMPLETED
Start: 2017-11-27 | End: 2017-11-27

## 2017-11-27 RX ORDER — GABAPENTIN 300 MG/1
600 CAPSULE ORAL 2 TIMES DAILY
Status: DISCONTINUED | OUTPATIENT
Start: 2017-11-27 | End: 2017-11-29 | Stop reason: HOSPADM

## 2017-11-27 RX ORDER — HYDROCODONE BITARTRATE AND ACETAMINOPHEN 10; 325 MG/1; MG/1
1-2 TABLET ORAL EVERY 4 HOURS PRN
Status: DISCONTINUED | OUTPATIENT
Start: 2017-11-27 | End: 2017-11-29 | Stop reason: HOSPADM

## 2017-11-27 RX ORDER — NICOTINE 21 MG/24HR
1 PATCH, TRANSDERMAL 24 HOURS TRANSDERMAL
Status: DISCONTINUED | OUTPATIENT
Start: 2017-11-27 | End: 2017-11-29 | Stop reason: HOSPADM

## 2017-11-27 RX ADMIN — SODIUM CHLORIDE: 9 INJECTION, SOLUTION INTRAVENOUS at 21:31

## 2017-11-27 RX ADMIN — DOCUSATE SODIUM AND SENNOSIDES 2 TABLET: 8.6; 5 TABLET, FILM COATED ORAL at 08:26

## 2017-11-27 RX ADMIN — INSULIN GLARGINE 23 UNITS: 100 INJECTION, SOLUTION SUBCUTANEOUS at 21:23

## 2017-11-27 RX ADMIN — CIPROFLOXACIN 500 MG: 500 TABLET, FILM COATED ORAL at 21:17

## 2017-11-27 RX ADMIN — INSULIN HUMAN 2 UNITS: 100 INJECTION, SOLUTION PARENTERAL at 08:30

## 2017-11-27 RX ADMIN — HYDROCODONE BITARTRATE AND ACETAMINOPHEN 2 TABLET: 10; 325 TABLET ORAL at 12:04

## 2017-11-27 RX ADMIN — GABAPENTIN 600 MG: 300 CAPSULE ORAL at 11:18

## 2017-11-27 RX ADMIN — HYDROCODONE BITARTRATE AND ACETAMINOPHEN 2 TABLET: 10; 325 TABLET ORAL at 06:04

## 2017-11-27 RX ADMIN — NICOTINE 14 MG: 14 PATCH, EXTENDED RELEASE TRANSDERMAL at 12:02

## 2017-11-27 RX ADMIN — GABAPENTIN 600 MG: 300 CAPSULE ORAL at 21:17

## 2017-11-27 RX ADMIN — HYDROCODONE BITARTRATE AND ACETAMINOPHEN 2 TABLET: 10; 325 TABLET ORAL at 17:30

## 2017-11-27 RX ADMIN — CARISOPRODOL 350 MG: 350 TABLET ORAL at 11:23

## 2017-11-27 RX ADMIN — HYDROCODONE BITARTRATE AND ACETAMINOPHEN 2 TABLET: 10; 325 TABLET ORAL at 21:17

## 2017-11-27 RX ADMIN — INSULIN HUMAN 2 UNITS: 100 INJECTION, SOLUTION PARENTERAL at 21:22

## 2017-11-27 RX ADMIN — CIPROFLOXACIN 400 MG: 2 INJECTION, SOLUTION INTRAVENOUS at 08:27

## 2017-11-27 RX ADMIN — ENOXAPARIN SODIUM 40 MG: 100 INJECTION SUBCUTANEOUS at 08:25

## 2017-11-27 RX ADMIN — CARISOPRODOL 350 MG: 350 TABLET ORAL at 17:08

## 2017-11-27 RX ADMIN — DOCUSATE SODIUM AND SENNOSIDES 2 TABLET: 8.6; 5 TABLET, FILM COATED ORAL at 21:17

## 2017-11-27 RX ADMIN — SODIUM CHLORIDE: 9 INJECTION, SOLUTION INTRAVENOUS at 15:20

## 2017-11-27 ASSESSMENT — ENCOUNTER SYMPTOMS
COUGH: 0
WEAKNESS: 1
DIARRHEA: 0
NERVOUS/ANXIOUS: 1
SHORTNESS OF BREATH: 0
ROS GI COMMENTS: PELVIC PAIN
BACK PAIN: 1
NAUSEA: 0
ABDOMINAL PAIN: 1
VOMITING: 0
INSOMNIA: 1
CONSTIPATION: 0

## 2017-11-27 ASSESSMENT — PAIN SCALES - GENERAL
PAINLEVEL_OUTOF10: 6
PAINLEVEL_OUTOF10: 0
PAINLEVEL_OUTOF10: 5
PAINLEVEL_OUTOF10: 0

## 2017-11-27 NOTE — PROGRESS NOTES
Patient arrived from ED , alert and oriented, able to ambulate to bed, complains of pain on her leg, VSS, Discussed POC, patient in agreement. Call light within reach, will continue to monitor.

## 2017-11-27 NOTE — RESPIRATORY CARE
"COPD EDUCATION by COPD CLINICAL EDUCATOR  11/27/2017 at 2:05 PM by Shanti Bautista     Patient interviewed by COPD education team. Patient refused COPD program at this time. A comprehensive short packet including information about COPD, treatments, and smoking cessation given.     Smoking Cessation Intervention 3 -10 minutes completed.  Provided smoking cessation packet with \"Tips to Quit\" and flyer for \"Free Smoking Cessation Classes\". Provided \"Quit Card\" with the phone number to [facility] for free nicotine replacement therapy. Provided coupon for [employer].              "

## 2017-11-27 NOTE — DISCHARGE PLANNING
Care Transition Team Assessment    IHD met with patient bedside. She stated she lives with her daughter and 4 year old granddaughter. Her son and daughter drive her for appointments. She use a cane or wheelchair for longer outings, and has a concentrator at home that she does not use regularly. She does not have HHC and does not expect to discharge with any new services.     Information Source  Orientation : Oriented x 4  Information Given By: Patient  Informant's Name: Yuki  Who is responsible for making decisions for patient? : Patient    Readmission Evaluation  Is this a readmission?: No    Elopement Risk  Legal Hold: No    Interdisciplinary Discharge Planning  Does Admitting Nurse Feel This Could be a Complex Discharge?: No  Primary Care Physician: Dr. Dwayne Quesada  (Endocrinologist)  Lives with - Patient's Self Care Capacity: Adult Children  Patient or legal guardian wants to designate a caregiver (see row info): No  Support Systems: Family Member(s), Friends / Neighbors  Housing / Facility: 1 Story Apartment / Condo  Do You Take your Prescribed Medications Regularly: Yes  Able to Return to Previous ADL's: Yes  Mobility Issues: No  Prior Services: None  Patient Expects to be Discharged to:: home  Assistance Needed: No  Durable Medical Equipment: Other - Specify, Home Oxygen (cane, wheelchair, has concentratro at home, doesn't use)    Discharge Preparedness  What is your plan after discharge?: Home with help  What are your discharge supports?: Child  Prior Functional Level: Ambulatory, Independent with Activities of Daily Living, Independent with Medication Management, Uses Cane, Uses Wheelchair  Difficulity with ADLs: Walking  Difficulty with ADLs Comment: Uses cane or wheelchair for out of house trips  Difficulity with IADLs: Driving  Difficulity with IADL Comments: Son and daughter drive    Functional Assesment  Prior Functional Level: Ambulatory, Independent with Activities of Daily Living,  Independent with Medication Management, Uses Cane, Uses Wheelchair    Finances  Financial Barriers to Discharge: No  Prescription Coverage: Yes ([employer] on [address])    Vision / Hearing Impairment  Vision Impairment : Yes  Right Eye Vision: Wears Glasses, Impaired  Left Eye Vision: Wears Glasses, Impaired  Hearing Impairment : No    Values / Beliefs / Concerns  Values / Beliefs Concerns : No    Advance Directive  Advance Directive?: None    Domestic Abuse  Have you ever been the victim of abuse or violence?: No  Physical Abuse or Sexual Abuse: No  Verbal Abuse or Emotional Abuse: No  Possible Abuse Reported to:: Not Applicable    Psychological Assessment  History of Substance Abuse: None  History of Psychiatric Problems: No  Non-compliant with Treatment: No  Newly Diagnosed Illness: No    Discharge Risks or Barriers  Discharge risks or barriers?: No    Anticipated Discharge Information  Anticipated discharge disposition: Discharge needs currently unknown  Discharge Address: [address] [facility], [place] [address]  Discharge Contact Phone Number: [phone]

## 2017-11-27 NOTE — PROGRESS NOTES
Bedside report completed. Assumed pt care. Pt A&O 4, resting in bed comfortably with no signs of labored breathing on 2L O2 (pt does not wear O2 at home). Pt is medical. Pt call light within reach, bed in low position, upper bed rails up, non skid socks in place. Pt denies any pain or other distress at this time. Patient was updated on the plan of care for the day. All fall precautions in place. Will continue to monitor.

## 2017-11-27 NOTE — CARE PLAN
Problem: Pain Management  Goal: Pain level will decrease to patient's comfort goal  Outcome: PROGRESSING AS EXPECTED  Medicated patient per MAR      Problem: Safety  Goal: Will remain free from injury  Outcome: PROGRESSING AS EXPECTED

## 2017-11-27 NOTE — ASSESSMENT & PLAN NOTE
With dysuria and postmenopausal bleeding-needs GYN follow-up  11/28:  Vaginal exam with minimal findings.  HCG pending, wet mount unrevealing, GC/chlamydia pending  Likely represents UTI.

## 2017-11-27 NOTE — PROGRESS NOTES
[facility] Hospitalist Progress Note    Date of Service: 2017    Chief Complaint  52 y.o. female with past medical history of type I diabetes poorly controlled with hyperglycemia, chronic pain on chronic hydrocodone. Here on  with similar complaints. Her urine culture was negative but for some reason she was treated with Omnicef post discharge. She was having postmenopausal bleeding at that time, pelvic sonogram showed an endometrial stripe of 7 mm. She was to follow-up with GYN but had difficulty getting an appointment, as her insurance is not generally accepted by the specialist.  CT scan in the emergency room did not show any pyelonephritis, she has no fever, no leukocytosis, her urinalysis findings are similar to that of  when her culture was negative.    Interval Problem Update  Discussed plan of care, I doubt she has a UTI. She does need to follow up with gynecology however. We'll de-escalate to oral Cipro. If her culture is negative she can go home tomorrow. We are trying to coordinate follow-up with primary care so that they can get her to gynecology.    Consultants/Specialty  None    Disposition  home        Review of Systems   Constitutional: Positive for malaise/fatigue.   Respiratory: Negative for cough and shortness of breath.    Cardiovascular: Negative for chest pain.        No pressure or tightness   Gastrointestinal: Positive for abdominal pain. Negative for constipation, diarrhea, nausea and vomiting.        Pelvic pain   Genitourinary: Positive for dysuria and urgency.        Vaginal bleeding   Musculoskeletal: Positive for back pain (spasms and cramping).   Neurological: Positive for weakness.   Psychiatric/Behavioral: The patient is nervous/anxious and has insomnia.       Physical Exam  Laboratory/Imaging   Hemodynamics  Temp (24hrs), Av.7 °C (98 °F), Min:36.3 °C (97.3 °F), Max:36.8 °C (98.3 °F)   Temperature: 36.8 °C (98.2 °F)  Pulse  Av  Min: 73  Max: 118    Blood Pressure:  108/41, NIBP: 154/77      Respiratory      Respiration: 18, Pulse Oximetry: 93 %, O2 Daily Delivery Respiratory : Silicone Nasal Cannula     Work Of Breathing / Effort: Mild  RUL Breath Sounds: Clear, RML Breath Sounds: Clear, RLL Breath Sounds: Diminished, PARVEEN Breath Sounds: Clear, LLL Breath Sounds: Diminished    Fluids    Intake/Output Summary (Last 24 hours) at 11/27/17 1325  Last data filed at 11/27/17 0800   Gross per 24 hour   Intake              820 ml   Output                0 ml   Net              820 ml       Nutrition  Orders Placed This Encounter   Procedures   • Diet Order     Standing Status:   Standing     Number of Occurrences:   1     Order Specific Question:   Diet:     Answer:   Diabetic [3]     Physical Exam   Constitutional: She is oriented to person, place, and time. She appears well-developed and well-nourished. No distress.   HENT:   Head: Normocephalic and atraumatic.   Eyes: EOM are normal. Pupils are equal, round, and reactive to light. Right eye exhibits no discharge. Left eye exhibits no discharge.   Neck: Neck supple.   Cardiovascular: Normal rate and regular rhythm.    Pulmonary/Chest: Effort normal and breath sounds normal.   Abdominal: Soft. She exhibits no distension. There is tenderness (suprapubic). There is no rebound and no guarding.   Diminished bowel sounds   Musculoskeletal: She exhibits no edema or tenderness.   Neurological: She is oriented to person, place, and time. No cranial nerve deficit.   A little groggy   Skin: Skin is warm and dry. She is not diaphoretic.   Psychiatric: Her mood appears anxious. She exhibits a depressed mood.   Nursing note and vitals reviewed.      Recent Labs      11/26/17 1746   WBC  6.0   RBC  4.14*   HEMOGLOBIN  13.3   HEMATOCRIT  38.0   MCV  91.8   MCH  32.1   MCHC  35.0   RDW  42.5   PLATELETCT  196   MPV  11.8     Recent Labs      11/26/17   1746  11/27/17   0747   SODIUM  136  140   POTASSIUM  3.9  4.0   CHLORIDE  102  107   CO2  25  29    GLUCOSE  319*  164*   BUN  13  12   CREATININE  0.91  0.90   CALCIUM  8.8  7.8*                      Assessment/Plan     Anxiolytic dependence (CMS-HCC)- (present on admission)   Assessment & Plan    Continue chronic Valium        Uncontrolled type 1 diabetes mellitus with hyperglycemia (CMS-HCC)- (present on admission)   Assessment & Plan    Continue on insulin and diet        Pelvic pain- (present on admission)   Assessment & Plan    With dysuria and postmenopausal bleeding-needs GYN follow-up        Opiate dependence (CMS-HCC)- (present on admission)   Assessment & Plan    Avoid escalation  Continue on by mouth meds        Tobacco dependence- (present on admission)   Assessment & Plan    Replacement protocol            Reviewed items::  Labs reviewed and Medications reviewed  Bates catheter::  No Bates  DVT prophylaxis pharmacological::  Enoxaparin (Lovenox)  Antibiotics:  Treating active infection/contamination beyond 24 hours perioperative coverage

## 2017-11-27 NOTE — ED NOTES
Iv placed , labs bld cx x 1  drawn and taken to lab. poc update given to pt, results pending at this time

## 2017-11-27 NOTE — ED NOTES
Treated for UTI w/ PO ABX approx 2 weeks ago.  Return of sx (frequency, dysuria, flank/groin pain) 2 days ago.  Meets sepsis criteria, protocol initiated.  Diabetic.

## 2017-11-27 NOTE — CARE PLAN
Problem: Safety  Goal: Will remain free from injury    Intervention: Provide assistance with mobility  Pt mobility assessed at beginning of shift, pt is up self and steady.  Fall precautions in place, non-slip socks on, bed in lowest, locked position and call light is within reach.  Pt educated to call for assistance and verbalizes understanding.       Problem: Knowledge Deficit  Goal: Knowledge of disease process/condition, treatment plan, diagnostic tests, and medications will improve    Intervention: Assess knowledge level of disease process/condition, treatment plan, diagnostic tests, and medications  Educated pt on disease process, treatment plan and reason for medications she is on.  Pt also educated on how to deep breath and cough efficiently.

## 2017-11-27 NOTE — ED PROVIDER NOTES
ED Provider Note    CHIEF COMPLAINT  Chief Complaint   Patient presents with   • Chills   • Flank Pain   • Groin Pain       [facility]  Yuki Riddle is a 52 y.o. female who presentsFor evaluation of dysuria flank pain and groin pain. Patient is insulin dependent diabetic with multiple medical problems which are reviewed below. She states in the past 24 hours she has had pain that is radiating from the groin consistent with previous urine tract infections. She was treated 2 weeks ago for UTI. She was treated with cefdinir. Today she's been nauseated pain is mostly localized to the left side. She is very nauseated. She states she is a brittle diabetic. She is followed by endocrinologist Dr. Quesada. Her past medical history is reviewed and is extensive as noted below. She denies any cough, chest pain    REVIEW OF SYSTEMS  See HPI for further details. All other systems reviewed and negative except as noted above    PAST MEDICAL HISTORY  Past Medical History:   Diagnosis Date   • Arthritis    • Asthma     no inhalers   • Bowel habit changes     diarrhea   • Breath shortness    • Bronchitis 12/2016   • Cataract    • Cold 12/3/2016   • Dental disorder     upper and lower dentures   • Diabetes     juvenile, type I   • Diabetic retinopathy    • Fibromyalgia    • Heart burn    • Hypertension    • Indigestion    • Migraine    • Neuropathy (CMS-[facility])    • Pneumonia 1993   • Psychiatric problem    • Rheumatoid arthritis (CMS-[facility])    • Sleep apnea     cpap ordered, doesn't use   • TIA (transient ischemic attack)    • Urinary bladder disorder    • Urinary incontinence        FAMILY HISTORY  Family History   Problem Relation Age of Onset   • No Known Problems Sister    • No Known Problems Brother    • No Known Problems Brother    • No Known Problems Daughter    • No Known Problems Daughter        SOCIAL HISTORY  Social History     Social History   • Marital status:      Spouse name: N/A   • Number of children: N/A  "  • Years of education: N/A     Social History Main Topics   • Smoking status: Current Every Day Smoker     Packs/day: 0.50     Years: 29.00     Types: Cigarettes   • Smokeless tobacco: Never Used      Comment: 1/2 PPD   • Alcohol use No   • Drug use: No   • Sexual activity: Not on file     Other Topics Concern   • Not on file     Social History Narrative   • No narrative on file       SURGICAL HISTORY  Past Surgical History:   Procedure Laterality Date   • GASTROSCOPY WITH BALLOON DILATATION  2/13/2015    Performed by Venancio Aburto M.D. at SURGERY [facility]   • GASTROSCOPY WITH BIOPSY  2/13/2015    Performed by Venancio Aburto M.D. at SURGERY [facility]S   • FOREIGN BODY REMOVAL  6/18/2013    Performed by Raz Mari M.D. at SURGERY SAME DAY [facility] ORS   • CHOLECYSTECTOMY     • OTHER      Skin disorder of unknown name   • PRIMARY C SECTION     • TONSILLECTOMY         CURRENT MEDICATIONS  Home Medications    **Home medications have not yet been reviewed for this encounter**          ALLERGIES  Allergies   Allergen Reactions   • Compazine      Aggressive behavior    • Sulfa Drugs Itching     Blotchy spots on chest       PHYSICAL EXAM  VITAL SIGNS: /97   Pulse (!) 118   Temp 36.8 °C (98.3 °F)   Resp 16   Ht 1.651 m (5' 5\")   Wt 69.8 kg (153 lb 14.1 oz)   LMP 01/01/2002   SpO2 97%   BMI 25.61 kg/m²   Constitutional :  Well developed, Well nourished, Looks uncomfortable Appears somewhat ill  HENT: Head is atraumatic normocephalic somewhat dry mucous membranes  Eyes: Normal-appearing nonicteric  Neck: Normal range of motion, No tenderness, Supple, No stridor.   Lymphatic: No cervical adenopathy.   Cardiovascular: Tachycardia noted Normal rhythm, No murmurs, No rubs, No gallops.   Thorax & Lungs: Equal breath sounds are noted bilaterally no rales rhonchi  Skin: Warm, Dry, No erythema, No rash.   Abdomen soft nontender no distention left flank tenderness slightly  Extremities " no cyanosis or edema  Neurological the patient is awake alert without any focal neurological findings  Psychiatric appropriate mood and affect    CT-RENAL COLIC EVALUATION(A/P W/O)   Final Result         1. No urinary tract calculus identified. No renal collecting system in dilatation.      2. No evidence of inflammatory change in the abdomen or pelvis.  The study is however limited due to nonuse of intravenous contrast      DX-CHEST-PORTABLE (1 VIEW)   Final Result         1. No acute cardiopulmonary abnormalities are identified.        Results for orders placed or performed during the hospital encounter of 11/26/17   Lactic acid (lactate)   Result Value Ref Range    Lactic Acid 1.65 0.50 - 2.00 mmol/L    Specimen Venous    Lactic acid (lactate)   Result Value Ref Range    Lactic Acid 1.14 0.50 - 2.00 mmol/L    Specimen Venous    CBC WITH DIFFERENTIAL   Result Value Ref Range    WBC 6.0 4.8 - 10.8 K/uL    RBC 4.14 (L) 4.20 - 5.40 M/uL    Hemoglobin 13.3 12.0 - 16.0 g/dL    Hematocrit 38.0 37.0 - 47.0 %    MCV 91.8 81.4 - 97.8 fL    MCH 32.1 27.0 - 33.0 pg    MCHC 35.0 33.6 - 35.0 g/dL    RDW 42.5 35.9 - 50.0 fL    Platelet Count 196 164 - 446 K/uL    MPV 11.8 9.0 - 12.9 fL    Neutrophils-Polys 74.60 (H) 44.00 - 72.00 %    Lymphocytes 19.10 (L) 22.00 - 41.00 %    Monocytes 4.20 0.00 - 13.40 %    Eosinophils 0.50 0.00 - 6.90 %    Basophils 1.30 0.00 - 1.80 %    Immature Granulocytes 0.30 0.00 - 0.90 %    Nucleated RBC 0.00 /100 WBC    Neutrophils (Absolute) 4.44 2.00 - 7.15 K/uL    Lymphs (Absolute) 1.14 1.00 - 4.80 K/uL    Monos (Absolute) 0.25 0.00 - 0.85 K/uL    Eos (Absolute) 0.03 0.00 - 0.51 K/uL    Baso (Absolute) 0.08 0.00 - 0.12 K/uL    Immature Granulocytes (abs) 0.02 0.00 - 0.11 K/uL    NRBC (Absolute) 0.00 K/uL   COMP METABOLIC PANEL   Result Value Ref Range    Sodium 136 135 - 145 mmol/L    Potassium 3.9 3.6 - 5.5 mmol/L    Chloride 102 96 - 112 mmol/L    Co2 25 20 - 33 mmol/L    Anion Gap 9.0 0.0 - 11.9     Glucose 319 (H) 65 - 99 mg/dL    Bun 13 8 - 22 mg/dL    Creatinine 0.91 0.50 - 1.40 mg/dL    Calcium 8.8 8.4 - 10.2 mg/dL    AST(SGOT) 18 12 - 45 U/L    ALT(SGPT) 23 2 - 50 U/L    Alkaline Phosphatase 97 30 - 99 U/L    Total Bilirubin 1.0 0.1 - 1.5 mg/dL    Albumin 3.9 3.2 - 4.9 g/dL    Total Protein 6.3 6.0 - 8.2 g/dL    Globulin 2.4 1.9 - 3.5 g/dL    A-G Ratio 1.6 g/dL   URINALYSIS   Result Value Ref Range    Micro Urine Req Microscopic     Color Yellow     Character Hazy (A)     Ph 5.5 5.0 - 8.0    Glucose 250 (A) Negative mg/dL    Ketones 15 (A) Negative mg/dL    Protein 100 (A) Negative mg/dL    Bilirubin Negative Negative    Nitrite Positive (A) Negative    Leukocyte Esterase Small (A) Negative    Occult Blood Large (A) Negative   ESTIMATED GFR   Result Value Ref Range    GFR If African American >60 >60 mL/min/1.73 m 2    GFR If Non African American >60 >60 mL/min/1.73 m 2   REFRACTOMETER SG   Result Value Ref Range    Specific Gravity 1.018    URINE MICROSCOPIC (W/UA)   Result Value Ref Range    WBC 10-20 (A) /hpf    RBC 2-5 (A) /hpf    Bacteria Few (A) None /hpf    Epithelial Cells Few Few /hpf    Mucous Threads Moderate /hpf    Urine Crystals Rare Amorphous /hpf    Urine Casts 0-2 Hyaline /lpf   ACCU-CHEK GLUCOSE   Result Value Ref Range    Glucose - Accu-Ck 267 (H) 65 - 99 mg/dL     COURSE & MEDICAL DECISION MAKING  Pertinent Labs & Imaging studies reviewed. (See chart for details)  The patient presenting with a history of severe pain to her left flank area and history of recent treated urinary tract infection. She is very nauseated and has been treated with Zofran ×2 doses because of the persistent nausea after the 1st Zofran dose. Because of her severe pain CT imaging was done to rule out possible ureterolithiasis and this is a negative study. She has evidence of continued infection with 10-20 white cells per high-powered field. She is given a liter of saline for evidence of clinical dehydration. The  patient is not stable for discharge she is insulin dependent diabetic I suspect she has underlying pyelonephritis given pain and nausea. I discussed the findings and the hospitalist will be admitting. She does not have elevation of her lactate     FINAL IMPRESSION  1. Pyelonephritis acute  2.   3.      Electronically signed by: Aki West, 11/26/2017

## 2017-11-28 PROBLEM — R82.90 ABNORMAL FINDING ON URINALYSIS: Status: ACTIVE | Noted: 2017-11-28

## 2017-11-28 LAB
BACTERIA GENITAL QL WET PREP: NORMAL
GLUCOSE BLD-MCNC: 241 MG/DL (ref 65–99)
GLUCOSE BLD-MCNC: 82 MG/DL (ref 65–99)
GLUCOSE BLD-MCNC: 83 MG/DL (ref 65–99)
GLUCOSE BLD-MCNC: 89 MG/DL (ref 65–99)
HCG UR QL: NEGATIVE
SIGNIFICANT IND 70042: NORMAL
SITE SITE: NORMAL
SOURCE SOURCE: NORMAL
SP GR UR REFRACTOMETRY: 1

## 2017-11-28 PROCEDURE — 700102 HCHG RX REV CODE 250 W/ 637 OVERRIDE(OP): Performed by: HOSPITALIST

## 2017-11-28 PROCEDURE — 99232 SBSQ HOSP IP/OBS MODERATE 35: CPT | Performed by: HOSPITALIST

## 2017-11-28 PROCEDURE — 87591 N.GONORRHOEAE DNA AMP PROB: CPT

## 2017-11-28 PROCEDURE — 82962 GLUCOSE BLOOD TEST: CPT

## 2017-11-28 PROCEDURE — 700102 HCHG RX REV CODE 250 W/ 637 OVERRIDE(OP): Performed by: INTERNAL MEDICINE

## 2017-11-28 PROCEDURE — 700105 HCHG RX REV CODE 258: Performed by: HOSPITALIST

## 2017-11-28 PROCEDURE — A9270 NON-COVERED ITEM OR SERVICE: HCPCS | Performed by: INTERNAL MEDICINE

## 2017-11-28 PROCEDURE — 770006 HCHG ROOM/CARE - MED/SURG/GYN SEMI*

## 2017-11-28 PROCEDURE — A9270 NON-COVERED ITEM OR SERVICE: HCPCS | Performed by: HOSPITALIST

## 2017-11-28 PROCEDURE — 87491 CHLMYD TRACH DNA AMP PROBE: CPT

## 2017-11-28 PROCEDURE — 81025 URINE PREGNANCY TEST: CPT

## 2017-11-28 RX ADMIN — CIPROFLOXACIN 500 MG: 500 TABLET, FILM COATED ORAL at 08:36

## 2017-11-28 RX ADMIN — SODIUM CHLORIDE: 9 INJECTION, SOLUTION INTRAVENOUS at 05:59

## 2017-11-28 RX ADMIN — DOCUSATE SODIUM AND SENNOSIDES 2 TABLET: 8.6; 5 TABLET, FILM COATED ORAL at 20:19

## 2017-11-28 RX ADMIN — NICOTINE 14 MG: 14 PATCH, EXTENDED RELEASE TRANSDERMAL at 05:56

## 2017-11-28 RX ADMIN — DOCUSATE SODIUM AND SENNOSIDES 2 TABLET: 8.6; 5 TABLET, FILM COATED ORAL at 08:36

## 2017-11-28 RX ADMIN — INSULIN GLARGINE 23 UNITS: 100 INJECTION, SOLUTION SUBCUTANEOUS at 22:37

## 2017-11-28 RX ADMIN — GABAPENTIN 600 MG: 300 CAPSULE ORAL at 08:36

## 2017-11-28 RX ADMIN — POLYETHYLENE GLYCOL 3350 1 PACKET: 17 POWDER, FOR SOLUTION ORAL at 22:32

## 2017-11-28 RX ADMIN — HYDROCODONE BITARTRATE AND ACETAMINOPHEN 2 TABLET: 10; 325 TABLET ORAL at 18:03

## 2017-11-28 RX ADMIN — HYDROCODONE BITARTRATE AND ACETAMINOPHEN 2 TABLET: 10; 325 TABLET ORAL at 22:31

## 2017-11-28 RX ADMIN — MAGNESIUM HYDROXIDE 30 ML: 400 SUSPENSION ORAL at 18:05

## 2017-11-28 RX ADMIN — CARISOPRODOL 350 MG: 350 TABLET ORAL at 12:30

## 2017-11-28 RX ADMIN — HYDROCODONE BITARTRATE AND ACETAMINOPHEN 2 TABLET: 10; 325 TABLET ORAL at 12:34

## 2017-11-28 RX ADMIN — CARISOPRODOL 350 MG: 350 TABLET ORAL at 17:38

## 2017-11-28 RX ADMIN — GABAPENTIN 600 MG: 300 CAPSULE ORAL at 20:19

## 2017-11-28 RX ADMIN — CARISOPRODOL 350 MG: 350 TABLET ORAL at 00:15

## 2017-11-28 RX ADMIN — HYDROCODONE BITARTRATE AND ACETAMINOPHEN 2 TABLET: 10; 325 TABLET ORAL at 06:11

## 2017-11-28 RX ADMIN — INSULIN HUMAN 3 UNITS: 100 INJECTION, SOLUTION PARENTERAL at 22:36

## 2017-11-28 RX ADMIN — CIPROFLOXACIN 500 MG: 500 TABLET, FILM COATED ORAL at 20:19

## 2017-11-28 RX ADMIN — CARISOPRODOL 350 MG: 350 TABLET ORAL at 05:57

## 2017-11-28 ASSESSMENT — PAIN SCALES - GENERAL
PAINLEVEL_OUTOF10: 2
PAINLEVEL_OUTOF10: 0
PAINLEVEL_OUTOF10: 0
PAINLEVEL_OUTOF10: 5
PAINLEVEL_OUTOF10: 0

## 2017-11-28 ASSESSMENT — ENCOUNTER SYMPTOMS
NAUSEA: 0
NERVOUS/ANXIOUS: 0
WEAKNESS: 1
COUGH: 0
INSOMNIA: 0
CONSTIPATION: 0
SHORTNESS OF BREATH: 0
ROS GI COMMENTS: PELVIC PAIN
ABDOMINAL PAIN: 0
DIARRHEA: 0
VOMITING: 0
BACK PAIN: 1

## 2017-11-28 NOTE — PROGRESS NOTES
Received report resting in bed sleepy but rouses easily when name called,states feeling better denies pain on her abdomen,voiding with out difficulty.Discussed POC and verbalized understanding.Instructed to call for any needs call light with in reach.

## 2017-11-28 NOTE — CARE PLAN
Problem: Infection  Goal: Will remain free from infection    Intervention: Assess signs and symptoms of infection  Continue to monitor for s/s of infection  On oral antibiotics,encourage fluid intake  awaiting for cultures      Problem: Knowledge Deficit  Goal: Knowledge of disease process/condition, treatment plan, diagnostic tests, and medications will improve  Outcome: PROGRESSING AS EXPECTED  Continue to update on POC,test and procedures  Pelvic exam by MD due to complaints of discharge and pain  Specs sent to lab.

## 2017-11-28 NOTE — PROGRESS NOTES
[facility] Hospitalist Progress Note    Date of Service: 11/28/2017    Chief Complaint  52 y.o. female with past medical history of type I diabetes poorly controlled with hyperglycemia, chronic pain on chronic hydrocodone. Here on 11/5 with similar complaints. Her urine culture was negative but for some reason she was treated with Omnicef post discharge. She was having postmenopausal bleeding at that time, pelvic sonogram showed an endometrial stripe of 7 mm. She was to follow-up with GYN but had difficulty getting an appointment, as her insurance is not generally accepted by the specialist.  CT scan in the emergency room did not show any pyelonephritis, she has no fever, no leukocytosis, her urinalysis findings are similar to that of 11 5 when her culture was negative.    Interval Problem Update  11/27:  Discussed plan of care, I doubt she has a UTI. She does need to follow up with gynecology however. We'll de-escalate to oral Cipro. If her culture is negative she can go home tomorrow. We are trying to coordinate follow-up with primary care so that they can get her to gynecology.  11/28:  Reports vaginal discharge and lower left sided pelvic pain.  Reviewed prior vaginal u/s with increased endometrial stripe, no GYN follow up yet.  Pelvic exam with minimal vaginal d/c, no CMT, no masses, normal appearance of cervix.  Wet prep and GC/chlamydia ordered.  UC pending.  Serum pregnancy pending.  Has been through menopause 30s, but reports occasional vaginal bleeding with intercourse.    Consultants/Specialty  None    Disposition  Home when medically cleared.        Review of Systems   Constitutional: Negative for malaise/fatigue.   Respiratory: Negative for cough and shortness of breath.    Cardiovascular: Negative for chest pain.        No pressure or tightness   Gastrointestinal: Negative for abdominal pain, constipation, diarrhea, nausea and vomiting.        Pelvic pain   Genitourinary: Positive for dysuria and urgency.         Vaginal bleeding with intercourse and vaginal discharge   Musculoskeletal: Positive for back pain (spasms and cramping).   Neurological: Positive for weakness.   Psychiatric/Behavioral: The patient is not nervous/anxious and does not have insomnia.       Physical Exam  Laboratory/Imaging   Hemodynamics  Temp (24hrs), Av.4 °C (97.6 °F), Min:36.3 °C (97.3 °F), Max:36.6 °C (97.9 °F)   Temperature: 36.3 °C (97.3 °F)  Pulse  Av.2  Min: 73  Max: 118    Blood Pressure: 154/77      Respiratory      Respiration: 18, Pulse Oximetry: 97 %        RUL Breath Sounds: Clear, RML Breath Sounds: Clear, RLL Breath Sounds: Clear, PARVEEN Breath Sounds: Clear, LLL Breath Sounds: Clear    Fluids    Intake/Output Summary (Last 24 hours) at 17 1513  Last data filed at 17 0500   Gross per 24 hour   Intake             3090 ml   Output                0 ml   Net             3090 ml       Nutrition  Orders Placed This Encounter   Procedures   • Diet Order     Standing Status:   Standing     Number of Occurrences:   1     Order Specific Question:   Diet:     Answer:   Diabetic [3]     Physical Exam   Constitutional: She is oriented to person, place, and time. She appears well-developed and well-nourished. No distress.   HENT:   Head: Normocephalic and atraumatic.   Eyes: EOM are normal. Pupils are equal, round, and reactive to light. Right eye exhibits no discharge. Left eye exhibits no discharge.   Neck: Neck supple.   Cardiovascular: Normal rate and regular rhythm.    Pulmonary/Chest: Effort normal and breath sounds normal.   Abdominal: Soft. She exhibits no distension. There is tenderness (left suprapubic area radiating to left side of back). There is no rebound and no guarding.   Genitourinary: Uterus normal. Vaginal discharge found.   Genitourinary Comments: Scant amount of paracervical cloudy dc seen, normal appearance to cervix, no CMT, no masses appreciated, no bleeding noted.   Musculoskeletal: She exhibits no  edema or tenderness.   Neurological: She is alert and oriented to person, place, and time. No cranial nerve deficit.   Skin: Skin is warm and dry. She is not diaphoretic.   Psychiatric: Her behavior is normal. Judgment and thought content normal. Her mood appears not anxious. She does not exhibit a depressed mood.   Nursing note and vitals reviewed.      Recent Labs      11/26/17   1746   WBC  6.0   RBC  4.14*   HEMOGLOBIN  13.3   HEMATOCRIT  38.0   MCV  91.8   MCH  32.1   MCHC  35.0   RDW  42.5   PLATELETCT  196   MPV  11.8     Recent Labs      11/26/17   1746  11/27/17   0747   SODIUM  136  140   POTASSIUM  3.9  4.0   CHLORIDE  102  107   CO2  25  29   GLUCOSE  319*  164*   BUN  13  12   CREATININE  0.91  0.90   CALCIUM  8.8  7.8*                      Assessment/Plan     * Pelvic pain- (present on admission)   Assessment & Plan    With dysuria and postmenopausal bleeding-needs GYN follow-up  11/28:  Vaginal exam with minimal findings.  HCG pending, wet mount unrevealing, GC/chlamydia pending  Likely represents UTI.        Abnormal finding on urinalysis   Assessment & Plan    Treating for UTI left sided pain with left flank pain  On Cipro   UC pending  11/26 BCs negative x2.  Dc /hr.        Anxiolytic dependence (CMS-HCC)- (present on admission)   Assessment & Plan    Continue chronic Valium        Uncontrolled type 1 diabetes mellitus with hyperglycemia (CMS-HCC)- (present on admission)   Assessment & Plan    Continue on insulin and diet  Improved HG a1c 12 to 8%        Opiate dependence (CMS-HCC)- (present on admission)   Assessment & Plan    Avoid escalation  Continue on by mouth meds        Tobacco dependence- (present on admission)   Assessment & Plan    Replacement protocol            Reviewed items::  Labs reviewed and Medications reviewed  Bates catheter::  No Bates  DVT prophylaxis pharmacological::  Enoxaparin (Lovenox)  Antibiotics:  Treating active infection/contamination beyond 24 hours  perioperative coverage

## 2017-11-29 VITALS
OXYGEN SATURATION: 94 % | TEMPERATURE: 98.6 F | WEIGHT: 153.88 LBS | SYSTOLIC BLOOD PRESSURE: 136 MMHG | BODY MASS INDEX: 25.64 KG/M2 | HEIGHT: 65 IN | RESPIRATION RATE: 18 BRPM | DIASTOLIC BLOOD PRESSURE: 71 MMHG | HEART RATE: 85 BPM

## 2017-11-29 PROBLEM — N95.0 POSTMENOPAUSAL BLEEDING: Status: ACTIVE | Noted: 2017-11-29

## 2017-11-29 PROBLEM — R82.90 ABNORMAL FINDING ON URINALYSIS: Status: RESOLVED | Noted: 2017-11-28 | Resolved: 2017-11-29

## 2017-11-29 LAB
BACTERIA UR CULT: NORMAL
C TRACH DNA SPEC QL NAA+PROBE: NEGATIVE
GLUCOSE BLD-MCNC: 115 MG/DL (ref 65–99)
GLUCOSE BLD-MCNC: 57 MG/DL (ref 65–99)
GLUCOSE BLD-MCNC: 90 MG/DL (ref 65–99)
HCG SERPL QL: NEGATIVE
N GONORRHOEA DNA SPEC QL NAA+PROBE: NEGATIVE
SIGNIFICANT IND 70042: NORMAL
SITE SITE: NORMAL
SOURCE SOURCE: NORMAL
SPECIMEN SOURCE: NORMAL

## 2017-11-29 PROCEDURE — A9270 NON-COVERED ITEM OR SERVICE: HCPCS | Performed by: INTERNAL MEDICINE

## 2017-11-29 PROCEDURE — 82962 GLUCOSE BLOOD TEST: CPT | Mod: 91

## 2017-11-29 PROCEDURE — 36415 COLL VENOUS BLD VENIPUNCTURE: CPT

## 2017-11-29 PROCEDURE — A9270 NON-COVERED ITEM OR SERVICE: HCPCS | Performed by: HOSPITALIST

## 2017-11-29 PROCEDURE — 700102 HCHG RX REV CODE 250 W/ 637 OVERRIDE(OP): Performed by: HOSPITALIST

## 2017-11-29 PROCEDURE — 700102 HCHG RX REV CODE 250 W/ 637 OVERRIDE(OP): Performed by: INTERNAL MEDICINE

## 2017-11-29 PROCEDURE — 700111 HCHG RX REV CODE 636 W/ 250 OVERRIDE (IP): Performed by: HOSPITALIST

## 2017-11-29 PROCEDURE — 84703 CHORIONIC GONADOTROPIN ASSAY: CPT

## 2017-11-29 PROCEDURE — 99239 HOSP IP/OBS DSCHRG MGMT >30: CPT | Performed by: HOSPITALIST

## 2017-11-29 RX ORDER — INSULIN GLARGINE 100 [IU]/ML
20 INJECTION, SOLUTION SUBCUTANEOUS EVERY EVENING
Status: DISCONTINUED | OUTPATIENT
Start: 2017-11-29 | End: 2017-11-29 | Stop reason: HOSPADM

## 2017-11-29 RX ORDER — NICOTINE 21 MG/24HR
1 PATCH, TRANSDERMAL 24 HOURS TRANSDERMAL EVERY 24 HOURS
Qty: 30 PATCH | Refills: 3 | Status: SHIPPED | OUTPATIENT
Start: 2017-11-29 | End: 2018-05-23

## 2017-11-29 RX ORDER — NICOTINE 21 MG/24HR
1 PATCH, TRANSDERMAL 24 HOURS TRANSDERMAL EVERY 24 HOURS
Qty: 30 PATCH | Refills: 3 | Status: SHIPPED | OUTPATIENT
Start: 2017-11-29 | End: 2017-11-29

## 2017-11-29 RX ORDER — IBUPROFEN 400 MG/1
400 TABLET ORAL EVERY 6 HOURS PRN
Qty: 30 TAB | Refills: 0 | Status: SHIPPED | OUTPATIENT
Start: 2017-11-29 | End: 2018-05-23

## 2017-11-29 RX ADMIN — NICOTINE 14 MG: 14 PATCH, EXTENDED RELEASE TRANSDERMAL at 05:14

## 2017-11-29 RX ADMIN — HYDROCODONE BITARTRATE AND ACETAMINOPHEN 2 TABLET: 10; 325 TABLET ORAL at 11:07

## 2017-11-29 RX ADMIN — CARISOPRODOL 350 MG: 350 TABLET ORAL at 00:46

## 2017-11-29 RX ADMIN — HYDROCODONE BITARTRATE AND ACETAMINOPHEN 2 TABLET: 10; 325 TABLET ORAL at 05:13

## 2017-11-29 RX ADMIN — CIPROFLOXACIN 500 MG: 500 TABLET, FILM COATED ORAL at 08:10

## 2017-11-29 RX ADMIN — DIAZEPAM 10 MG: 5 TABLET ORAL at 02:57

## 2017-11-29 RX ADMIN — DOCUSATE SODIUM AND SENNOSIDES 2 TABLET: 8.6; 5 TABLET, FILM COATED ORAL at 08:09

## 2017-11-29 RX ADMIN — GABAPENTIN 600 MG: 300 CAPSULE ORAL at 08:09

## 2017-11-29 RX ADMIN — ENOXAPARIN SODIUM 40 MG: 100 INJECTION SUBCUTANEOUS at 08:10

## 2017-11-29 ASSESSMENT — PAIN SCALES - GENERAL
PAINLEVEL_OUTOF10: 0
PAINLEVEL_OUTOF10: 0

## 2017-11-29 ASSESSMENT — LIFESTYLE VARIABLES: EVER_SMOKED: YES

## 2017-11-29 NOTE — DISCHARGE SUMMARY
CHIEF COMPLAINT ON ADMISSION  Chief Complaint   Patient presents with   • Chills   • Flank Pain   • Groin Pain       CODE STATUS  Full Code    HPI & HOSPITAL COURSE  This is a 52 y.o. female admitted 11/26/17 with left pelvic pain .  She was recently seen in the ER 11/5/17 for similar complaints.  Urine cultures both admissions negative.  Las admission she complained of postmenopausal bleeding and had transvaginal ultrasound with endometrial stripe of 7mm.  No other abnormality seen.  She was treated with 3 days of cipro and IVFs.  She also complained of vaginal discharge, therefore pelvic exam performed:  Scant amount of vaginal fluid seen, wet prep negative, GC/chlamydia pending, but no CMT or erythema present.  Benign exam, no evidence for pelvic infection.  Her symptoms improved by time of discharge.  She was therefore discharged to home and to continue her chronic pain medications.  She was encouraged to wean off the amount of Norco she was taking.  Her Hg a1c improved from 12 to 8.1%.  Blood cultures negative. CT abdomen/pelvis wnl.    Therefore, she is discharged in good and stable condition with close outpatient follow-up.    SPECIFIC OUTPATIENT FOLLOW-UP  Follow up with new PCP for endometrial biopsy regarding postmenopausal bleeding.    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 [facility]   12/6/2017 2:15 PM Adam Diaz M.D. [facility] None   12/14/2017 3:15 PM [facility]URODIAGNOSTIC [facility] None   1/22/2018 3:20 PM Saleem  MICHELLE Win. [facility] None     No follow-up provider specified.    MEDICATIONS ON DISCHARGE   Yuki Riddle   Home Medication Instructions EDWARD:[id number]    Printed on:11/29/17 3139   Medication Information                      cyclobenzaprine (FLEXERIL) 10 MG Tab  Take 10 mg by mouth 2 times a day as needed for Muscle Spasms.             diazepam (VALIUM) 10 MG tablet  Take 10 mg by mouth 2 times a day as needed for Anxiety.             gabapentin (NEURONTIN) 300 MG Cap  Take 600 mg by mouth 2 Times a Day.             HYDROcodone Bitartrate 50 MG Capsule Extended Release 12 hour Abuse-Deterrent  Take 1 Tab by mouth every 12 hours.             ibuprofen (MOTRIN) 400 MG Tab  Take 1 Tab by mouth every 6 hours as needed for Mild Pain.             Insulin Glargine (TOUJEO SOLOSTAR) 300 UNIT/ML Solution Pen-injector  Inject 24 Units as instructed every evening.             insulin lispro (HUMALOG) 100 UNIT/ML Solution  Inject 2-10 Units as instructed 5 Times a Day. Sliding scale             nicotine (NICODERM) 14 MG/24HR PATCH 24 HR  Apply 1 Patch to skin as directed every 24 hours.             nicotine polacrilex (NICORETTE) 2 MG Gum  Take 1 Each by mouth as needed for Smoking Cessation.             Suvorexant (BELSOMRA) 15 MG Tab  Take 1 Tab by mouth every bedtime.                 DIET  Orders Placed This Encounter   Procedures   • Diet Order     Standing Status:   Standing     Number of Occurrences:   1     Order Specific Question:   Diet:     Answer:   Diabetic [3]       ACTIVITY  As tolerated.  Weight bearing as tolerated      CONSULTATIONS  none    PROCEDURES  11/5/17 transvaginal U/S:    1.  Endometrial stripe thickness measured at 7 mm.    2.  Heterogeneous uterus. No discrete fibroid seen.    3.  Right ovary not identified. No evidence of adnexal mass.    CT abdomen/pelvis w/o contrast:     1. No urinary tract calculus identified. No renal collecting system in dilatation.    2. No evidence of inflammatory  change in the abdomen or pelvis.  The study is however limited due to nonuse of intravenous contrast   Reading Provider Reading Date   Duane Garcia M.D. Nov 26, 2017       LABORATORY  Lab Results   Component Value Date/Time    SODIUM 140 11/27/2017 07:47 AM    POTASSIUM 4.0 11/27/2017 07:47 AM    CHLORIDE 107 11/27/2017 07:47 AM    CO2 29 11/27/2017 07:47 AM    GLUCOSE 164 (H) 11/27/2017 07:47 AM    BUN 12 11/27/2017 07:47 AM    CREATININE 0.90 11/27/2017 07:47 AM    CREATININE 0.8 12/13/2008 02:40 PM        Lab Results   Component Value Date/Time    WBC 6.0 11/26/2017 05:46 PM    HEMOGLOBIN 13.3 11/26/2017 05:46 PM    HEMATOCRIT 38.0 11/26/2017 05:46 PM    PLATELETCT 196 11/26/2017 05:46 PM        Total time of the discharge process exceeds 45 minutes

## 2017-11-29 NOTE — DISCHARGE PLANNING
Patient's discharge plan continues to be to return home with her daughter when medically able. No current SS needs noted.

## 2017-11-29 NOTE — PROGRESS NOTES
Called [facility] is negative for 48 hours.  MD notified.  Patient to be discharged.  Call placed to establish PCP for patient. Appointment set for December the 6th at 1030 am.  Patient IV removed, appointment schedule given to patient.  Discharge instructions given concerning PCP appointment and pyelonephritis.  Patient walked out refused assistance.

## 2017-11-29 NOTE — DISCHARGE INSTRUCTIONS
Discharge Instructions    Discharged to home by car with relative. Discharged via walking, hospital escort: Yes.  Special equipment needed: Not Applicable    Be sure to schedule a follow-up appointment with your primary care doctor or any specialists as instructed.     Discharge Plan:   Diet Plan: Discussed  Activity Level: Discussed  Smoking Cessation Offered: Patient Refused  Confirmed Follow up Appointment: Appointment Scheduled  Confirmed Symptoms Management: Discussed  Medication Reconciliation Updated: Yes  Influenza Vaccine Indication: Patient Refuses  Influenza Vaccine Given - only chart on this line when given: Influenza Vaccine Given (See MAR)    I understand that a diet low in cholesterol, fat, and sodium is recommended for good health. Unless I have been given specific instructions below for another diet, I accept this instruction as my diet prescription.   Other diet: Diabetic diet     Special Instructions: None    · Is patient discharged on Warfarin / Coumadin?   No     · Is patient Post Blood Transfusion?  No    Depression / Suicide Risk    As you are discharged from this [facility] facility, it is important to learn how to keep safe from harming yourself.    Recognize the warning signs:  · Abrupt changes in personality, positive or negative- including increase in energy   · Giving away possessions  · Change in eating patterns- significant weight changes-  positive or negative  · Change in sleeping patterns- unable to sleep or sleeping all the time   · Unwillingness or inability to communicate  · Depression  · Unusual sadness, discouragement and loneliness  · Talk of wanting to die  · Neglect of personal appearance   · Rebelliousness- reckless behavior  · Withdrawal from people/activities they love  · Confusion- inability to concentrate     If you or a loved one observes any of these behaviors or has concerns about self-harm, here's what you can do:  · Talk about it- your feelings and reasons for  harming yourself  · Remove any means that you might use to hurt yourself (examples: pills, rope, extension cords, firearm)  · Get professional help from the community (Mental Health, Substance Abuse, psychological counseling)  · Do not be alone:Call your Safe Contact- someone whom you trust who will be there for you.  · Call your local CRISIS HOTLINE 664-4118 or 226-317-6790  · Call your local Children's Mobile Crisis Response Team [place] (876) 405-5181 or wwwBuddyTV  · Call the toll free National Suicide Prevention Hotlines   · National Suicide Prevention Lifeline 251-185-XFJV (6293)  · [employer] 800-SUICIDE (566-0226)      Pyelonephritis, Adult  Pyelonephritis is a kidney infection. In general, there are 2 main types of pyelonephritis:  · Infections that come on quickly without any warning (acute pyelonephritis).  · Infections that persist for a long period of time (chronic pyelonephritis).  CAUSES   Two main causes of pyelonephritis are:  · Bacteria traveling from the bladder to the kidney. This is a problem especially in pregnant women. The urine in the bladder can become filled with bacteria from multiple causes, including:  ¨ Inflammation of the prostate gland (prostatitis).  ¨ Sexual intercourse in females.  ¨ Bladder infection (cystitis).  · Bacteria traveling from the bloodstream to the tissue part of the kidney.  Problems that may increase your risk of getting a kidney infection include:  · Diabetes.  · Kidney stones or bladder stones.  · Cancer.  · Catheters placed in the bladder.  · Other abnormalities of the kidney or ureter.  SYMPTOMS   · Abdominal pain.  · Pain in the side or flank area.  · Fever.  · Chills.  · Upset stomach.  · Blood in the urine (dark urine).  · Frequent urination.  · Strong or persistent urge to urinate.  · Burning or stinging when urinating.  DIAGNOSIS   Your caregiver may diagnose your kidney infection based on your symptoms. A urine sample may  also be taken.  TREATMENT   In general, treatment depends on how severe the infection is.   · If the infection is mild and caught early, your caregiver may treat you with oral antibiotics and send you home.  · If the infection is more severe, the bacteria may have gotten into the bloodstream. This will require intravenous (IV) antibiotics and a hospital stay. Symptoms may include:  ¨ High fever.  ¨ Severe flank pain.  ¨ Shaking chills.  · Even after a hospital stay, your caregiver may require you to be on oral antibiotics for a period of time.  · Other treatments may be required depending upon the cause of the infection.  HOME CARE INSTRUCTIONS   · Take your antibiotics as directed. Finish them even if you start to feel better.  · Make an appointment to have your urine checked to make sure the infection is gone.  · Drink enough fluids to keep your urine clear or pale yellow.  · Take medicines for the bladder if you have urgency and frequency of urination as directed by your caregiver.  SEEK IMMEDIATE MEDICAL CARE IF:   · You have a fever or persistent symptoms for more than 2-3 days.  · You have a fever and your symptoms suddenly get worse.  · You are unable to take your antibiotics or fluids.  · You develop shaking chills.  · You experience extreme weakness or fainting.  · There is no improvement after 2 days of treatment.  MAKE SURE YOU:  · Understand these instructions.  · Will watch your condition.  · Will get help right away if you are not doing well or get worse.     This information is not intended to replace advice given to you by your health care provider. Make sure you discuss any questions you have with your health care provider.     Document Released: 12/18/2006 Document Revised: 06/18/2013 Document Reviewed: 05/23/2012  [employer] Interactive Patient Education ©2016 [employer]

## 2017-11-29 NOTE — ASSESSMENT & PLAN NOTE
Treating for UTI left sided pain with left flank pain  On Cipro   UC pending  11/26 BCs negative x2.  Dc /hr.

## 2017-12-01 LAB
BACTERIA BLD CULT: NORMAL
BACTERIA BLD CULT: NORMAL
SIGNIFICANT IND 70042: NORMAL
SIGNIFICANT IND 70042: NORMAL
SITE SITE: NORMAL
SITE SITE: NORMAL
SOURCE SOURCE: NORMAL
SOURCE SOURCE: NORMAL

## 2017-12-14 ENCOUNTER — APPOINTMENT (OUTPATIENT)
Dept: NEUROLOGY | Facility: MEDICAL CENTER | Age: 52
End: 2017-12-14
Payer: MEDICAID

## 2017-12-25 ENCOUNTER — HOSPITAL ENCOUNTER (EMERGENCY)
Facility: MEDICAL CENTER | Age: 52
End: 2017-12-26
Attending: EMERGENCY MEDICINE
Payer: MEDICAID

## 2017-12-25 ENCOUNTER — APPOINTMENT (OUTPATIENT)
Dept: RADIOLOGY | Facility: MEDICAL CENTER | Age: 52
End: 2017-12-25
Attending: EMERGENCY MEDICINE
Payer: MEDICAID

## 2017-12-25 DIAGNOSIS — K52.9 COLITIS: ICD-10-CM

## 2017-12-25 DIAGNOSIS — R10.84 GENERALIZED ABDOMINAL PAIN: ICD-10-CM

## 2017-12-25 PROCEDURE — 87491 CHLMYD TRACH DNA AMP PROBE: CPT

## 2017-12-25 PROCEDURE — 83690 ASSAY OF LIPASE: CPT

## 2017-12-25 PROCEDURE — 80053 COMPREHEN METABOLIC PANEL: CPT

## 2017-12-25 PROCEDURE — 99285 EMERGENCY DEPT VISIT HI MDM: CPT

## 2017-12-25 PROCEDURE — 85025 COMPLETE CBC W/AUTO DIFF WBC: CPT

## 2017-12-25 PROCEDURE — 87591 N.GONORRHOEAE DNA AMP PROB: CPT

## 2017-12-25 RX ORDER — ONDANSETRON 2 MG/ML
4 INJECTION INTRAMUSCULAR; INTRAVENOUS ONCE
Status: COMPLETED | OUTPATIENT
Start: 2017-12-26 | End: 2017-12-26

## 2017-12-25 RX ORDER — SODIUM CHLORIDE 9 MG/ML
1000 INJECTION, SOLUTION INTRAVENOUS ONCE
Status: COMPLETED | OUTPATIENT
Start: 2017-12-26 | End: 2017-12-26

## 2017-12-25 RX ORDER — MORPHINE SULFATE 4 MG/ML
4 INJECTION, SOLUTION INTRAMUSCULAR; INTRAVENOUS ONCE
Status: COMPLETED | OUTPATIENT
Start: 2017-12-26 | End: 2017-12-26

## 2017-12-25 ASSESSMENT — PAIN SCALES - GENERAL: PAINLEVEL_OUTOF10: 7

## 2017-12-26 VITALS
HEIGHT: 65 IN | DIASTOLIC BLOOD PRESSURE: 76 MMHG | SYSTOLIC BLOOD PRESSURE: 141 MMHG | BODY MASS INDEX: 25.23 KG/M2 | TEMPERATURE: 99.5 F | WEIGHT: 151.46 LBS | HEART RATE: 99 BPM | OXYGEN SATURATION: 99 % | RESPIRATION RATE: 18 BRPM

## 2017-12-26 LAB
ALBUMIN SERPL BCP-MCNC: 3.5 G/DL (ref 3.2–4.9)
ALBUMIN/GLOB SERPL: 1.3 G/DL
ALP SERPL-CCNC: 98 U/L (ref 30–99)
ALT SERPL-CCNC: 14 U/L (ref 2–50)
ANION GAP SERPL CALC-SCNC: 8 MMOL/L (ref 0–11.9)
APPEARANCE UR: ABNORMAL
AST SERPL-CCNC: 17 U/L (ref 12–45)
BACTERIA #/AREA URNS HPF: ABNORMAL /HPF
BACTERIA GENITAL QL WET PREP: NORMAL
BASOPHILS # BLD AUTO: 0.4 % (ref 0–1.8)
BASOPHILS # BLD: 0.04 K/UL (ref 0–0.12)
BILIRUB SERPL-MCNC: 0.3 MG/DL (ref 0.1–1.5)
BILIRUB UR QL STRIP.AUTO: NEGATIVE
BUN SERPL-MCNC: 17 MG/DL (ref 8–22)
CALCIUM SERPL-MCNC: 8.7 MG/DL (ref 8.4–10.2)
CASTS URNS QL MICRO: ABNORMAL /LPF
CHLORIDE SERPL-SCNC: 100 MMOL/L (ref 96–112)
CO2 SERPL-SCNC: 27 MMOL/L (ref 20–33)
COLOR UR: YELLOW
CREAT SERPL-MCNC: 0.94 MG/DL (ref 0.5–1.4)
CULTURE IF INDICATED INDCX: NO UA CULTURE
EOSINOPHIL # BLD AUTO: 0.13 K/UL (ref 0–0.51)
EOSINOPHIL NFR BLD: 1.2 % (ref 0–6.9)
EPI CELLS #/AREA URNS HPF: ABNORMAL /HPF
ERYTHROCYTE [DISTWIDTH] IN BLOOD BY AUTOMATED COUNT: 41.7 FL (ref 35.9–50)
GFR SERPL CREATININE-BSD FRML MDRD: >60 ML/MIN/1.73 M 2
GLOBULIN SER CALC-MCNC: 2.6 G/DL (ref 1.9–3.5)
GLUCOSE SERPL-MCNC: 249 MG/DL (ref 65–99)
GLUCOSE UR STRIP.AUTO-MCNC: NEGATIVE MG/DL
HCG UR QL: NEGATIVE
HCT VFR BLD AUTO: 33.8 % (ref 37–47)
HGB BLD-MCNC: 12.1 G/DL (ref 12–16)
IMM GRANULOCYTES # BLD AUTO: 0.04 K/UL (ref 0–0.11)
IMM GRANULOCYTES NFR BLD AUTO: 0.4 % (ref 0–0.9)
KETONES UR STRIP.AUTO-MCNC: ABNORMAL MG/DL
LEUKOCYTE ESTERASE UR QL STRIP.AUTO: NEGATIVE
LIPASE SERPL-CCNC: 13 U/L (ref 7–58)
LYMPHOCYTES # BLD AUTO: 1.39 K/UL (ref 1–4.8)
LYMPHOCYTES NFR BLD: 13 % (ref 22–41)
MCH RBC QN AUTO: 33 PG (ref 27–33)
MCHC RBC AUTO-ENTMCNC: 35.8 G/DL (ref 33.6–35)
MCV RBC AUTO: 92.1 FL (ref 81.4–97.8)
MICRO URNS: ABNORMAL
MONOCYTES # BLD AUTO: 0.63 K/UL (ref 0–0.85)
MONOCYTES NFR BLD AUTO: 5.9 % (ref 0–13.4)
MUCOUS THREADS #/AREA URNS HPF: ABNORMAL /HPF
NEUTROPHILS # BLD AUTO: 8.48 K/UL (ref 2–7.15)
NEUTROPHILS NFR BLD: 79.1 % (ref 44–72)
NITRITE UR QL STRIP.AUTO: NEGATIVE
NRBC # BLD AUTO: 0 K/UL
NRBC BLD-RTO: 0 /100 WBC
PH UR STRIP.AUTO: 6 [PH]
PLATELET # BLD AUTO: 178 K/UL (ref 164–446)
PMV BLD AUTO: 11.7 FL (ref 9–12.9)
POTASSIUM SERPL-SCNC: 3.7 MMOL/L (ref 3.6–5.5)
PROT SERPL-MCNC: 6.1 G/DL (ref 6–8.2)
PROT UR QL STRIP: 100 MG/DL
RBC # BLD AUTO: 3.67 M/UL (ref 4.2–5.4)
RBC # URNS HPF: ABNORMAL /HPF
RBC UR QL AUTO: NEGATIVE
SIGNIFICANT IND 70042: NORMAL
SITE SITE: NORMAL
SODIUM SERPL-SCNC: 135 MMOL/L (ref 135–145)
SOURCE SOURCE: NORMAL
SP GR UR REFRACTOMETRY: 1.01
SP GR UR STRIP.AUTO: 1.02
WBC # BLD AUTO: 10.7 K/UL (ref 4.8–10.8)
WBC #/AREA URNS HPF: ABNORMAL /HPF

## 2017-12-26 PROCEDURE — 700111 HCHG RX REV CODE 636 W/ 250 OVERRIDE (IP): Performed by: EMERGENCY MEDICINE

## 2017-12-26 PROCEDURE — 81025 URINE PREGNANCY TEST: CPT

## 2017-12-26 PROCEDURE — 74177 CT ABD & PELVIS W/CONTRAST: CPT

## 2017-12-26 PROCEDURE — 81001 URINALYSIS AUTO W/SCOPE: CPT

## 2017-12-26 PROCEDURE — 96375 TX/PRO/DX INJ NEW DRUG ADDON: CPT

## 2017-12-26 PROCEDURE — 700117 HCHG RX CONTRAST REV CODE 255: Performed by: EMERGENCY MEDICINE

## 2017-12-26 PROCEDURE — 36415 COLL VENOUS BLD VENIPUNCTURE: CPT

## 2017-12-26 PROCEDURE — 96374 THER/PROPH/DIAG INJ IV PUSH: CPT | Mod: XU

## 2017-12-26 PROCEDURE — 700105 HCHG RX REV CODE 258: Performed by: EMERGENCY MEDICINE

## 2017-12-26 RX ORDER — HYDROCODONE BITARTRATE AND ACETAMINOPHEN 5; 325 MG/1; MG/1
1-2 TABLET ORAL EVERY 4 HOURS PRN
Qty: 20 TAB | Refills: 0 | Status: SHIPPED | OUTPATIENT
Start: 2017-12-26 | End: 2017-12-26

## 2017-12-26 RX ORDER — METRONIDAZOLE 500 MG/1
500 TABLET ORAL 3 TIMES DAILY
Qty: 30 TAB | Refills: 0 | Status: SHIPPED | OUTPATIENT
Start: 2017-12-26 | End: 2018-01-05

## 2017-12-26 RX ORDER — CIPROFLOXACIN 500 MG/1
500 TABLET, FILM COATED ORAL 2 TIMES DAILY
Qty: 20 TAB | Refills: 0 | Status: SHIPPED | OUTPATIENT
Start: 2017-12-26 | End: 2018-01-05

## 2017-12-26 RX ORDER — ONDANSETRON 4 MG/1
4 TABLET, ORALLY DISINTEGRATING ORAL EVERY 6 HOURS PRN
Qty: 20 TAB | Refills: 0 | Status: SHIPPED | OUTPATIENT
Start: 2017-12-26 | End: 2018-03-07

## 2017-12-26 RX ADMIN — SODIUM CHLORIDE 1000 ML: 9 INJECTION, SOLUTION INTRAVENOUS at 00:08

## 2017-12-26 RX ADMIN — MORPHINE SULFATE 4 MG: 4 INJECTION INTRAVENOUS at 00:08

## 2017-12-26 RX ADMIN — ONDANSETRON 4 MG: 2 INJECTION INTRAMUSCULAR; INTRAVENOUS at 00:07

## 2017-12-26 RX ADMIN — IOHEXOL 100 ML: 350 INJECTION, SOLUTION INTRAVENOUS at 00:58

## 2017-12-26 ASSESSMENT — ENCOUNTER SYMPTOMS
SHORTNESS OF BREATH: 0
MYALGIAS: 0
CHILLS: 0
NAUSEA: 1
HEADACHES: 0
DIARRHEA: 1
VOMITING: 0
ABDOMINAL PAIN: 1
FEVER: 1

## 2017-12-26 NOTE — DISCHARGE INSTRUCTIONS
Colitis  Colitis is inflammation of the colon. Colitis can be a short-term or long-standing (chronic) illness. Crohn's disease and ulcerative colitis are 2 types of colitis which are chronic. They usually require lifelong treatment.  CAUSES   There are many different causes of colitis, including:  · Viruses.  · Germs (bacteria).  · Medicine reactions.  SYMPTOMS   · Diarrhea.  · Intestinal bleeding.  · Pain.  · Fever.  · Throwing up (vomiting).  · Tiredness (fatigue).  · Weight loss.  · Bowel blockage.  DIAGNOSIS   The diagnosis of colitis is based on examination and stool or blood tests. X-rays, CT scan, and colonoscopy may also be needed.  TREATMENT   Treatment may include:  · Fluids given through the vein (intravenously).  · Bowel rest (nothing to eat or drink for a period of time).  · Medicine for pain and diarrhea.  · Medicines (antibiotics) that kill germs.  · Cortisone medicines.  · Surgery.  HOME CARE INSTRUCTIONS   · Get plenty of rest.  · Drink enough water and fluids to keep your urine clear or pale yellow.  · Eat a well-balanced diet.  · Call your caregiver for follow-up as recommended.  SEEK IMMEDIATE MEDICAL CARE IF:   · You develop chills.  · You have an oral temperature above 102° F (38.9° C), not controlled by medicine.  · You have extreme weakness, fainting, or dehydration.  · You have repeated vomiting.  · You develop severe belly (abdominal) pain or are passing bloody or tarry stools.  MAKE SURE YOU:   · Understand these instructions.  · Will watch your condition.  · Will get help right away if you are not doing well or get worse.     This information is not intended to replace advice given to you by your health care provider. Make sure you discuss any questions you have with your health care provider.     Document Released: 01/25/2006 Document Revised: 03/11/2013 Document Reviewed: 04/11/2016  [employer] Interactive Patient Education ©2016 [employer]

## 2017-12-26 NOTE — ED PROVIDER NOTES
ED Provider Note    Means of arrival: Private vehicle  History obtained from: Patient  History limited by: None    CHIEF COMPLAINT  Chief Complaint   Patient presents with   • Abdominal Pain     lower abd pain       HPI  Yuki Riddle is a 52 y.o. female who presents to the Emergency DepartmentFor abdominal pain. Patient reports that earlier this week she had the stomach flu, but today developed worsening abdominal pain today. She reports that the abdominal pain is in the bilateral lower quadrants is cramping and moderate in severity. She reports associated nausea and nonbloody diarrhea. Patient also reports subjective fevers. She denies dysuria, vaginal bleeding, vaginal discharge. Prior abdominal surgical history is notable for cholecystectomy and .    REVIEW OF SYSTEMS  Review of Systems   Constitutional: Positive for fever (subjective). Negative for chills.   Respiratory: Negative for shortness of breath.    Cardiovascular: Negative for chest pain.   Gastrointestinal: Positive for abdominal pain, diarrhea and nausea. Negative for vomiting.   Genitourinary: Negative for dysuria.   Musculoskeletal: Negative for myalgias.   Neurological: Negative for headaches.   All other systems reviewed and are negative.    PAST MEDICAL HISTORY   has a past medical history of Arthritis; Asthma; Bowel habit changes; Breath shortness; Bronchitis (2016); Cataract; Cold (12/3/2016); Dental disorder; Diabetes; Diabetic retinopathy; Fibromyalgia; Heart burn; Hypertension; Indigestion; Migraine; Neuropathy (CMS-HCC); Pneumonia (); Psychiatric problem; Rheumatoid arthritis (CMS-HCC); Sleep apnea; TIA (transient ischemic attack); Urinary bladder disorder; and Urinary incontinence.    SURGICAL HISTORY   has a past surgical history that includes tonsillectomy; cholecystectomy; primary c section; other; foreign body removal (2013); gastroscopy with balloon dilatation (2015); and gastroscopy with biopsy  "(2/13/2015).    SOCIAL HISTORY  Social History   Substance Use Topics   • Smoking status: Current Every Day Smoker     Packs/day: 0.50     Years: 29.00     Types: Cigarettes   • Smokeless tobacco: Never Used      Comment: 1/2 PPD   • Alcohol use No      History   Drug Use No       FAMILY HISTORY  Family History   Problem Relation Age of Onset   • No Known Problems Sister    • No Known Problems Brother    • No Known Problems Brother    • No Known Problems Daughter    • No Known Problems Daughter        CURRENT MEDICATIONS  Home Medications    Hydrocodone         ALLERGIES  Allergies   Allergen Reactions   • Compazine      Aggressive behavior    • Sulfa Drugs Itching     Blotchy spots on chest       PHYSICAL EXAM  VITAL SIGNS: /76   Pulse 91   Temp 37.1 °C (98.8 °F)   Resp 20   Ht 1.651 m (5' 5\")   Wt 68.7 kg (151 lb 7.3 oz)   LMP 01/01/2002   SpO2 99%   BMI 25.20 kg/m²   Vitals reviewed by myself.  Physical Exam   Constitutional: She is oriented to person, place, and time and well-developed, well-nourished, and in no distress. No distress.   HENT:   Head: Normocephalic.   Eyes: EOM are normal.   Neck: Normal range of motion.   Cardiovascular: Normal rate, regular rhythm and normal heart sounds.  Exam reveals no gallop and no friction rub.    No murmur heard.  Pulmonary/Chest: Effort normal and breath sounds normal. No respiratory distress. She has no wheezes. She has no rales.   Abdominal: Soft. She exhibits no distension. There is tenderness (mild tenderness to palpation in the bilateral lower quadrants, no rebound, no guarding). There is no rebound and no guarding.   Genitourinary:   Genitourinary Comments: Normal external genitalia. Cervix is closed, no bleeding or discharge from the os   Musculoskeletal: Normal range of motion.   Neurological: She is alert and oriented to person, place, and time.     DIAGNOSTIC STUDIES /  LABS  Labs Reviewed   CBC WITH DIFFERENTIAL - Abnormal; Notable for the " following:        Result Value    RBC 3.67 (*)     Hematocrit 33.8 (*)     MCHC 35.8 (*)     Neutrophils-Polys 79.10 (*)     Lymphocytes 13.00 (*)     Neutrophils (Absolute) 8.48 (*)     All other components within normal limits   COMP METABOLIC PANEL - Abnormal; Notable for the following:     Glucose 249 (*)     All other components within normal limits   URINALYSIS,CULTURE IF INDICATED - Abnormal; Notable for the following:     Character Sl Cloudy (*)     Ketones Trace (*)     Protein 100 (*)     All other components within normal limits   URINE MICROSCOPIC (W/UA) - Abnormal; Notable for the following:     Bacteria Few (*)     Urine Casts 3-5 Hyaline (*)     All other components within normal limits   LIPASE   HCG QUALITATIVE UR   WET PREP   CHLAMYDIA/GC PCR URINE OR SWAB   REFRACTOMETER SG   ESTIMATED GFR      All labs reviewed by me.    RADIOLOGY  CT-ABDOMEN-PELVIS WITH   Final Result      1.  Segmental distal colonic wall thickening with transmural edema. This is suspicious for infectious colitis. The distribution is not good for ischemic colitis. Inflammatory bowel disease is a possibility      2.  Status post cholecystectomy with moderate intrahepatic ductal dilatation. Recommend correlation with liver function tests for hyperbilirubinemia. Differential includes ampullary stricture favored over pancreas head mass or nonvisualized stone.      3.  Mild pancreatic ductal dilatation without evidence of pancreatitis. Recommend correlation for elevated pancreatic enzymes      4.  Mild splenomegaly      5.  Mildly above-average colonic stool volume      6.  Stable small right supraumbilical abdominal wall hernia contains fat        The radiologist's interpretation of all radiological studies have been reviewed by me.      COURSE & MEDICAL DECISION MAKING  Nursing notes, VS, PMSFHx reviewed in chart.    Patient is a 52-year-old female who comes in for abdominal pain. Differential diagnosis includes colitis,  gastroenteritis, diverticulitis, dehydration, electrolyte abnormality, urinary tract infection, pelvic infection. Diagnostic workup includes labs and CT of the abdomen.    Patient's initial vitals are within normal limits. She is treated with Zofran and morphine after which her pain improves. Labs returned and are unremarkable. Urinalysis and pelvic labs demonstrate no signs of infection. CT scan is notable for colitis. Patient is also noted to have enlarged intrahepatic ducts and pancreatic ducts, however lipase and liver enzymes are within normal limits. Therefore patient is advised that she will be treated for colitis with ciprofloxacin and Flagyl. She already has pain medication available to her at home and therefore will not be prescribed any further pain medication. She will be prescribed Zofran for nausea. Patient is agreeable to this plan. She is given strict return precautions and discharged home in stable condition with vitals in normal limits.      FINAL IMPRESSION  1. Colitis    2. Generalized abdominal pain

## 2017-12-26 NOTE — ED NOTES
Chief Complaint   Patient presents with   • Abdominal Pain     lower abd pain     Pt stated it started around Thanksgiving and went away however came back on Dec 15. Pt states its continuing to worsen and presents to ER for evaluation.

## 2017-12-26 NOTE — ED NOTES
Pt given written and oral discharge instructions. Pt verbalized understanding of all discharge instructions given. All questions answered. VSS. IV access removed. Pt educated on printed prescriptions given and side effects of medications. Pt instructed not to drive as she has had narcotic pain medication. Pt verbalized understanding and stated her daughter is coming to pick her up. Pt ambulating independently with steady gait to lobby. Pt instructed on s/s of when to return to ER. Pt verbalized understanding.

## 2017-12-27 ENCOUNTER — PATIENT OUTREACH (OUTPATIENT)
Dept: HEALTH INFORMATION MANAGEMENT | Facility: OTHER | Age: 52
End: 2017-12-27

## 2017-12-27 NOTE — PROGRESS NOTES
Placed discharge outreach phone call to patient s/p ER discharge 12/26/17.  Received recording stating that pt's mailbox is full and no further messages can be left.  Discharge outreach letter mailed to patient.

## 2017-12-27 NOTE — LETTER
Yuki Riddle  [address]  [place] [address]    December 27, 2017      Dear Yuki Riddle,    [facility] wants to ensure your discharge home is safe and you or your loved ones have had all of your questions answered regarding your care after you leave the hospital.    Our discharge team was unsuccessful in our attempts to contact you telephonically and we wanted to be sure that you had a list of resources and contact information should you have any questions regarding your hospital stay, follow-up instructions, or active medical symptoms.    Questions or Concerns Regarding… Contact   Medical Questions Related to Your Discharge  (7 days a week, 8am-5pm) Contact a Nurse Care Coordinator   [phone]   Medical Questions Not Related to Your Discharge  (24 hours a day / 7 days a week)  Contact the Nurse Health Line   [phone]    Medications or Discharge Instructions Refer to your discharge packet   or contact your -[phone]   Follow-up Appointment(s) Schedule your appointment via [employer]   or contact Scheduling [phone]   Billing Review your statement via [employer]  or contact Billing [phone]   Medical Records Review your records via [employer]   or contact Medical Records [phone]     You can also easily access your medical information, test results and upcoming appointments via the [employer] free online health management tool. You can learn more and sign up at [employer]. For assistance setting up your [employer] account, please call [phone].    Once again, we want to ensure your discharge home is safe and that you have a clear understanding of any next steps in your care. If you have any questions or concerns, please do not hesitate to contact us, we are here for you. Thank you for choosing [facility] for your healthcare needs.    Sincerely,      Your [facility] Healthcare Team

## 2017-12-28 LAB
C TRACH DNA SPEC QL NAA+PROBE: NEGATIVE
N GONORRHOEA DNA SPEC QL NAA+PROBE: NEGATIVE
SPECIMEN SOURCE: NORMAL

## 2018-02-28 ENCOUNTER — HOSPITAL ENCOUNTER (OUTPATIENT)
Facility: MEDICAL CENTER | Age: 53
End: 2018-02-28
Attending: INTERNAL MEDICINE
Payer: MEDICAID

## 2018-03-06 ENCOUNTER — HOSPITAL ENCOUNTER (OUTPATIENT)
Facility: MEDICAL CENTER | Age: 53
End: 2018-03-06
Attending: INTERNAL MEDICINE
Payer: MEDICAID

## 2018-03-06 PROCEDURE — 87493 C DIFF AMPLIFIED PROBE: CPT

## 2018-03-07 ENCOUNTER — HOSPITAL ENCOUNTER (EMERGENCY)
Facility: MEDICAL CENTER | Age: 53
End: 2018-03-07
Attending: EMERGENCY MEDICINE
Payer: MEDICAID

## 2018-03-07 VITALS
HEART RATE: 98 BPM | SYSTOLIC BLOOD PRESSURE: 154 MMHG | RESPIRATION RATE: 17 BRPM | BODY MASS INDEX: 23.88 KG/M2 | OXYGEN SATURATION: 97 % | TEMPERATURE: 98.6 F | HEIGHT: 65 IN | WEIGHT: 143.3 LBS | DIASTOLIC BLOOD PRESSURE: 79 MMHG

## 2018-03-07 DIAGNOSIS — R19.7 NAUSEA VOMITING AND DIARRHEA: ICD-10-CM

## 2018-03-07 DIAGNOSIS — R68.89 RIGORS: ICD-10-CM

## 2018-03-07 DIAGNOSIS — E16.2 HYPOGLYCEMIA: ICD-10-CM

## 2018-03-07 DIAGNOSIS — R11.2 NAUSEA VOMITING AND DIARRHEA: ICD-10-CM

## 2018-03-07 LAB
ANION GAP SERPL CALC-SCNC: 6 MMOL/L (ref 0–11.9)
APPEARANCE UR: CLEAR
BACTERIA #/AREA URNS HPF: ABNORMAL /HPF
BASOPHILS # BLD AUTO: 0.4 % (ref 0–1.8)
BASOPHILS # BLD: 0.03 K/UL (ref 0–0.12)
BILIRUB UR QL STRIP.AUTO: NEGATIVE
BUN SERPL-MCNC: 17 MG/DL (ref 8–22)
C DIFF DNA SPEC QL NAA+PROBE: NEGATIVE
C DIFF TOX GENS STL QL NAA+PROBE: NEGATIVE
CALCIUM SERPL-MCNC: 8.8 MG/DL (ref 8.4–10.2)
CHLORIDE SERPL-SCNC: 103 MMOL/L (ref 96–112)
CO2 SERPL-SCNC: 30 MMOL/L (ref 20–33)
COLOR UR: YELLOW
CREAT SERPL-MCNC: 0.93 MG/DL (ref 0.5–1.4)
CULTURE IF INDICATED INDCX: NO UA CULTURE
EOSINOPHIL # BLD AUTO: 0.04 K/UL (ref 0–0.51)
EOSINOPHIL NFR BLD: 0.6 % (ref 0–6.9)
EPI CELLS #/AREA URNS HPF: ABNORMAL /HPF
ERYTHROCYTE [DISTWIDTH] IN BLOOD BY AUTOMATED COUNT: 41.4 FL (ref 35.9–50)
GLUCOSE BLD-MCNC: 121 MG/DL (ref 65–99)
GLUCOSE BLD-MCNC: 63 MG/DL (ref 65–99)
GLUCOSE SERPL-MCNC: 73 MG/DL (ref 65–99)
GLUCOSE UR STRIP.AUTO-MCNC: 100 MG/DL
HCT VFR BLD AUTO: 43.9 % (ref 37–47)
HGB BLD-MCNC: 15.3 G/DL (ref 12–16)
IMM GRANULOCYTES # BLD AUTO: 0.01 K/UL (ref 0–0.11)
IMM GRANULOCYTES NFR BLD AUTO: 0.1 % (ref 0–0.9)
KETONES UR STRIP.AUTO-MCNC: NEGATIVE MG/DL
LEUKOCYTE ESTERASE UR QL STRIP.AUTO: NEGATIVE
LIPASE SERPL-CCNC: <10 U/L (ref 7–58)
LYMPHOCYTES # BLD AUTO: 0.22 K/UL (ref 1–4.8)
LYMPHOCYTES NFR BLD: 3.1 % (ref 22–41)
MCH RBC QN AUTO: 31.9 PG (ref 27–33)
MCHC RBC AUTO-ENTMCNC: 34.9 G/DL (ref 33.6–35)
MCV RBC AUTO: 91.5 FL (ref 81.4–97.8)
MICRO URNS: ABNORMAL
MONOCYTES # BLD AUTO: 0.31 K/UL (ref 0–0.85)
MONOCYTES NFR BLD AUTO: 4.4 % (ref 0–13.4)
MUCOUS THREADS #/AREA URNS HPF: ABNORMAL /HPF
NEUTROPHILS # BLD AUTO: 6.45 K/UL (ref 2–7.15)
NEUTROPHILS NFR BLD: 91.4 % (ref 44–72)
NITRITE UR QL STRIP.AUTO: NEGATIVE
NRBC # BLD AUTO: 0 K/UL
NRBC BLD-RTO: 0 /100 WBC
PH UR STRIP.AUTO: 6 [PH]
PLATELET # BLD AUTO: 197 K/UL (ref 164–446)
PMV BLD AUTO: 11.4 FL (ref 9–12.9)
POTASSIUM SERPL-SCNC: 3.7 MMOL/L (ref 3.6–5.5)
PROT UR QL STRIP: 100 MG/DL
RBC # BLD AUTO: 4.8 M/UL (ref 4.2–5.4)
RBC # URNS HPF: ABNORMAL /HPF
RBC UR QL AUTO: ABNORMAL
SODIUM SERPL-SCNC: 139 MMOL/L (ref 135–145)
SP GR UR STRIP.AUTO: 1.01
UNIDENT CRYS URNS QL MICRO: ABNORMAL /HPF
WBC # BLD AUTO: 7.1 K/UL (ref 4.8–10.8)
WBC #/AREA URNS HPF: ABNORMAL /HPF

## 2018-03-07 PROCEDURE — 700111 HCHG RX REV CODE 636 W/ 250 OVERRIDE (IP): Performed by: EMERGENCY MEDICINE

## 2018-03-07 PROCEDURE — 96374 THER/PROPH/DIAG INJ IV PUSH: CPT

## 2018-03-07 PROCEDURE — 96376 TX/PRO/DX INJ SAME DRUG ADON: CPT

## 2018-03-07 PROCEDURE — 700105 HCHG RX REV CODE 258: Performed by: EMERGENCY MEDICINE

## 2018-03-07 PROCEDURE — 99285 EMERGENCY DEPT VISIT HI MDM: CPT

## 2018-03-07 PROCEDURE — 82962 GLUCOSE BLOOD TEST: CPT

## 2018-03-07 PROCEDURE — 81001 URINALYSIS AUTO W/SCOPE: CPT

## 2018-03-07 PROCEDURE — 96361 HYDRATE IV INFUSION ADD-ON: CPT

## 2018-03-07 PROCEDURE — 83690 ASSAY OF LIPASE: CPT

## 2018-03-07 PROCEDURE — 700101 HCHG RX REV CODE 250: Performed by: EMERGENCY MEDICINE

## 2018-03-07 PROCEDURE — 80048 BASIC METABOLIC PNL TOTAL CA: CPT

## 2018-03-07 PROCEDURE — 85025 COMPLETE CBC W/AUTO DIFF WBC: CPT

## 2018-03-07 PROCEDURE — 96375 TX/PRO/DX INJ NEW DRUG ADDON: CPT

## 2018-03-07 RX ORDER — ONDANSETRON 2 MG/ML
8 INJECTION INTRAMUSCULAR; INTRAVENOUS ONCE
Status: COMPLETED | OUTPATIENT
Start: 2018-03-07 | End: 2018-03-07

## 2018-03-07 RX ORDER — ONDANSETRON 2 MG/ML
4 INJECTION INTRAMUSCULAR; INTRAVENOUS ONCE
Status: COMPLETED | OUTPATIENT
Start: 2018-03-07 | End: 2018-03-07

## 2018-03-07 RX ORDER — DEXTROSE MONOHYDRATE 25 G/50ML
50 INJECTION, SOLUTION INTRAVENOUS ONCE
Status: COMPLETED | OUTPATIENT
Start: 2018-03-07 | End: 2018-03-07

## 2018-03-07 RX ORDER — SODIUM CHLORIDE 9 MG/ML
1000 INJECTION, SOLUTION INTRAVENOUS ONCE
Status: COMPLETED | OUTPATIENT
Start: 2018-03-07 | End: 2018-03-07

## 2018-03-07 RX ORDER — MORPHINE SULFATE 4 MG/ML
4 INJECTION, SOLUTION INTRAMUSCULAR; INTRAVENOUS ONCE
Status: COMPLETED | OUTPATIENT
Start: 2018-03-07 | End: 2018-03-07

## 2018-03-07 RX ORDER — ONDANSETRON 4 MG/1
4 TABLET, ORALLY DISINTEGRATING ORAL EVERY 6 HOURS PRN
Qty: 20 TAB | Refills: 0 | Status: SHIPPED | OUTPATIENT
Start: 2018-03-07 | End: 2018-05-23

## 2018-03-07 RX ADMIN — ONDANSETRON 4 MG: 2 INJECTION INTRAMUSCULAR; INTRAVENOUS at 04:01

## 2018-03-07 RX ADMIN — ONDANSETRON 4 MG: 2 INJECTION INTRAMUSCULAR; INTRAVENOUS at 02:45

## 2018-03-07 RX ADMIN — ONDANSETRON 8 MG: 2 INJECTION INTRAMUSCULAR; INTRAVENOUS at 01:46

## 2018-03-07 RX ADMIN — DEXTROSE MONOHYDRATE 50 ML: 25 INJECTION, SOLUTION INTRAVENOUS at 01:37

## 2018-03-07 RX ADMIN — SODIUM CHLORIDE 1000 ML: 9 INJECTION, SOLUTION INTRAVENOUS at 01:47

## 2018-03-07 RX ADMIN — MORPHINE SULFATE 4 MG: 4 INJECTION INTRAVENOUS at 01:48

## 2018-03-07 NOTE — ED PROVIDER NOTES
ED Provider Note    CHIEF COMPLAINT  Chief Complaint   Patient presents with   • N/V   • Abdominal Pain   • Diarrhea       HPI  Yuki Riddle is a 52 y.o. female who presents to the emergency Department chief complaint of nausea vomiting and diarrhea. The patient states his symptoms began yesterday this morning and she hasn't been able to keep anything down since. Her last dose of insulin was Monday evening but she still had low sugars throughout the day because she hasn't been able to manage anything. She states she's felt hot and cold and feels like this is a viral syndrome even though no one is ill at home. Any blood in her stool or emesis. She states she had a diagnosis of colitis over the last few months, and her primary GI physician is told her she may have gastroparesis but she's never had this study. She feels like this is more of a viral syndrome she has generalized abdominal cramping more in the epigastric area. She denies any dysuria or difficulty breathing. Accu-Chek on arrival is 61    REVIEW OF SYSTEMS  See HPI for further details. All other systems are negative.     PAST MEDICAL HISTORY   has a past medical history of Arthritis; Asthma; Bowel habit changes; Breath shortness; Bronchitis (12/2016); Cataract; Cold (12/3/2016); Dental disorder; Diabetes; Diabetic retinopathy; Fibromyalgia; Heart burn; Hypertension; Indigestion; Migraine; Neuropathy (CMS-HCC); Pneumonia (1993); Psychiatric problem; Rheumatoid arthritis (CMS-HCC); Sleep apnea; TIA (transient ischemic attack); Urinary bladder disorder; and Urinary incontinence.    SOCIAL HISTORY  Social History     Social History Main Topics   • Smoking status: Current Every Day Smoker     Packs/day: 0.50     Years: 29.00     Types: Cigarettes   • Smokeless tobacco: Never Used      Comment: 1/2 PPD   • Alcohol use No   • Drug use: No   • Sexual activity: Not on file       SURGICAL HISTORY   has a past surgical history that includes tonsillectomy;  "cholecystectomy; primary c section; other; foreign body removal (6/18/2013); gastroscopy with balloon dilatation (2/13/2015); and gastroscopy with biopsy (2/13/2015).    CURRENT MEDICATIONS  Home Medications    **Home medications have not yet been reviewed for this encounter**         ALLERGIES  Allergies   Allergen Reactions   • Compazine      Aggressive behavior    • Sulfa Drugs Itching     Blotchy spots on chest       PHYSICAL EXAM  VITAL SIGNS: /91   Pulse (!) 124   Temp 37.8 °C (100 °F)   Resp (!) 22   Ht 1.651 m (5' 5\")   Wt 65 kg (143 lb 4.8 oz)   LMP 01/01/2002   SpO2 97%   BMI 23.85 kg/m²    Pulse ox interpretation: I interpret this pulse ox as normal.  Constitutional: Alert in mild distress  HENT: Normocephalic atraumatic, dry mucous membranes  Eyes: PER, Conjunctiva normal, Non-icteric.   Neck: Normal range of motion, No tenderness, Supple, No stridor.   Lymphatic: No lymphadenopathy noted.   Cardiovascular: Tachycardic regular rhythm no murmurs.   Thorax & Lungs: Normal breath sounds, No respiratory distress, No wheezing, No chest tenderness.   Abdomen: Bowel sounds normal, Soft, generalized abdominal tenderness without rebound or guarding, No pulsatile masses. No peritoneal signs.  Skin: Warm, Dry, No erythema, No rash.   Back: No bony tenderness, No CVA tenderness.   Extremities: Intact distal pulses, No edema, No tenderness, No cyanosis  Musculoskeletal: Good range of motion in all major joints. No tenderness to palpation or major deformities noted.   Neurologic: Alert and oriented x3, No focal deficits noted.   Psychiatric: Affect normal, Judgment normal, Mood normal.       DIFFERENTIAL DIAGNOSIS AND WORK UP PLAN    This is a 52 y.o. female who presents with nausea vomiting diarrhea and hypoglycemia, she appears ill in nature with some dehydration on examination and tachycardia, we'll treat her with IV fluids anti-emetics pain management laboratory analysis was given D5 to help improve " "her sugars. She hasn't had insulin for over 24 hours at least per history she shouldn't have any further drops. Differential includes colitis diverticulitis gastroenteritis esophagitis have a history of a cholecystectomy urinary tract infection or pyelo    DIAGNOSTIC STUDIES / PROCEDURES    LABS  Pertinent Lab Findings  CBC within normal limits, mild left shift, BMP within normal limits without signs of DKA or electrolyte abnormality or DKA, urinalysis without signs of infection only mild dehydration I paced normal      COURSE & MEDICAL DECISION MAKING  Pertinent Labs & Imaging studies reviewed. (See chart for details)    2:13 AM  Reassess the patient and the bedside she states she started to feel little better not imminently nauseous but still just a little maybe seasick with mild nausea states he feels like her stomach is cramping but she is starting to feel better. We will repeat the Zofran dose we discussed her laboratory findings which are quite impressively normal. And then check a urinalysis. She is care home thru her fluids and starting to feel better    3:06 AM  The patient has been tolerating ice chips, will wake her up to attempt urine sample    3:48 AM  Reassess the patient at the bedside, urinalysis without signs of infection she states she is little nauseated again but has been tolerating by mouth's and has not vomited since she arrived here in the ED. We'll send her home with Zofran as needed for nausea vomiting with strict return precautions including her worsening low blood sugars though she is improved here without difficulty vomiting and diarrhea or blood in her emesis or stool or concern for dehydration.    /79   Pulse 98   Temp 37 °C (98.6 °F)   Resp 17   Ht 1.651 m (5' 5\")   Wt 65 kg (143 lb 4.8 oz)   LMP 01/01/2002   SpO2 97%   BMI 23.85 kg/m²       The patient will return for new or worsening symptoms and is stable at the time of discharge.    The patient is referred to a primary " physician for blood pressure management, diabetic screening, and for all other preventative health concerns.    DISPOSITION:  Patient will be discharged home in stable condition.    FOLLOW UP:  [facility], Emergency Dept  [address]  [facility] [id number] [phone]  In 1 day  If symptoms worsen    [address] [address]  [phone]  Schedule an appointment as soon as possible for a visit        OUTPATIENT MEDICATIONS:  New Prescriptions    No medications on file         FINAL IMPRESSION  1. Nausea vomiting and diarrhea    2. Rigors    3. Hypoglycemia          Electronically signed by: Michelle Mcgee, 3/7/2018 1:31 AM    This dictation has been created using voice recognition software and/or scribes. The accuracy of the dictation is limited by the abilities of the software and the expertise of the scribes. I expect there may be some errors of grammar and possibly content. I made every attempt to manually correct the errors within my dictation. However, errors related to voice recognition software and/or scribes may still exist and should be interpreted within the appropriate context.

## 2018-03-07 NOTE — DISCHARGE INSTRUCTIONS
Viral Gastroenteritis, Adult  Viral gastroenteritis is also known as the stomach flu. This condition is caused by various viruses. These viruses can be passed from person to person very easily (are very contagious). This condition may affect your stomach, small intestine, and large intestine. It can cause sudden watery diarrhea, fever, and vomiting.  Diarrhea and vomiting can make you feel weak and cause you to become dehydrated. You may not be able to keep fluids down. Dehydration can make you tired and thirsty, cause you to have a dry mouth, and decrease how often you urinate. Older adults and people with other diseases or a weak immune system are at higher risk for dehydration.  It is important to replace the fluids that you lose from diarrhea and vomiting. If you become severely dehydrated, you may need to get fluids through an IV tube.  What are the causes?  Gastroenteritis is caused by various viruses, including rotavirus and norovirus. Norovirus is the most common cause in adults.  You can get sick by eating food, drinking water, or touching a surface contaminated with one of these viruses. You can also get sick from sharing utensils or other personal items with an infected person.  What increases the risk?  This condition is more likely to develop in people:  · Who have a weak defense system (immune system).  · Who live with one or more children who are younger than 2 years old.  · Who live in a nursing home.  · Who go on cruise ships.  What are the signs or symptoms?  Symptoms of this condition start suddenly 1-2 days after exposure to a virus. Symptoms may last a few days or as long as a week. The most common symptoms are watery diarrhea and vomiting. Other symptoms include:  · Fever.  · Headache.  · Fatigue.  · Pain in the abdomen.  · Chills.  · Weakness.  · Nausea.  · Muscle aches.  · Loss of appetite.  How is this diagnosed?  This condition is diagnosed with a medical history and physical exam. You may  also have a stool test to check for viruses or other infections.  How is this treated?  This condition typically goes away on its own. The focus of treatment is to restore lost fluids (rehydration). Your health care provider may recommend that you take an oral rehydration solution (ORS) to replace important salts and minerals (electrolytes) in your body. Severe cases of this condition may require giving fluids through an IV tube.  Treatment may also include medicine to help with your symptoms.  Follow these instructions at home:  Follow instructions from your health care provider about how to care for yourself at home.  Eating and drinking  Follow these recommendations as told by your health care provider:  · Take an ORS. This is a drink that is sold at pharmacies and retail stores.  · Drink clear fluids in small amounts as you are able. Clear fluids include water, ice chips, diluted fruit juice, and low-calorie sports drinks.  · Eat bland, easy-to-digest foods in small amounts as you are able. These foods include bananas, applesauce, rice, lean meats, toast, and crackers.  · Avoid fluids that contain a lot of sugar or caffeine, such as energy drinks, sports drinks, and soda.  · Avoid alcohol.  · Avoid spicy or fatty foods.  General instructions  · Drink enough fluid to keep your urine clear or pale yellow.  · Wash your hands often. If soap and water are not available, use hand .  · Make sure that all people in your household wash their hands well and often.  · Take over-the-counter and prescription medicines only as told by your health care provider.  · Rest at home while you recover.  · Watch your condition for any changes.  · Take a warm bath to relieve any burning or pain from frequent diarrhea episodes.  · Keep all follow-up visits as told by your health care provider. This is important.  Contact a health care provider if:  · You cannot keep fluids down.  · Your symptoms get worse.  · You have new  symptoms.  · You feel light-headed or dizzy.  · You have muscle cramps.  Get help right away if:  · You have chest pain.  · You feel extremely weak or you faint.  · You see blood in your vomit.  · Your vomit looks like coffee grounds.  · You have bloody or black stools or stools that look like tar.  · You have a severe headache, a stiff neck, or both.  · You have a rash.  · You have severe pain, cramping, or bloating in your abdomen.  · You have trouble breathing or you are breathing very quickly.  · Your heart is beating very quickly.  · Your skin feels cold and clammy.  · You feel confused.  · You have pain when you urinate.  · You have signs of dehydration, such as:  ¨ Dark urine, very little urine, or no urine.  ¨ Cracked lips.  ¨ Dry mouth.  ¨ Sunken eyes.  ¨ Sleepiness.  ¨ Weakness.  This information is not intended to replace advice given to you by your health care provider. Make sure you discuss any questions you have with your health care provider.  Document Released: 12/18/2006 Document Revised: 05/31/2017 Document Reviewed: 08/23/2016  [employer] Patient Education © 2017 [employer]  Vomiting, Adult  Vomiting occurs when stomach contents are thrown up and out of the mouth. Many people notice nausea before vomiting. Vomiting can make you feel weak and dehydrated. Dehydration can make you tired and thirsty, cause you to have a dry mouth, and decrease how often you urinate. Older adults and people who have other diseases or a weak immune system are at higher risk for dehydration. It is important to treat vomiting as told by your health care provider.  Follow these instructions at home:  Follow your health care provider’s instructions about how to care for yourself at home.  Eating and drinking  Follow these recommendations as told by your health care provider:  · Take an oral rehydration solution (ORS). This is a drink that is sold at pharmacies and retail stores.  · Eat bland, easy-to-digest  foods in small amounts as you are able. These foods include bananas, applesauce, rice, lean meats, toast, and crackers.  · Drink clear fluids in small amounts as you are able. Clear fluids include water, ice chips, low-calorie sports drinks, and fruit juice that has water added (diluted fruit juice).  · Avoid fluids that contain a lot of sugar or caffeine.  · Avoid alcohol and foods that are spicy or fatty.  General instructions  · Wash your hands frequently with soap and water. If soap and water are not available, use hand . Make sure that everyone in your household washes their hands frequently.  · Take over-the-counter and prescription medicines only as told by your health care provider.  · Watch your condition for any changes.  · Keep all follow-up visits as told by your health care provider. This is important.  Contact a health care provider if:  · You have a fever.  · You are not able to keep fluids down.  · Your vomiting gets worse.  · You have new symptoms.  · You feel light-headed or dizzy.  · You have a headache.  · You have muscle cramps.  Get help right away if:  · You have pain in your chest, neck, arm, or jaw.  · You feel extremely weak or you faint.  · You have persistent vomiting.  · You have vomit that is bright red or looks like black coffee grounds.  · You have stools that are bloody or black, or stools that look like tar.  · You have severe pain, cramping, or bloating in your abdomen.  · You have a severe headache, a stiff neck, or both.  · You have a rash.  · You have trouble breathing or you are breathing very quickly.  · Your heart is beating very quickly.  · Your skin feels cold and clammy.  · You feel confused.  · You have pain while urinating.  · You have signs of dehydration, such as:  ¨ Dark urine, or very little or no urine.  ¨ Cracked lips.  ¨ Dry mouth.  ¨ Sunken eyes.  ¨ Sleepiness.  ¨ Weakness.  These symptoms may represent a serious problem that is an emergency. Do not wait  to see if the symptoms will go away. Get medical help right away. Call your local emergency services (911 in the [place] Do not drive yourself to the hospital.   This information is not intended to replace advice given to you by your health care provider. Make sure you discuss any questions you have with your health care provider.  Document Released: 01/13/2017 Document Revised: 05/25/2017 Document Reviewed: 08/23/2016  [employer] Interactive Patient Education © 2017 [employer]

## 2018-03-07 NOTE — ED NOTES
Pt dc'd home with instructions for her to f/u with her gi, rechecked pt's bgl 121 mg/dl, opportunity provided to ask questions, pt states she cannot find a ride at this time, pt will wait in waiting room as per ed policy, pt given ginger ale and warm blanket.

## 2018-03-08 ENCOUNTER — PATIENT OUTREACH (OUTPATIENT)
Dept: HEALTH INFORMATION MANAGEMENT | Facility: OTHER | Age: 53
End: 2018-03-08

## 2018-05-07 ENCOUNTER — HOSPITAL ENCOUNTER (EMERGENCY)
Dept: HOSPITAL 8 - ED | Age: 53
Discharge: HOME | End: 2018-05-07
Payer: MEDICAID

## 2018-05-07 VITALS — HEIGHT: 65 IN | WEIGHT: 148.59 LBS | BODY MASS INDEX: 24.76 KG/M2

## 2018-05-07 VITALS — DIASTOLIC BLOOD PRESSURE: 79 MMHG | SYSTOLIC BLOOD PRESSURE: 170 MMHG

## 2018-05-07 DIAGNOSIS — N93.8: ICD-10-CM

## 2018-05-07 DIAGNOSIS — R10.2: Primary | ICD-10-CM

## 2018-05-07 DIAGNOSIS — F17.200: ICD-10-CM

## 2018-05-07 DIAGNOSIS — Z79.4: ICD-10-CM

## 2018-05-07 DIAGNOSIS — E10.65: ICD-10-CM

## 2018-05-07 LAB
ALBUMIN SERPL-MCNC: 3.7 G/DL (ref 3.4–5)
ALP SERPL-CCNC: 159 U/L (ref 45–117)
ALT SERPL-CCNC: 32 U/L (ref 12–78)
ANION GAP SERPL CALC-SCNC: 7 MMOL/L (ref 5–15)
BASOPHILS # BLD AUTO: 0.03 X10^3/UL (ref 0–0.1)
BASOPHILS NFR BLD AUTO: 1 % (ref 0–1)
BILIRUB SERPL-MCNC: 0.3 MG/DL (ref 0.2–1)
CALCIUM SERPL-MCNC: 9.3 MG/DL (ref 8.5–10.1)
CHLORIDE SERPL-SCNC: 108 MMOL/L (ref 98–107)
CREAT SERPL-MCNC: 1.04 MG/DL (ref 0.55–1.02)
CULTURE INDICATED?: NO
EOSINOPHIL # BLD AUTO: 0.08 X10^3/UL (ref 0–0.4)
EOSINOPHIL NFR BLD AUTO: 1 % (ref 1–7)
ERYTHROCYTE [DISTWIDTH] IN BLOOD BY AUTOMATED COUNT: 13.9 % (ref 9.6–15.2)
LYMPHOCYTES # BLD AUTO: 1.68 X10^3/UL (ref 1–3.4)
LYMPHOCYTES NFR BLD AUTO: 30 % (ref 22–44)
MCH RBC QN AUTO: 31.6 PG (ref 27–34.8)
MCHC RBC AUTO-ENTMCNC: 33.5 G/DL (ref 32.4–35.8)
MCV RBC AUTO: 94.6 FL (ref 80–100)
MD: NO
MICROSCOPIC: (no result)
MONOCYTES # BLD AUTO: 0.36 X10^3/UL (ref 0.2–0.8)
MONOCYTES NFR BLD AUTO: 6 % (ref 2–9)
NEUTROPHILS # BLD AUTO: 3.51 X10^3/UL (ref 1.8–6.8)
NEUTROPHILS NFR BLD AUTO: 62 % (ref 42–75)
PLATELET # BLD AUTO: 204 X10^3/UL (ref 130–400)
PMV BLD AUTO: 9.6 FL (ref 7.4–10.4)
PROT SERPL-MCNC: 7 G/DL (ref 6.4–8.2)
RBC # BLD AUTO: 3.99 X10^6/UL (ref 3.82–5.3)

## 2018-05-07 PROCEDURE — 84703 CHORIONIC GONADOTROPIN ASSAY: CPT

## 2018-05-07 PROCEDURE — 82962 GLUCOSE BLOOD TEST: CPT

## 2018-05-07 PROCEDURE — 99285 EMERGENCY DEPT VISIT HI MDM: CPT

## 2018-05-07 PROCEDURE — 81001 URINALYSIS AUTO W/SCOPE: CPT

## 2018-05-07 PROCEDURE — 36415 COLL VENOUS BLD VENIPUNCTURE: CPT

## 2018-05-07 PROCEDURE — 80053 COMPREHEN METABOLIC PANEL: CPT

## 2018-05-07 PROCEDURE — 76830 TRANSVAGINAL US NON-OB: CPT

## 2018-05-07 PROCEDURE — 82800 BLOOD PH: CPT

## 2018-05-07 PROCEDURE — 85025 COMPLETE CBC W/AUTO DIFF WBC: CPT

## 2018-05-07 PROCEDURE — 82010 KETONE BODYS QUAN: CPT

## 2018-05-07 PROCEDURE — 83690 ASSAY OF LIPASE: CPT

## 2018-05-18 ENCOUNTER — HOSPITAL ENCOUNTER (OUTPATIENT)
Dept: RADIOLOGY | Facility: MEDICAL CENTER | Age: 53
End: 2018-05-18
Attending: INTERNAL MEDICINE
Payer: MEDICAID

## 2018-05-18 DIAGNOSIS — R10.2 ADNEXAL TENDERNESS, RIGHT: ICD-10-CM

## 2018-05-18 PROCEDURE — 74018 RADEX ABDOMEN 1 VIEW: CPT

## 2018-05-23 ENCOUNTER — OFFICE VISIT (OUTPATIENT)
Dept: MEDICAL GROUP | Facility: MEDICAL CENTER | Age: 53
End: 2018-05-23
Attending: INTERNAL MEDICINE
Payer: MEDICAID

## 2018-05-23 VITALS
HEART RATE: 106 BPM | BODY MASS INDEX: 24.83 KG/M2 | WEIGHT: 149 LBS | TEMPERATURE: 98.2 F | DIASTOLIC BLOOD PRESSURE: 68 MMHG | RESPIRATION RATE: 16 BRPM | OXYGEN SATURATION: 97 % | SYSTOLIC BLOOD PRESSURE: 120 MMHG | HEIGHT: 65 IN

## 2018-05-23 DIAGNOSIS — M06.9 RHEUMATOID ARTHRITIS OF HAND, UNSPECIFIED LATERALITY, UNSPECIFIED RHEUMATOID FACTOR PRESENCE: ICD-10-CM

## 2018-05-23 DIAGNOSIS — G56.01 CARPAL TUNNEL SYNDROME OF RIGHT WRIST: ICD-10-CM

## 2018-05-23 DIAGNOSIS — E11.40 PAINFUL DIABETIC NEUROPATHY (HCC): ICD-10-CM

## 2018-05-23 DIAGNOSIS — M25.561 CHRONIC PAIN OF BOTH KNEES: ICD-10-CM

## 2018-05-23 DIAGNOSIS — N83.209 CYST OF OVARY, UNSPECIFIED LATERALITY: ICD-10-CM

## 2018-05-23 DIAGNOSIS — M62.838 MUSCLE SPASM: ICD-10-CM

## 2018-05-23 DIAGNOSIS — N39.0 RECURRENT UTI: ICD-10-CM

## 2018-05-23 DIAGNOSIS — G47.00 INSOMNIA, UNSPECIFIED TYPE: ICD-10-CM

## 2018-05-23 DIAGNOSIS — E10.8 TYPE 1 DIABETES MELLITUS WITH COMPLICATION (HCC): ICD-10-CM

## 2018-05-23 DIAGNOSIS — G47.33 OSA (OBSTRUCTIVE SLEEP APNEA): ICD-10-CM

## 2018-05-23 DIAGNOSIS — G89.29 CHRONIC PAIN OF BOTH KNEES: ICD-10-CM

## 2018-05-23 DIAGNOSIS — K59.03 DRUG-INDUCED CONSTIPATION: ICD-10-CM

## 2018-05-23 DIAGNOSIS — M25.562 CHRONIC PAIN OF BOTH KNEES: ICD-10-CM

## 2018-05-23 DIAGNOSIS — F41.9 ANXIETY: ICD-10-CM

## 2018-05-23 PROBLEM — J40 BRONCHITIS: Status: RESOLVED | Noted: 2017-06-02 | Resolved: 2018-05-23

## 2018-05-23 PROBLEM — N95.0 POSTMENOPAUSAL BLEEDING: Status: RESOLVED | Noted: 2017-11-29 | Resolved: 2018-05-23

## 2018-05-23 PROBLEM — R10.2 PELVIC PAIN: Status: RESOLVED | Noted: 2017-11-26 | Resolved: 2018-05-23

## 2018-05-23 PROBLEM — F17.210 SMOKES 1/2 PACK PER DAY: Status: RESOLVED | Noted: 2017-06-02 | Resolved: 2018-05-23

## 2018-05-23 PROBLEM — R93.89 ABNORMAL CHEST X-RAY: Status: RESOLVED | Noted: 2017-01-05 | Resolved: 2018-05-23

## 2018-05-23 PROBLEM — G62.9 NEUROPATHY: Status: RESOLVED | Noted: 2017-08-02 | Resolved: 2018-05-23

## 2018-05-23 PROBLEM — R91.8 ABNORMAL CT SCAN, LUNG: Status: RESOLVED | Noted: 2017-01-05 | Resolved: 2018-05-23

## 2018-05-23 PROCEDURE — 99214 OFFICE O/P EST MOD 30 MIN: CPT | Performed by: INTERNAL MEDICINE

## 2018-05-23 PROCEDURE — 99204 OFFICE O/P NEW MOD 45 MIN: CPT | Performed by: INTERNAL MEDICINE

## 2018-05-23 RX ORDER — CYCLOBENZAPRINE HCL 10 MG
10 TABLET ORAL 2 TIMES DAILY PRN
Status: SHIPPED | DISCHARGE
Start: 2018-05-23 | End: 2018-06-06

## 2018-05-23 RX ORDER — DIAZEPAM 10 MG/1
10 TABLET ORAL EVERY 6 HOURS PRN
Qty: 120 TAB | Refills: 0 | Status: SHIPPED | DISCHARGE
Start: 2018-05-23 | End: 2018-06-22

## 2018-05-23 RX ORDER — CALCITRIOL 0.25 UG/1
0.25 CAPSULE, LIQUID FILLED ORAL DAILY
Qty: 30 CAP | Status: SHIPPED | DISCHARGE
Start: 2018-05-23 | End: 2018-06-06

## 2018-05-23 RX ORDER — HYDROCODONE BITARTRATE AND ACETAMINOPHEN 10; 325 MG/1; MG/1
2 TABLET ORAL 4 TIMES DAILY
Qty: 280 TAB | Refills: 0 | Status: SHIPPED | DISCHARGE
Start: 2018-05-23 | End: 2018-06-06

## 2018-05-23 RX ORDER — POLYETHYLENE GLYCOL 3350 17 G/17G
17 POWDER, FOR SOLUTION ORAL DAILY
Refills: 3 | Status: SHIPPED | DISCHARGE
Start: 2018-05-23 | End: 2018-06-06

## 2018-05-23 RX ORDER — CLONIDINE HYDROCHLORIDE 0.1 MG/1
0.1 TABLET ORAL NIGHTLY PRN
Status: SHIPPED | DISCHARGE
Start: 2018-05-23 | End: 2020-11-11

## 2018-05-23 RX ORDER — CARISOPRODOL 350 MG/1
350 TABLET ORAL 4 TIMES DAILY
Qty: 120 TAB | Refills: 0 | Status: SHIPPED | DISCHARGE
Start: 2018-05-23 | End: 2018-06-22

## 2018-05-23 RX ORDER — GABAPENTIN 600 MG/1
1200 TABLET ORAL 2 TIMES DAILY
Status: SHIPPED | DISCHARGE
Start: 2018-05-23 | End: 2018-06-06

## 2018-05-23 NOTE — ASSESSMENT & PLAN NOTE
Diagnosed as a teen with DM1.  Follows with Dr. Pathak of endocrinology.  Current insulin regimen is 28 u of Toujeo at night and 1-5 u of humalog before meals based on a sliding scale.  Reports very poor blood sugar control until recently.  Tries to keep her blood sugar between  but states she has worsening neuropathic pain in her hands with sugars below 100. States that her norco helps control her blood sugars.

## 2018-05-23 NOTE — ASSESSMENT & PLAN NOTE
Currently takes valium 10 mg.  She reports using int BID but is prescribed for QID and continues to refill it monthly.  Script comes from her endocrinologist who also provides her with high dose opiates.  Does not follow with psychiatry or a therapist.

## 2018-05-24 PROBLEM — Z86.73 HISTORY OF TIA (TRANSIENT ISCHEMIC ATTACK): Status: ACTIVE | Noted: 2018-05-24

## 2018-05-24 PROBLEM — E10.8 TYPE 1 DIABETES MELLITUS WITH COMPLICATION (HCC): Status: ACTIVE | Noted: 2017-11-27

## 2018-05-24 PROBLEM — G90.A POTS (POSTURAL ORTHOSTATIC TACHYCARDIA SYNDROME): Status: ACTIVE | Noted: 2018-05-24

## 2018-05-24 PROBLEM — M62.838 MUSCLE SPASM: Status: ACTIVE | Noted: 2018-05-24

## 2018-05-24 NOTE — PROGRESS NOTES
Yuki Riddle is a 52 y.o. female here for ovarian cysts, recurrent UTI, carpal tunnel requesting referrals, chronic medical problems as below, est care    HPI:  Has been seeing Dr. Pathak ([facility]) who has been also acting as her PCP until now    Type 1 diabetes mellitus with complication (HCC)  Diagnosed as a teen with DM1.  Follows with Dr. Pathak of endocrinology.  Current insulin regimen is 28 u of Toujeo at night and 1-5 u of humalog before meals based on a sliding scale.  Reports very poor blood sugar control until recently.  Tries to keep her blood sugar between  but states she has worsening neuropathic pain in her hands with sugars below 100. States that her norco helps control her blood sugars.    Anxiety  Currently takes valium 10 mg.  She reports using int BID but is prescribed for QID and continues to refill it monthly.  Script comes from her endocrinologist who also provides her with high dose opiates.  Does not follow with psychiatry or a therapist.    Painful diabetic neuropathy ([facility])  Reports she has insulin induced neuropathy.  She has seen neurology in the past (Dr. Win) who felt this was diabetic neuropathy and did not agree with the insulin induced neuropathy diagnosis.  She takes large amounts of opiates for this, which are prescribed by her endocrinologist.  Currently on extended release norco 50 mg BID and short acting norco 10/325 #240 a month.  Also takes calcitriol 0.25 mg 2 days per week for the neuropathy under the direction of her endocrinologist.    Rheumatoid arthritis ([facility])  Reports a history of RA which was diagnosed in 1999 after she had her last child.  Has never been treated or under the care of a rheumatologist.  States she was RF negative in the past but the diagnosis was made based on hand and foot x rays.  Last time these were done was over 10 years ago.  Complains of pain in both hands, stiffness in the mornings that improves throughout the day but  she is never able to fully make a fist.  Denies pain in other joints.    Recurrent UTI  Reports frequent/recurrent UTI's.  States that she currently has a UTI but is on cipro 500 mg BID for treatment.  States she experienced air in her urine with severe pain several weeks ago.  Went to the ER for this but we do not have the records.  Denies fevers, chills.  Requesting referral to urology.    Ovarian cyst  Reports recently being diagnosed with ovarian cysts during an ER visit for abdominal pain.  In her [facility] records, she has a pelvic US from 11/17 which did not show cysts.  We do not have the records from the diagnostic ER visit.  Reports bilateral daily pelvic pain.  Stopped having periods after she gave birth to her daughter in 1999.  Is requesting referral to gyn.    SYBIL (obstructive sleep apnea)  Under the care of pulmonology.  We did not discuss whether she has a CPAP/current treatment.    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  She is still receiving Belsomra from Dr. Pathak although states she rarely takes it.  Despite this, she gets it refilled every month.    Drug-induced constipation  Reports severe constipation.  Is on miralax prn but does not take it often.  Follows with Dr. Centeno of GI.  States she had a colonoscopy 1 year ago.    Chronic pain of both knees  Complains of bilateral knee pain which she has had for many years.  Told in the past this was from her RA.  Does not want surgery at this point if it were indicated.  Pain is daily, aching, throughout the entire knee on both sides.  Denies swelling.    Carpal tunnel syndrome of right wrist  Reports severe carpal tunnel in the right wrist.  Wears a wrist brace most of the time which alleviates the pain.  Worst at night if she does not have the brace on.  Reports numbness/tingling in fingers of right hand, hand weakness.  Requesting orthopedic  referral.    Muscle spasm  Currently takes both flexeril and soma.  States she was unaware they were both muscle relaxers.  States the soma is not very effective but the flexeril is helpful.  Soma is prescribed by Dr. Pathak, #120 a month.    Current medicines (including changes today)  Current Outpatient Prescriptions   Medication Sig Dispense Refill   • polyethylene glycol 3350 (MIRALAX) Powder Take 17 g by mouth every day.  3   • Insulin Glargine (TOUJEO SOLOSTAR) 300 UNIT/ML Solution Pen-injector Inject 28 Units as instructed every bedtime.     • insulin lispro (HUMALOG) 100 UNIT/ML Solution Inject 1-5 Units as instructed 3 times a day before meals. Sliding scale     • gabapentin (NEURONTIN) 600 MG tablet Take 2 Tabs by mouth 2 times a day.     • diazepam (VALIUM) 10 MG tablet Take 1 Tab by mouth every 6 hours as needed for Anxiety for up to 30 days. 120 Tab 0   • cloNIDine (CATAPRES) 0.1 MG Tab Take 1 Tab by mouth at bedtime as needed (insomnia).     • cyclobenzaprine (FLEXERIL) 10 MG Tab Take 1 Tab by mouth 2 times a day as needed.     • calcitRIOL (ROCALTROL) 0.25 MCG Cap Take 1 Cap by mouth every day. 30 Cap    • carisoprodol (SOMA) 350 MG Tab Take 1 Tab by mouth 4 times a day for 30 days. 120 Tab 0   • HYDROcodone/acetaminophen (NORCO)  MG Tab Take 2 Tabs by mouth 4 times a day for 30 days. 280 Tab 0   • Suvorexant (BELSOMRA) 15 MG Tab Take 1 Tab by mouth every bedtime.     • HYDROcodone Bitartrate 50 MG Capsule Extended Release 12 hour Abuse-Deterrent Take 1 Tab by mouth every 12 hours.       No current facility-administered medications for this visit.      She  has a past medical history of Arthritis; Cataract; COPD (chronic obstructive pulmonary disease) ([facility]); Diabetes; Diabetic neuropathy ([facility]); Diabetic retinopathy; Diabetic retinopathy ([facility]); Fibromyalgia; Hypertension; Migraine; POTS (postural orthostatic tachycardia syndrome); Recurrent UTI; Rheumatoid arthritis ([facility]); Rheumatoid arthritis  "(HCC); Sleep apnea; and TIA (transient ischemic attack).  She  has a past surgical history that includes tonsillectomy; cholecystectomy; primary c section; other; foreign body removal (6/18/2013); gastroscopy with balloon dilatation (2/13/2015); and gastroscopy with biopsy (2/13/2015).  Social History   Substance Use Topics   • Smoking status: Current Every Day Smoker     Packs/day: 0.50     Years: 29.00     Types: Cigarettes   • Smokeless tobacco: Never Used      Comment: 1/2 PPD   • Alcohol use No     Social History     Social History Narrative   • No narrative on file     Family History   Problem Relation Age of Onset   • No Known Problems Sister    • No Known Problems Brother    • No Known Problems Brother    • No Known Problems Daughter    • No Known Problems Daughter          ROS  As above in HPI  All other systems reviewed and are negative     Objective:     Blood pressure 120/68, pulse (!) 106, temperature 36.8 °C (98.2 °F), resp. rate 16, height 1.651 m (5' 5\"), weight 67.6 kg (149 lb), last menstrual period 01/01/2002, SpO2 97 %. Body mass index is 24.79 kg/m².  Physical Exam:    Constitutional: Alert, no distress.  Skin: Warm, dry, good turgor, no rashes in visible areas.  Eye: Equal, round and reactive, conjunctiva clear, lids normal.  ENMT: Lips without lesions, good dentition, oropharynx clear, TM's clear bilaterally.  Neck: Trachea midline, no masses, no thyromegaly. No cervical or supraclavicular lymphadenopathy.  Respiratory: Unlabored respiratory effort, lungs clear to auscultation, no wheezes, no ronchi.  Cardiovascular: Regular rate and rhythm, no murmurs appreciated, no lower extremity edema.  Abdomen: Soft, mild diffuse tenderness to palpation over bilateral lower quadrants, no masses, no hepatosplenomegaly.  Psych: Alert and oriented x3, normal affect and mood.  MSK: changes consistent with osteoarthritis of hands, no swelling/tenosynovitis evident.  Positive tinnel and phalen test on right.  " 5/5  strength bilaterally.  Knees without effusion, tenderness to palpation over medial and lateral joint lines.      Assessment and Plan:   The following treatment plan was discussed    1. Recurrent UTI  Patient describes what sounds like pneumaturia several weeks ago.  Currently on cipro.  She would benefit from seeing urology, referral placed today.  - REFERRAL TO UROLOGY    2. Carpal tunnel syndrome of right wrist  Has failed conservative therapy with bracing, referral to ortho for definitive management  - REFERRAL TO ORTHOPEDICS    3. SYBIL (obstructive sleep apnea)  Cont care through pulmonology    4. Cyst of ovary, unspecified laterality  With pelvic pain, pt requesting gyn referral which I placed today  - REFERRAL TO GYNECOLOGY    5. Drug-induced constipation  Follows with GI who suspects she may also have gastroparesis.  Continues on miralax PRN  - polyethylene glycol 3350 (MIRALAX) Powder; Take 17 g by mouth every day.; Refill: 3    6. Type 1 diabetes mellitus with complication (HCC)  Follows with endocrine.  Cont current meds  - Insulin Glargine (TOUJEO SOLOSTAR) 300 UNIT/ML Solution Pen-injector; Inject 28 Units as instructed every bedtime.  - insulin lispro (HUMALOG) 100 UNIT/ML Solution; Inject 1-5 Units as instructed 3 times a day before meals. Sliding scale    7. Anxiety  We discussed the dangers of benzos and opiates.  I am not prescribing either.  We discussed importance of minimizing valium use/weaning off.  - diazepam (VALIUM) 10 MG tablet; Take 1 Tab by mouth every 6 hours as needed for Anxiety for up to 30 days.  Dispense: 120 Tab; Refill: 0    8. Rheumatoid arthritis of hand, unspecified laterality, unspecified rheumatoid factor presence (HCC)  Unclear if this is an accurate diagnosis.  Patient has not been treated for 19 years.  Will obtain initial work up, refer to rheum if needed  - RHEUMATOID ARTHRITIS FACTOR; Future  - CCP ANTIBODY; Future  - WESTERGREN SED RATE; Future  - CRP  QUANTITIVE (NON-CARDIAC); Future  - NABEEL ANTIBODY WITH REFLEX; Future  - DX-JOINT SURVEY-HANDS SINGLE VIEW; Future  - DX-JOINT SURVEY-FEET SINGLE VIEW; Future    9. Chronic pain of both knees  Will start with x rays to eval for OA.  - DX-KNEE 3 VIEWS LEFT; Future  - DX-KNEE 3 VIEWS RIGHT; Future    10. Muscle spasm  Discussed dangerous combination of soma, opiates, benzos.  I recommended she wean off/stop the soma as it is not effective.  I am not prescribing it.  - cyclobenzaprine (FLEXERIL) 10 MG Tab; Take 1 Tab by mouth 2 times a day as needed.  - carisoprodol (SOMA) 350 MG Tab; Take 1 Tab by mouth 4 times a day for 30 days.  Dispense: 120 Tab; Refill: 0    11. Painful diabetic neuropathy (HCC)  Patient states it is controlled with the regimen below.  I find this to be excessive/dangerous and I have informed patient of such.  Scripts come from her endocrinologist.  - gabapentin (NEURONTIN) 600 MG tablet; Take 2 Tabs by mouth 2 times a day.  - calcitRIOL (ROCALTROL) 0.25 MCG Cap; Take 1 Cap by mouth every day.  Dispense: 30 Cap  - HYDROcodone/acetaminophen (NORCO)  MG Tab; Take 2 Tabs by mouth 4 times a day for 30 days.  Dispense: 280 Tab; Refill: 0   -Zohydro 50 mg BID    12. Insomnia, unspecified type  Pt reports improvement.  She uses clonidine or belsomra PRN which she gets from her endocrinologist.  -belsomra PRN  - cloNIDine (CATAPRES) 0.1 MG Tab; Take 1 Tab by mouth at bedtime as needed (insomnia).        Followup: Return in about 4 weeks (around 6/20/2018), or if symptoms worsen or fail to improve, for labs, imaging.

## 2018-05-24 NOTE — ASSESSMENT & PLAN NOTE
Reports frequent/recurrent UTI's.  States that she currently has a UTI but is on cipro 500 mg BID for treatment.  States she experienced air in her urine with severe pain several weeks ago.  Went to the ER for this but we do not have the records.  Denies fevers, chills.  Requesting referral to urology.

## 2018-05-24 NOTE — ASSESSMENT & PLAN NOTE
Currently takes both flexeril and soma.  States she was unaware they were both muscle relaxers.  States the soma is not very effective but the flexeril is helpful.  Soma is prescribed by Dr. Pathak, #120 a month.

## 2018-05-24 NOTE — ASSESSMENT & PLAN NOTE
Reports a history of RA which was diagnosed in 1999 after she had her last child.  Has never been treated or under the care of a rheumatologist.  States she was RF negative in the past but the diagnosis was made based on hand and foot x rays.  Last time these were done was over 10 years ago.  Complains of pain in both hands, stiffness in the mornings that improves throughout the day but she is never able to fully make a fist.  Denies pain in other joints.

## 2018-05-24 NOTE — ASSESSMENT & PLAN NOTE
Reports severe constipation.  Is on miralax prn but does not take it often.  Follows with Dr. Centeno of GI.  States she had a colonoscopy 1 year ago.

## 2018-05-24 NOTE — ASSESSMENT & PLAN NOTE
Reports recently being diagnosed with ovarian cysts during an ER visit for abdominal pain.  In her [facility] records, she has a pelvic US from 11/17 which did not show cysts.  We do not have the records from the diagnostic ER visit.  Reports bilateral daily pelvic pain.  Stopped having periods after she gave birth to her daughter in 1999.  Is requesting referral to gyn.

## 2018-05-24 NOTE — ASSESSMENT & PLAN NOTE
Reports she has insulin induced neuropathy.  She has seen neurology in the past (Dr. Win) who felt this was diabetic neuropathy and did not agree with the insulin induced neuropathy diagnosis.  She takes large amounts of opiates for this, which are prescribed by her endocrinologist.  Currently on extended release norco 50 mg BID and short acting norco 10/325 #240 a month.  Also takes calcitriol 0.25 mg 2 days per week for the neuropathy under the direction of her endocrinologist.   No

## 2018-05-24 NOTE — ASSESSMENT & PLAN NOTE
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  She is still receiving Belsomra from Dr. Pathak although states she rarely takes it.  Despite this, she gets it refilled every month.

## 2018-05-24 NOTE — ASSESSMENT & PLAN NOTE
Complains of bilateral knee pain which she has had for many years.  Told in the past this was from her RA.  Does not want surgery at this point if it were indicated.  Pain is daily, aching, throughout the entire knee on both sides.  Denies swelling.

## 2018-05-24 NOTE — ASSESSMENT & PLAN NOTE
Reports severe carpal tunnel in the right wrist.  Wears a wrist brace most of the time which alleviates the pain.  Worst at night if she does not have the brace on.  Reports numbness/tingling in fingers of right hand, hand weakness.  Requesting orthopedic referral.

## 2018-06-06 ENCOUNTER — APPOINTMENT (OUTPATIENT)
Dept: RADIOLOGY | Facility: MEDICAL CENTER | Age: 53
DRG: 871 | End: 2018-06-06
Attending: INTERNAL MEDICINE
Payer: MEDICAID

## 2018-06-06 ENCOUNTER — APPOINTMENT (OUTPATIENT)
Dept: RADIOLOGY | Facility: MEDICAL CENTER | Age: 53
DRG: 871 | End: 2018-06-06
Attending: EMERGENCY MEDICINE
Payer: MEDICAID

## 2018-06-06 ENCOUNTER — HOSPITAL ENCOUNTER (INPATIENT)
Facility: MEDICAL CENTER | Age: 53
LOS: 3 days | DRG: 871 | End: 2018-06-09
Attending: EMERGENCY MEDICINE | Admitting: INTERNAL MEDICINE
Payer: MEDICAID

## 2018-06-06 DIAGNOSIS — R50.9 FEVER, UNSPECIFIED FEVER CAUSE: ICD-10-CM

## 2018-06-06 DIAGNOSIS — E10.8 TYPE 1 DIABETES MELLITUS WITH COMPLICATION (HCC): ICD-10-CM

## 2018-06-06 DIAGNOSIS — R07.9 ACUTE CHEST PAIN: ICD-10-CM

## 2018-06-06 DIAGNOSIS — F11.20 NARCOTIC DEPENDENCE (HCC): ICD-10-CM

## 2018-06-06 PROBLEM — R00.0 TACHYCARDIA: Status: ACTIVE | Noted: 2018-06-06

## 2018-06-06 PROBLEM — A41.9 SEPSIS (HCC): Status: ACTIVE | Noted: 2018-06-06

## 2018-06-06 PROBLEM — R65.10 SIRS (SYSTEMIC INFLAMMATORY RESPONSE SYNDROME) (HCC): Status: ACTIVE | Noted: 2018-06-06

## 2018-06-06 LAB
ALBUMIN SERPL BCP-MCNC: 4 G/DL (ref 3.2–4.9)
ALBUMIN/GLOB SERPL: 1.4 G/DL
ALP SERPL-CCNC: 165 U/L (ref 30–99)
ALT SERPL-CCNC: 38 U/L (ref 2–50)
ANION GAP SERPL CALC-SCNC: 7 MMOL/L (ref 0–11.9)
APPEARANCE UR: CLEAR
APTT PPP: 28.7 SEC (ref 24.7–36)
AST SERPL-CCNC: 30 U/L (ref 12–45)
BACTERIA #/AREA URNS HPF: ABNORMAL /HPF
BASOPHILS # BLD AUTO: 0.5 % (ref 0–1.8)
BASOPHILS # BLD: 0.06 K/UL (ref 0–0.12)
BILIRUB SERPL-MCNC: 0.3 MG/DL (ref 0.1–1.5)
BILIRUB UR QL STRIP.AUTO: NEGATIVE
BNP SERPL-MCNC: 27 PG/ML (ref 0–100)
BUN SERPL-MCNC: 22 MG/DL (ref 8–22)
CALCIUM SERPL-MCNC: 9.3 MG/DL (ref 8.5–10.5)
CHLORIDE SERPL-SCNC: 104 MMOL/L (ref 96–112)
CO2 SERPL-SCNC: 28 MMOL/L (ref 20–33)
COLOR UR: ABNORMAL
CREAT SERPL-MCNC: 1.16 MG/DL (ref 0.5–1.4)
DEPRECATED D DIMER PPP IA-ACNC: <200 NG/ML(D-DU)
EKG IMPRESSION: NORMAL
EKG IMPRESSION: NORMAL
EOSINOPHIL # BLD AUTO: 0.24 K/UL (ref 0–0.51)
EOSINOPHIL NFR BLD: 2.2 % (ref 0–6.9)
ERYTHROCYTE [DISTWIDTH] IN BLOOD BY AUTOMATED COUNT: 45.5 FL (ref 35.9–50)
EST. AVERAGE GLUCOSE BLD GHB EST-MCNC: 189 MG/DL
GLOBULIN SER CALC-MCNC: 2.8 G/DL (ref 1.9–3.5)
GLUCOSE BLD-MCNC: 134 MG/DL (ref 65–99)
GLUCOSE BLD-MCNC: 149 MG/DL (ref 65–99)
GLUCOSE BLD-MCNC: 177 MG/DL (ref 65–99)
GLUCOSE SERPL-MCNC: 171 MG/DL (ref 65–99)
GLUCOSE UR STRIP.AUTO-MCNC: NEGATIVE MG/DL
HBA1C MFR BLD: 8.2 % (ref 0–5.6)
HCT VFR BLD AUTO: 35.9 % (ref 37–47)
HGB BLD-MCNC: 12 G/DL (ref 12–16)
IMM GRANULOCYTES # BLD AUTO: 0.04 K/UL (ref 0–0.11)
IMM GRANULOCYTES NFR BLD AUTO: 0.4 % (ref 0–0.9)
INR PPP: 0.99 (ref 0.87–1.13)
KETONES UR STRIP.AUTO-MCNC: NEGATIVE MG/DL
LACTATE BLD-SCNC: 1.7 MMOL/L (ref 0.5–2)
LEUKOCYTE ESTERASE UR QL STRIP.AUTO: NEGATIVE
LIPASE SERPL-CCNC: <3 U/L (ref 11–82)
LYMPHOCYTES # BLD AUTO: 1.19 K/UL (ref 1–4.8)
LYMPHOCYTES NFR BLD: 10.7 % (ref 22–41)
MCH RBC QN AUTO: 32.3 PG (ref 27–33)
MCHC RBC AUTO-ENTMCNC: 33.4 G/DL (ref 33.6–35)
MCV RBC AUTO: 96.5 FL (ref 81.4–97.8)
MICRO URNS: ABNORMAL
MONOCYTES # BLD AUTO: 0.66 K/UL (ref 0–0.85)
MONOCYTES NFR BLD AUTO: 5.9 % (ref 0–13.4)
NEUTROPHILS # BLD AUTO: 8.95 K/UL (ref 2–7.15)
NEUTROPHILS NFR BLD: 80.3 % (ref 44–72)
NITRITE UR QL STRIP.AUTO: NEGATIVE
NRBC # BLD AUTO: 0 K/UL
NRBC BLD-RTO: 0 /100 WBC
PH UR STRIP.AUTO: 5.5 [PH]
PLATELET # BLD AUTO: 222 K/UL (ref 164–446)
PMV BLD AUTO: 11.7 FL (ref 9–12.9)
POTASSIUM SERPL-SCNC: 4.2 MMOL/L (ref 3.6–5.5)
PROCALCITONIN SERPL-MCNC: <0.05 NG/ML
PROT SERPL-MCNC: 6.8 G/DL (ref 6–8.2)
PROT UR QL STRIP: 100 MG/DL
PROTHROMBIN TIME: 12.8 SEC (ref 12–14.6)
RBC # BLD AUTO: 3.72 M/UL (ref 4.2–5.4)
RBC UR QL AUTO: ABNORMAL
RENAL EPI CELLS #/AREA URNS HPF: ABNORMAL /HPF
SODIUM SERPL-SCNC: 139 MMOL/L (ref 135–145)
SP GR UR REFRACTOMETRY: 1.01
SP GR UR STRIP.AUTO: 1.01
TROPONIN I SERPL-MCNC: <0.01 NG/ML (ref 0–0.04)
TSH SERPL DL<=0.005 MIU/L-ACNC: 2.76 UIU/ML (ref 0.38–5.33)
UROBILINOGEN UR STRIP.AUTO-MCNC: 0.2 MG/DL
WBC # BLD AUTO: 11.1 K/UL (ref 4.8–10.8)
WBC #/AREA URNS HPF: ABNORMAL /HPF

## 2018-06-06 PROCEDURE — 94760 N-INVAS EAR/PLS OXIMETRY 1: CPT

## 2018-06-06 PROCEDURE — 93010 ELECTROCARDIOGRAM REPORT: CPT | Performed by: INTERNAL MEDICINE

## 2018-06-06 PROCEDURE — 96361 HYDRATE IV INFUSION ADD-ON: CPT

## 2018-06-06 PROCEDURE — A9270 NON-COVERED ITEM OR SERVICE: HCPCS | Performed by: HOSPITALIST

## 2018-06-06 PROCEDURE — 93005 ELECTROCARDIOGRAM TRACING: CPT

## 2018-06-06 PROCEDURE — 93005 ELECTROCARDIOGRAM TRACING: CPT | Performed by: EMERGENCY MEDICINE

## 2018-06-06 PROCEDURE — 87086 URINE CULTURE/COLONY COUNT: CPT

## 2018-06-06 PROCEDURE — 80053 COMPREHEN METABOLIC PANEL: CPT

## 2018-06-06 PROCEDURE — 700111 HCHG RX REV CODE 636 W/ 250 OVERRIDE (IP): Performed by: EMERGENCY MEDICINE

## 2018-06-06 PROCEDURE — 81001 URINALYSIS AUTO W/SCOPE: CPT

## 2018-06-06 PROCEDURE — A9270 NON-COVERED ITEM OR SERVICE: HCPCS | Performed by: EMERGENCY MEDICINE

## 2018-06-06 PROCEDURE — 85610 PROTHROMBIN TIME: CPT

## 2018-06-06 PROCEDURE — 700105 HCHG RX REV CODE 258: Performed by: HOSPITALIST

## 2018-06-06 PROCEDURE — 71275 CT ANGIOGRAPHY CHEST: CPT

## 2018-06-06 PROCEDURE — 96367 TX/PROPH/DG ADDL SEQ IV INF: CPT

## 2018-06-06 PROCEDURE — 93005 ELECTROCARDIOGRAM TRACING: CPT | Performed by: HOSPITALIST

## 2018-06-06 PROCEDURE — 700105 HCHG RX REV CODE 258: Performed by: EMERGENCY MEDICINE

## 2018-06-06 PROCEDURE — 84145 PROCALCITONIN (PCT): CPT

## 2018-06-06 PROCEDURE — 36415 COLL VENOUS BLD VENIPUNCTURE: CPT

## 2018-06-06 PROCEDURE — 83036 HEMOGLOBIN GLYCOSYLATED A1C: CPT

## 2018-06-06 PROCEDURE — 83880 ASSAY OF NATRIURETIC PEPTIDE: CPT

## 2018-06-06 PROCEDURE — 700102 HCHG RX REV CODE 250 W/ 637 OVERRIDE(OP): Performed by: EMERGENCY MEDICINE

## 2018-06-06 PROCEDURE — 85379 FIBRIN DEGRADATION QUANT: CPT

## 2018-06-06 PROCEDURE — 84443 ASSAY THYROID STIM HORMONE: CPT

## 2018-06-06 PROCEDURE — 770020 HCHG ROOM/CARE - TELE (206)

## 2018-06-06 PROCEDURE — 700102 HCHG RX REV CODE 250 W/ 637 OVERRIDE(OP): Performed by: HOSPITALIST

## 2018-06-06 PROCEDURE — 96365 THER/PROPH/DIAG IV INF INIT: CPT

## 2018-06-06 PROCEDURE — 84484 ASSAY OF TROPONIN QUANT: CPT

## 2018-06-06 PROCEDURE — 82962 GLUCOSE BLOOD TEST: CPT

## 2018-06-06 PROCEDURE — 71045 X-RAY EXAM CHEST 1 VIEW: CPT

## 2018-06-06 PROCEDURE — 99220 PR INITIAL OBSERVATION CARE,LEVL III: CPT | Performed by: HOSPITALIST

## 2018-06-06 PROCEDURE — 700111 HCHG RX REV CODE 636 W/ 250 OVERRIDE (IP): Performed by: HOSPITALIST

## 2018-06-06 PROCEDURE — 96375 TX/PRO/DX INJ NEW DRUG ADDON: CPT

## 2018-06-06 PROCEDURE — 85025 COMPLETE CBC W/AUTO DIFF WBC: CPT

## 2018-06-06 PROCEDURE — 99285 EMERGENCY DEPT VISIT HI MDM: CPT

## 2018-06-06 PROCEDURE — 83605 ASSAY OF LACTIC ACID: CPT

## 2018-06-06 PROCEDURE — 83690 ASSAY OF LIPASE: CPT

## 2018-06-06 PROCEDURE — 74177 CT ABD & PELVIS W/CONTRAST: CPT

## 2018-06-06 PROCEDURE — 700117 HCHG RX CONTRAST REV CODE 255: Performed by: INTERNAL MEDICINE

## 2018-06-06 PROCEDURE — 85730 THROMBOPLASTIN TIME PARTIAL: CPT

## 2018-06-06 PROCEDURE — 87040 BLOOD CULTURE FOR BACTERIA: CPT | Mod: 91

## 2018-06-06 PROCEDURE — 99233 SBSQ HOSP IP/OBS HIGH 50: CPT | Performed by: INTERNAL MEDICINE

## 2018-06-06 RX ORDER — DIAZEPAM 5 MG/1
10 TABLET ORAL EVERY 6 HOURS PRN
Status: DISCONTINUED | OUTPATIENT
Start: 2018-06-06 | End: 2018-06-09 | Stop reason: HOSPADM

## 2018-06-06 RX ORDER — OXYCODONE HYDROCHLORIDE 5 MG/1
5 TABLET ORAL
Status: DISCONTINUED | OUTPATIENT
Start: 2018-06-06 | End: 2018-06-06

## 2018-06-06 RX ORDER — INSULIN GLARGINE 100 [IU]/ML
22 INJECTION, SOLUTION SUBCUTANEOUS EVERY EVENING
Status: DISCONTINUED | OUTPATIENT
Start: 2018-06-06 | End: 2018-06-09 | Stop reason: HOSPADM

## 2018-06-06 RX ORDER — ACETAMINOPHEN 500 MG
500 TABLET ORAL EVERY 6 HOURS PRN
Status: DISCONTINUED | OUTPATIENT
Start: 2018-06-06 | End: 2018-06-09 | Stop reason: HOSPADM

## 2018-06-06 RX ORDER — OXYCODONE HYDROCHLORIDE 5 MG/1
5 TABLET ORAL
Status: DISCONTINUED | OUTPATIENT
Start: 2018-06-06 | End: 2018-06-09 | Stop reason: HOSPADM

## 2018-06-06 RX ORDER — HYDROCODONE BITARTRATE AND ACETAMINOPHEN 10; 325 MG/1; MG/1
2 TABLET ORAL EVERY 6 HOURS PRN
COMMUNITY

## 2018-06-06 RX ORDER — SODIUM CHLORIDE 9 MG/ML
INJECTION, SOLUTION INTRAVENOUS CONTINUOUS
Status: DISCONTINUED | OUTPATIENT
Start: 2018-06-06 | End: 2018-06-06

## 2018-06-06 RX ORDER — HYDROMORPHONE HYDROCHLORIDE 2 MG/ML
0.5 INJECTION, SOLUTION INTRAMUSCULAR; INTRAVENOUS; SUBCUTANEOUS
Status: DISCONTINUED | OUTPATIENT
Start: 2018-06-06 | End: 2018-06-09 | Stop reason: HOSPADM

## 2018-06-06 RX ORDER — SODIUM CHLORIDE 9 MG/ML
500 INJECTION, SOLUTION INTRAVENOUS
Status: COMPLETED | OUTPATIENT
Start: 2018-06-06 | End: 2018-06-06

## 2018-06-06 RX ORDER — AZITHROMYCIN 500 MG/1
500 INJECTION, POWDER, LYOPHILIZED, FOR SOLUTION INTRAVENOUS ONCE
Status: COMPLETED | OUTPATIENT
Start: 2018-06-06 | End: 2018-06-06

## 2018-06-06 RX ORDER — ONDANSETRON 2 MG/ML
4 INJECTION INTRAMUSCULAR; INTRAVENOUS EVERY 4 HOURS PRN
Status: DISCONTINUED | OUTPATIENT
Start: 2018-06-06 | End: 2018-06-09 | Stop reason: HOSPADM

## 2018-06-06 RX ORDER — SODIUM CHLORIDE 9 MG/ML
500 INJECTION, SOLUTION INTRAVENOUS
Status: DISCONTINUED | OUTPATIENT
Start: 2018-06-06 | End: 2018-06-09 | Stop reason: HOSPADM

## 2018-06-06 RX ORDER — ASPIRIN 81 MG/1
324 TABLET, CHEWABLE ORAL DAILY
Status: DISCONTINUED | OUTPATIENT
Start: 2018-06-06 | End: 2018-06-09 | Stop reason: HOSPADM

## 2018-06-06 RX ORDER — BISACODYL 10 MG
10 SUPPOSITORY, RECTAL RECTAL
Status: DISCONTINUED | OUTPATIENT
Start: 2018-06-06 | End: 2018-06-09 | Stop reason: HOSPADM

## 2018-06-06 RX ORDER — CALCITRIOL 0.25 UG/1
0.25 CAPSULE, LIQUID FILLED ORAL
Status: DISCONTINUED | OUTPATIENT
Start: 2018-06-07 | End: 2018-06-09 | Stop reason: HOSPADM

## 2018-06-06 RX ORDER — HYDROMORPHONE HYDROCHLORIDE 2 MG/ML
1 INJECTION, SOLUTION INTRAMUSCULAR; INTRAVENOUS; SUBCUTANEOUS ONCE
Status: COMPLETED | OUTPATIENT
Start: 2018-06-06 | End: 2018-06-06

## 2018-06-06 RX ORDER — SODIUM CHLORIDE 9 MG/ML
30 INJECTION, SOLUTION INTRAVENOUS
Status: DISCONTINUED | OUTPATIENT
Start: 2018-06-06 | End: 2018-06-06

## 2018-06-06 RX ORDER — PROMETHAZINE HYDROCHLORIDE 25 MG/1
12.5-25 TABLET ORAL EVERY 4 HOURS PRN
Status: DISCONTINUED | OUTPATIENT
Start: 2018-06-06 | End: 2018-06-09 | Stop reason: HOSPADM

## 2018-06-06 RX ORDER — SODIUM CHLORIDE 9 MG/ML
INJECTION, SOLUTION INTRAVENOUS CONTINUOUS
Status: DISCONTINUED | OUTPATIENT
Start: 2018-06-06 | End: 2018-06-09 | Stop reason: HOSPADM

## 2018-06-06 RX ORDER — GABAPENTIN 600 MG/1
600-1200 TABLET ORAL
COMMUNITY

## 2018-06-06 RX ORDER — GABAPENTIN 300 MG/1
600 CAPSULE ORAL 2 TIMES DAILY PRN
Status: DISCONTINUED | OUTPATIENT
Start: 2018-06-06 | End: 2018-06-09 | Stop reason: HOSPADM

## 2018-06-06 RX ORDER — PROMETHAZINE HYDROCHLORIDE 25 MG/1
12.5-25 SUPPOSITORY RECTAL EVERY 4 HOURS PRN
Status: DISCONTINUED | OUTPATIENT
Start: 2018-06-06 | End: 2018-06-09 | Stop reason: HOSPADM

## 2018-06-06 RX ORDER — CALCITRIOL 0.25 UG/1
0.25 CAPSULE, LIQUID FILLED ORAL
COMMUNITY
End: 2022-08-18

## 2018-06-06 RX ORDER — OXYCODONE HYDROCHLORIDE 10 MG/1
10 TABLET ORAL
Status: DISCONTINUED | OUTPATIENT
Start: 2018-06-06 | End: 2018-06-06

## 2018-06-06 RX ORDER — CYCLOBENZAPRINE HCL 10 MG
10 TABLET ORAL 3 TIMES DAILY PRN
Status: ON HOLD | COMMUNITY
End: 2023-10-01

## 2018-06-06 RX ORDER — CLONIDINE HYDROCHLORIDE 0.1 MG/1
0.1 TABLET ORAL NIGHTLY PRN
Status: DISCONTINUED | OUTPATIENT
Start: 2018-06-06 | End: 2018-06-09 | Stop reason: HOSPADM

## 2018-06-06 RX ORDER — OXYCODONE HYDROCHLORIDE 10 MG/1
10 TABLET ORAL
Status: DISCONTINUED | OUTPATIENT
Start: 2018-06-06 | End: 2018-06-09 | Stop reason: HOSPADM

## 2018-06-06 RX ORDER — ACETAMINOPHEN 325 MG/1
650 TABLET ORAL ONCE
Status: DISCONTINUED | OUTPATIENT
Start: 2018-06-06 | End: 2018-06-06

## 2018-06-06 RX ORDER — ACETAMINOPHEN 325 MG/1
650 TABLET ORAL ONCE
Status: COMPLETED | OUTPATIENT
Start: 2018-06-06 | End: 2018-06-06

## 2018-06-06 RX ORDER — ASPIRIN 300 MG/1
300 SUPPOSITORY RECTAL DAILY
Status: DISCONTINUED | OUTPATIENT
Start: 2018-06-06 | End: 2018-06-09 | Stop reason: HOSPADM

## 2018-06-06 RX ORDER — DEXTROSE MONOHYDRATE 25 G/50ML
25 INJECTION, SOLUTION INTRAVENOUS
Status: DISCONTINUED | OUTPATIENT
Start: 2018-06-06 | End: 2018-06-09 | Stop reason: HOSPADM

## 2018-06-06 RX ORDER — AMPICILLIN AND SULBACTAM 2; 1 G/1; G/1
INJECTION, POWDER, FOR SOLUTION INTRAMUSCULAR; INTRAVENOUS
Status: DISCONTINUED
Start: 2018-06-06 | End: 2018-06-06

## 2018-06-06 RX ORDER — AMOXICILLIN 250 MG
2 CAPSULE ORAL 2 TIMES DAILY
Status: DISCONTINUED | OUTPATIENT
Start: 2018-06-06 | End: 2018-06-09 | Stop reason: HOSPADM

## 2018-06-06 RX ORDER — CARISOPRODOL 350 MG/1
350 TABLET ORAL 4 TIMES DAILY
Status: DISCONTINUED | OUTPATIENT
Start: 2018-06-06 | End: 2018-06-09 | Stop reason: HOSPADM

## 2018-06-06 RX ORDER — ASPIRIN 325 MG
325 TABLET ORAL DAILY
Status: DISCONTINUED | OUTPATIENT
Start: 2018-06-06 | End: 2018-06-09 | Stop reason: HOSPADM

## 2018-06-06 RX ORDER — CIPROFLOXACIN 500 MG/1
500 TABLET, FILM COATED ORAL 2 TIMES DAILY
Status: ON HOLD | COMMUNITY
Start: 2018-05-16 | End: 2018-06-09

## 2018-06-06 RX ORDER — POLYETHYLENE GLYCOL 3350 17 G/17G
1 POWDER, FOR SOLUTION ORAL
Status: DISCONTINUED | OUTPATIENT
Start: 2018-06-06 | End: 2018-06-09 | Stop reason: HOSPADM

## 2018-06-06 RX ORDER — ONDANSETRON 4 MG/1
4 TABLET, ORALLY DISINTEGRATING ORAL EVERY 4 HOURS PRN
Status: DISCONTINUED | OUTPATIENT
Start: 2018-06-06 | End: 2018-06-09 | Stop reason: HOSPADM

## 2018-06-06 RX ORDER — ONDANSETRON 4 MG/1
4 TABLET, ORALLY DISINTEGRATING ORAL ONCE
Status: COMPLETED | OUTPATIENT
Start: 2018-06-06 | End: 2018-06-06

## 2018-06-06 RX ORDER — HEPARIN SODIUM 5000 [USP'U]/ML
5000 INJECTION, SOLUTION INTRAVENOUS; SUBCUTANEOUS EVERY 8 HOURS
Status: DISCONTINUED | OUTPATIENT
Start: 2018-06-06 | End: 2018-06-09 | Stop reason: HOSPADM

## 2018-06-06 RX ORDER — SODIUM CHLORIDE 9 MG/ML
30 INJECTION, SOLUTION INTRAVENOUS ONCE
Status: COMPLETED | OUTPATIENT
Start: 2018-06-06 | End: 2018-06-06

## 2018-06-06 RX ADMIN — HYDROMORPHONE HYDROCHLORIDE 1 MG: 2 INJECTION INTRAMUSCULAR; INTRAVENOUS; SUBCUTANEOUS at 05:37

## 2018-06-06 RX ADMIN — CEFTRIAXONE SODIUM 1 G: 10 INJECTION, POWDER, FOR SOLUTION INTRAVENOUS at 08:56

## 2018-06-06 RX ADMIN — OXYCODONE HYDROCHLORIDE 10 MG: 10 TABLET ORAL at 12:38

## 2018-06-06 RX ADMIN — HYDROMORPHONE HYDROCHLORIDE 0.5 MG: 2 INJECTION INTRAMUSCULAR; INTRAVENOUS; SUBCUTANEOUS at 22:44

## 2018-06-06 RX ADMIN — SODIUM CHLORIDE: 9 INJECTION, SOLUTION INTRAVENOUS at 15:54

## 2018-06-06 RX ADMIN — IOHEXOL 100 ML: 350 INJECTION, SOLUTION INTRAVENOUS at 19:51

## 2018-06-06 RX ADMIN — OXYCODONE HYDROCHLORIDE 10 MG: 10 TABLET ORAL at 23:50

## 2018-06-06 RX ADMIN — HEPARIN SODIUM 5000 UNITS: 5000 INJECTION, SOLUTION INTRAVENOUS; SUBCUTANEOUS at 20:34

## 2018-06-06 RX ADMIN — SODIUM CHLORIDE 2040 ML: 9 INJECTION, SOLUTION INTRAVENOUS at 06:24

## 2018-06-06 RX ADMIN — ACETAMINOPHEN 650 MG: 325 TABLET, FILM COATED ORAL at 07:03

## 2018-06-06 RX ADMIN — SODIUM CHLORIDE 3 G: 900 INJECTION INTRAVENOUS at 06:32

## 2018-06-06 RX ADMIN — AZITHROMYCIN MONOHYDRATE 500 MG: 500 INJECTION, POWDER, LYOPHILIZED, FOR SOLUTION INTRAVENOUS at 08:56

## 2018-06-06 RX ADMIN — CARISOPRODOL 350 MG: 350 TABLET ORAL at 10:42

## 2018-06-06 RX ADMIN — INSULIN HUMAN 1 UNITS: 100 INJECTION, SOLUTION PARENTERAL at 20:33

## 2018-06-06 RX ADMIN — ONDANSETRON 4 MG: 4 TABLET, ORALLY DISINTEGRATING ORAL at 05:37

## 2018-06-06 RX ADMIN — OXYCODONE HYDROCHLORIDE 10 MG: 10 TABLET ORAL at 20:34

## 2018-06-06 RX ADMIN — HEPARIN SODIUM 5000 UNITS: 5000 INJECTION, SOLUTION INTRAVENOUS; SUBCUTANEOUS at 13:52

## 2018-06-06 RX ADMIN — SODIUM CHLORIDE 500 ML: 9 INJECTION, SOLUTION INTRAVENOUS at 10:42

## 2018-06-06 RX ADMIN — SENNOSIDES AND DOCUSATE SODIUM 2 TABLET: 8.6; 5 TABLET ORAL at 18:13

## 2018-06-06 RX ADMIN — HYDROMORPHONE HYDROCHLORIDE 0.5 MG: 10 INJECTION, SOLUTION INTRAMUSCULAR; INTRAVENOUS; SUBCUTANEOUS at 15:53

## 2018-06-06 RX ADMIN — SODIUM CHLORIDE: 9 INJECTION, SOLUTION INTRAVENOUS at 10:42

## 2018-06-06 RX ADMIN — CARISOPRODOL 350 MG: 350 TABLET ORAL at 20:34

## 2018-06-06 RX ADMIN — ASPIRIN 325 MG: 325 TABLET ORAL at 10:42

## 2018-06-06 RX ADMIN — CARISOPRODOL 350 MG: 350 TABLET ORAL at 16:42

## 2018-06-06 RX ADMIN — ACETAMINOPHEN 500 MG: 500 TABLET ORAL at 16:42

## 2018-06-06 ASSESSMENT — PAIN SCALES - GENERAL
PAINLEVEL_OUTOF10: 8
PAINLEVEL_OUTOF10: 8
PAINLEVEL_OUTOF10: 7
PAINLEVEL_OUTOF10: 8
PAINLEVEL_OUTOF10: 8
PAINLEVEL_OUTOF10: 5
PAINLEVEL_OUTOF10: 8
PAINLEVEL_OUTOF10: 7

## 2018-06-06 ASSESSMENT — LIFESTYLE VARIABLES
DO YOU DRINK ALCOHOL: NO
SUBSTANCE_ABUSE: 0
EVER_SMOKED: YES

## 2018-06-06 ASSESSMENT — COPD QUESTIONNAIRES
DURING THE PAST 4 WEEKS HOW MUCH DID YOU FEEL SHORT OF BREATH: SOME OF THE TIME
COPD SCREENING SCORE: 4
DO YOU EVER COUGH UP ANY MUCUS OR PHLEGM?: NO/ONLY WITH OCCASIONAL COLDS OR INFECTIONS
HAVE YOU SMOKED AT LEAST 100 CIGARETTES IN YOUR ENTIRE LIFE: YES

## 2018-06-06 ASSESSMENT — ENCOUNTER SYMPTOMS
HEARTBURN: 0
HALLUCINATIONS: 0
CHILLS: 1
TREMORS: 0
LOSS OF CONSCIOUSNESS: 0
COUGH: 1
SEIZURES: 0
DIARRHEA: 0
SENSORY CHANGE: 0
EYE PAIN: 0
BLOOD IN STOOL: 0
PALPITATIONS: 0
SHORTNESS OF BREATH: 1
FOCAL WEAKNESS: 0
HEMOPTYSIS: 0
BLURRED VISION: 0
MYALGIAS: 0
FALLS: 0
HEADACHES: 0
VOMITING: 0
ABDOMINAL PAIN: 0
WEAKNESS: 0
NERVOUS/ANXIOUS: 0
DIZZINESS: 0
CONSTIPATION: 0
DEPRESSION: 0
FEVER: 1
DOUBLE VISION: 0
BRUISES/BLEEDS EASILY: 0
NAUSEA: 1
WEAKNESS: 1
INSOMNIA: 0
COUGH: 0
WHEEZING: 0
FLANK PAIN: 0
EYE DISCHARGE: 0
SPEECH CHANGE: 0
NAUSEA: 0
EYE REDNESS: 0

## 2018-06-06 ASSESSMENT — PATIENT HEALTH QUESTIONNAIRE - PHQ9
SUM OF ALL RESPONSES TO PHQ9 QUESTIONS 1 AND 2: 0
1. LITTLE INTEREST OR PLEASURE IN DOING THINGS: NOT AT ALL
2. FEELING DOWN, DEPRESSED, IRRITABLE, OR HOPELESS: NOT AT ALL

## 2018-06-06 NOTE — H&P
Hospital Medicine History and Physical    Date of Service  6/6/2018    Chief Complaint  Chief Complaint   Patient presents with   • Chest Pain   • Shortness of Breath       History of Presenting Illness  52 y.o. female who presented 6/6/2018 with Chest Pain and Shortness of Breath  Originally admitted by Dr. Olivares as an obs for chest pain. Please see his evaluation.    I was called by the S6 nurse at 4PM that she arrived to the medical floor at noon from the ED. She is tachycardic, febrile. Normotensive. EKG sinus. CXR clear. Echo pending. Mild leukocytosis. Trop x 1 negative. Procalcitonin and lactic acid negative. U/A not suggestive of UTI. On review of the chart she is being worked up for chest pain.     I saw her and reinterviewed her. Her daughter was at bedside. Basically she complains of 10/10 chest pain that occurred this morning with fever and chills. Chest pain radiated to back. Constant. She was short of breath. Pleuritic. Somewhat reproducible by palpation. No nausea. 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 She said she has some dysuria.  She denied sick contacts or recent travel. She only started coughing in the morning but it is nonproductive. She denies rash.  She has no cardiac history. She denies COPD or asthma, but admits to SYBIL and smoking. She has diabetes. She has family history of heart disease.  When I saw her on the medical floor, she was still having chest pain. Auscultation to me is clear. She seems anxious and pain may be out of proportion to exam. She is on NCO2 O2Sat at 93%.    Primary Care Physician  Renate Gr M.D.    Consultants      Code Status  full    Review of Systems  Review of Systems   Constitutional: Positive for chills, fever and malaise/fatigue.   HENT: Negative for congestion, hearing loss and  nosebleeds.    Eyes: Negative for pain and redness.   Respiratory: Positive for cough and shortness of breath. Negative for hemoptysis and wheezing.    Cardiovascular: Positive for chest pain. Negative for palpitations.   Gastrointestinal: Negative for abdominal pain, blood in stool, constipation, diarrhea, nausea and vomiting.   Genitourinary: Positive for dysuria. Negative for frequency and hematuria.   Musculoskeletal: Negative for falls, joint pain and myalgias.   Skin: Negative for rash.   Neurological: Positive for weakness. Negative for dizziness, tremors, focal weakness, seizures, loss of consciousness and headaches.   Psychiatric/Behavioral: The patient is not nervous/anxious and does not have insomnia.    All other systems reviewed and are negative.       Past Medical History  Past Medical History:   Diagnosis Date   • Arthritis    • Cataract    • COPD (chronic obstructive pulmonary disease) ([facility])    • Diabetes     juvenile, type I   • Diabetic neuropathy ([facility])    • Diabetic retinopathy    • Diabetic retinopathy ([facility])    • Fibromyalgia    • Hypertension    • Migraine    • POTS (postural orthostatic tachycardia syndrome)     treated with hydrocodone   • Recurrent UTI    • Rheumatoid arthritis ([facility])    • Rheumatoid arthritis (HCC)    • Sleep apnea     cpap ordered, doesn't use   • TIA (transient ischemic attack)        Surgical History  Past Surgical History:   Procedure Laterality Date   • GASTROSCOPY WITH BALLOON DILATATION  2/13/2015    Performed by Venancio Aburto M.D. at SURGERY [facility]   • GASTROSCOPY WITH BIOPSY  2/13/2015    Performed by Venancio Aburto M.D. at SURGERY [facility]   • FOREIGN BODY REMOVAL  6/18/2013    Performed by Raz Mari M.D. at SURGERY SAME DAY [facility]   • CHOLECYSTECTOMY     • OTHER      Skin disorder of unknown name   • PRIMARY C SECTION     • TONSILLECTOMY         Medications  No current facility-administered medications on file prior to  encounter.      Current Outpatient Prescriptions on File Prior to Encounter   Medication Sig Dispense Refill   • Insulin Glargine (TOUJEO SOLOSTAR) 300 UNIT/ML Solution Pen-injector Inject 28 Units as instructed every bedtime.     • insulin lispro (HUMALOG) 100 UNIT/ML Solution Inject 1-5 Units as instructed 3 times a day before meals. Sliding scale     • diazepam (VALIUM) 10 MG tablet Take 1 Tab by mouth every 6 hours as needed for Anxiety for up to 30 days. 120 Tab 0   • cloNIDine (CATAPRES) 0.1 MG Tab Take 1 Tab by mouth at bedtime as needed (insomnia).     • carisoprodol (SOMA) 350 MG Tab Take 1 Tab by mouth 4 times a day for 30 days. 120 Tab 0   • HYDROcodone Bitartrate 50 MG Capsule Extended Release 12 hour Abuse-Deterrent Take 1 Tab by mouth every 12 hours. For Breakthrough pain     • Suvorexant (BELSOMRA) 15 MG Tab Take 1 Tab by mouth at bedtime as needed.         Family History  Family History   Problem Relation Age of Onset   • No Known Problems Sister    • No Known Problems Brother    • No Known Problems Brother    • No Known Problems Daughter    • No Known Problems Daughter        Social History  Social History   Substance Use Topics   • Smoking status: Current Every Day Smoker     Packs/day: 0.50     Years: 29.00     Types: Cigarettes   • Smokeless tobacco: Never Used      Comment: 1/2 PPD   • Alcohol use No       Allergies  Allergies   Allergen Reactions   • Compazine      Aggressive behavior    • Sulfa Drugs Itching     Blotchy spots on chest        Physical Exam  Laboratory   Hemodynamics  Temp (24hrs), Av.1 °C (100.5 °F), Min:37.5 °C (99.5 °F), Max:38.7 °C (101.7 °F)   Temperature: 37.9 °C (100.3 °F)  Pulse  Av.9  Min: 87  Max: 112 Heart Rate (Monitored): (!) 107  Blood Pressure: 145/72, NIBP: 109/63      Respiratory      Respiration: 19, Pulse Oximetry: 94 %, O2 Daily Delivery Respiratory : Silicone Nasal Cannula     Work Of Breathing / Effort: Shallow  RUL Breath Sounds: Clear, RML Breath  Sounds: Clear, RLL Breath Sounds: Clear, PARVEEN Breath Sounds: Clear, LLL Breath Sounds: Clear    Physical Exam   Constitutional: She appears well-developed and well-nourished.   HENT:   Head: Normocephalic and atraumatic.   Eyes: Conjunctivae and EOM are normal. No scleral icterus.   Neck: Normal range of motion. Neck supple.   Cardiovascular: Normal rate and regular rhythm.  Exam reveals no gallop and no friction rub.    No murmur heard.  Pulmonary/Chest: Effort normal and breath sounds normal. No respiratory distress. She has no wheezes. She has no rales.   Abdominal: Soft. Bowel sounds are normal. She exhibits no distension. There is no tenderness. There is no rebound and no guarding.   Musculoskeletal: She exhibits tenderness (Chest tenderness, out of proportion to exam). She exhibits no edema.   Neurological: She is alert.   Skin: Skin is warm.   Psychiatric: She has a normal mood and affect. Her behavior is normal.   Anxious       Recent Labs      06/06/18 0457   WBC  11.1*   RBC  3.72*   HEMOGLOBIN  12.0   HEMATOCRIT  35.9*   MCV  96.5   MCH  32.3   MCHC  33.4*   RDW  45.5   PLATELETCT  222   MPV  11.7     Recent Labs      06/06/18 0457   SODIUM  139   POTASSIUM  4.2   CHLORIDE  104   CO2  28   GLUCOSE  171*   BUN  22   CREATININE  1.16   CALCIUM  9.3     Recent Labs      06/06/18 0457   ALTSGPT  38   ASTSGOT  30   ALKPHOSPHAT  165*   TBILIRUBIN  0.3   LIPASE  <3*   GLUCOSE  171*     Recent Labs      06/06/18 0457   APTT  28.7   INR  0.99     Recent Labs      06/06/18 0457   BNPBTYPENAT  27         Lab Results   Component Value Date    TROPONINI <0.01 06/06/2018     Urinalysis:    Lab Results  Component Value Date/Time   SPECGRAVITY 1.014 06/06/2018 0614   SPECGRAVITY 1.014 06/06/2018 0614   GLUCOSEUR Negative 06/06/2018 0614   KETONES Negative 06/06/2018 0614   NITRITE Negative 06/06/2018 0614   WBCURINE Rare 06/06/2018 0614   RBCURINE 5-10 (A) 03/07/2018 0322   BACTERIA Rare (A) 06/06/2018 0614    EPITHELCELL Few 03/07/2018 0322        Imaging  [id number] View    Result Date: 5/18/2018 5/18/2018 4:21 PM HISTORY/REASON FOR EXAM:  Abdominal Pain. TECHNIQUE/EXAM DESCRIPTION AND NUMBER OF VIEWS:  2 view(s) of the abdomen. COMPARISON: None FINDINGS: There are numerous air-filled small bowel loops in the mid abdomen. Scattered fecal material is present in the colon, particularly on the right side. There are air-filled prominent transverse colon loops. Surgical clip is present in the pelvis. Phleboliths are identified in the pelvis. The visible lung bases are clear. Mild degenerative changes are present in the bony structures.     Nonspecific bowel gas pattern with nondilated air-filled mid abdominal small bowel loops.    Dx-chest-limited (1 View)    Result Date: 6/6/2018 6/6/2018 5:24 AM HISTORY/REASON FOR EXAM:  Chest Pain TECHNIQUE/EXAM DESCRIPTION AND NUMBER OF VIEWS: Single portable view of the chest. COMPARISON: 11/26/2017 FINDINGS: No pulmonary infiltrates or consolidations are noted. No pleural effusion. No pneumothorax. Stable cardiopericardial silhouette.     No acute cardiopulmonary abnormalities are identified.     Assessment/Plan     I anticipate this patient will require at least two midnights for appropriate medical management, necessitating inpatient admission.    * Chest pain   Assessment & Plan    CXR clear, procalcitonin negative  Telemetry, trend troponins, echo ordered, ASA, statin, ordered lipid panel.  Ordered telemetry  Ordered follow up troponins  She has risk factors for CAD including smoking, diabetes  She is on O2. Will get CTA chest r/o PE and AD (she says her chest pain radiates from back)  When she is better and trops negative, consider ordering a nuclear stress test           Sepsis (HCC)   Assessment & Plan    Possible sepsis vs flare   Patient also presents with signs and symptoms concerning for sepsis including tachycardia tachypnea febrile with leukocytosis source is unknown  "at this time she could have pulmonary source ordered a pro-calcitonin  Started on IV ceftriaxone  Urinalysis and culture is ordered  Trending lactic  IV fluids  De-escalate therapy as clinically appropriate\"  CXR is clear. Procalcitonin is negative. Lactic negative  Ordered CTA Chest to rule out PE or any early developing pneumonia  She mentions she has a history of \"bladder fisula?\" Obtain CT Ab/P w/o contrast to r/o intraabdominal source  Ordered blood cultures, urinalysis myself.  Continue IV Rocephin for now.        Tachycardia   Assessment & Plan    \"IVFassess response\"  Blood pressures stable  Could be due to fever and sepsis  Getting CTA Chest to r/o PE        POTS (postural orthostatic tachycardia syndrome)- (present on admission)   Assessment & Plan    Noted. Currently blood pressures stable.        Rheumatoid arthritis ([facility])- (present on admission)   Assessment & Plan    ESR, CRP pending  Does not seem to be a flare right now, chest pain from this is atypical        Anxiety- (present on admission)   Assessment & Plan    Reassurance        Carpal tunnel syndrome of right wrist- (present on admission)   Assessment & Plan    Has wrist brace. Continue.        SYBIL (obstructive sleep apnea)- (present on admission)   Assessment & Plan    Not on CPAP or O2. She says she sleeps upright  Order nocturnal oximetry        Type 1 diabetes mellitus with complication ([facility])- (present on admission)   Assessment & Plan    Resume home galargine and ssi   Accu checks           COPD (chronic obstructive pulmonary disease) ([facility])- (present on admission)   Assessment & Plan    Does not seem to be exacerbating  Resp and O2 per protocol        Opiate dependence (CMS-HCC)- (present on admission)   Assessment & Plan    Noted.  Minimize narcotics if groggy or hypotensive.        Tobacco dependence- (present on admission)   Assessment & Plan    \"Needs smoking cessation counseling\"  I personally talked to her about smoking  I spent 5 " minutes, discussing tobacco dependence and cardiac as well as pulmonary risk. Smoking cessation instructions. Code 04632                VTE prophylaxis: Pharmacologic.    I spent 73 minutes, reviewing the chart, notes, vitals, labs, imaging, ordering labs, evaluating Yuki Riddle for assessment, enacting the plan above. 50% of the time was spent in counseling Yuki Riddle and daughter answering questions. Discussed with RN staff. Medical decision making is therefore complex. Time was devoted to counseling and coordinating care including review of records, pertinent lab data and studies, as well as discussing diagnostic evaluation and work up, planned therapeutic interventions and future disposition of care. Where indicated, the assessment and plan reflect discussion of patient with consultants, other healthcare providers, family members, and additional research needed to obtain further information in formulating the plan of care for Yuki Riddle.

## 2018-06-06 NOTE — ASSESSMENT & PLAN NOTE
CXR clear, procalcitonin negative  Telemetry, troponin X 2 negative, Echo pending, ASA, statin, ordered lipid panel.  She has risk factors for CAD including smoking, diabetes  CTA chest negative for PE and aortic dissection  Nuclear stress test pending  Likely musculoskeletal, tender on palpation, patient started on ibuprofen

## 2018-06-06 NOTE — ED TRIAGE NOTES
"Pt presents to the ED via EMS w/ CP that pt states started 30 min PTA. Pt says CP is L sided, constant, \"feels like I have been shot\". + SOB w/ pain. Denies fevers, cough, abd pain. + nausea, no v/d. Denies dizziness/syncope. A/Ox3. Respirations even and slightly labored. Pt appears very anxious.   "

## 2018-06-06 NOTE — CARE PLAN
Problem: Safety  Goal: Will remain free from injury  Outcome: PROGRESSING AS EXPECTED  Safety maintained. Pt ambulatory. Gait steady, balance intact. Pt educated on fall prevention, pt verbalized understanding.

## 2018-06-06 NOTE — PROGRESS NOTES
Dr. De La Torre was notified that patient continues to experience left sided chest pain, the pain is excrutiating when she take deep breaths and lie flat, but relieve after Dilaudid 0.5mg IV, heat and reposition. Dr. De La Torre visited the patient at bedside. Labs and result were reviewed with physician and patient at bedside.  Decided it is appropriate for her to be on tele. Tele order was input by Dr. De La Torre.

## 2018-06-06 NOTE — ED PROVIDER NOTES
"ED Provider Note    Scribed for Chevy Enciso M.D. by Shazia Shahid. 6/6/2018  5:13 AM    Primary care provider: Renate Gr M.D.  Means of arrival: EMS  History obtained from: Patient   History limited by: None     CHIEF COMPLAINT  Chief Complaint   Patient presents with   • Chest Pain   • Shortness of Breath     HPI  Yuki Riddle is a 52 y.o. female who presents to the Emergency Department by ambulance for evaluation of left-sided chest pain which onset 30 minutes prior to arrival. Patient states this pain has been constant since onset and reports \"it feels like someone broke my chest bones\". Patient states this pain woke her from her sleep and it is exacerbated by movement and deep breaths. Other symptoms include shortness of breath and nausea. She denies recent fever, cough, abdominal pain, episodes of vomiting, diarrhea and dizziness. Patient has history of diabetes.    REVIEW OF SYSTEMS  Pertinent positives include chest pain, shortness of breath, nausea.   Pertinent negatives include no  fever, cough, abdominal pain, episodes of vomiting, diarrhea and dizziness.    All other systems reviewed and negative.    C.     PAST MEDICAL HISTORY   has a past medical history of Arthritis; Cataract; COPD (chronic obstructive pulmonary disease) (HCC); Diabetes; Diabetic neuropathy (HCC); Diabetic retinopathy; Diabetic retinopathy (HCC); Fibromyalgia; Hypertension; Migraine; POTS (postural orthostatic tachycardia syndrome); Recurrent UTI; Rheumatoid arthritis (HCC); Rheumatoid arthritis (HCC); Sleep apnea; and TIA (transient ischemic attack).    SURGICAL HISTORY   has a past surgical history that includes tonsillectomy; cholecystectomy; primary c section; other; foreign body removal (6/18/2013); gastroscopy with balloon dilatation (2/13/2015); and gastroscopy with biopsy (2/13/2015).    SOCIAL HISTORY  Social History   Substance Use Topics   • Smoking status: Current Every Day Smoker     " "Packs/day: 0.50     Years: 29.00     Types: Cigarettes   • Smokeless tobacco: Never Used      Comment: 1/2 PPD   • Alcohol use No      History   Drug Use No       FAMILY HISTORY  Family History   Problem Relation Age of Onset   • No Known Problems Sister    • No Known Problems Brother    • No Known Problems Brother    • No Known Problems Daughter    • No Known Problems Daughter        CURRENT MEDICATIONS  Current medications have been reviewed and can be found in the nurse's note.     ALLERGIES  Allergies   Allergen Reactions   • Compazine      Aggressive behavior    • Sulfa Drugs Itching     Blotchy spots on chest       PHYSICAL EXAM  VITAL SIGNS: BP (!) 185/87   Pulse (!) 112   Resp 20   Ht 1.651 m (5' 5\")   Wt 68 kg (150 lb)   LMP 01/01/2002   BMI 24.96 kg/m²     Nursing note and vitals reviewed.  Constitutional: Well-developed and well-nourished. Anxious. Uncomfortable appearing.   HENT: Head is normocephalic and atraumatic. Oropharynx is clear and moist without exudate or erythema.   Eyes: Pupils are equal, round, and reactive to light. Conjunctiva are normal.   Cardiovascular: Tachycardic. Regular rhythm. No murmur heard. Normal radial pulses.   Pulmonary/Chest: Breath sounds normal. No wheezes or rales. No chest wall tenderness.   Abdominal: Soft and non-tender. No distention   Musculoskeletal: Extremities exhibit normal range of motion without edema or tenderness. No calf tenderness or palpable cords.   Neurological: Awake, alert and oriented to person, place, and time. No focal deficits noted.  Skin: Skin is warm and dry. No rash.   Psychiatric: Anxious.     DIAGNOSTIC STUDIES / PROCEDURES    EKG Interpretation  Evaluated by me. See labs below.     LABS  Results for orders placed or performed during the hospital encounter of 06/06/18   Troponin   Result Value Ref Range    Troponin I <0.01 0.00 - 0.04 ng/mL   Btype Natriuretic Peptide   Result Value Ref Range    B Natriuretic Peptide 27 0 - 100 pg/mL "   CBC with Differential   Result Value Ref Range    WBC 11.1 (H) 4.8 - 10.8 K/uL    RBC 3.72 (L) 4.20 - 5.40 M/uL    Hemoglobin 12.0 12.0 - 16.0 g/dL    Hematocrit 35.9 (L) 37.0 - 47.0 %    MCV 96.5 81.4 - 97.8 fL    MCH 32.3 27.0 - 33.0 pg    MCHC 33.4 (L) 33.6 - 35.0 g/dL    RDW 45.5 35.9 - 50.0 fL    Platelet Count 222 164 - 446 K/uL    MPV 11.7 9.0 - 12.9 fL    Neutrophils-Polys 80.30 (H) 44.00 - 72.00 %    Lymphocytes 10.70 (L) 22.00 - 41.00 %    Monocytes 5.90 0.00 - 13.40 %    Eosinophils 2.20 0.00 - 6.90 %    Basophils 0.50 0.00 - 1.80 %    Immature Granulocytes 0.40 0.00 - 0.90 %    Nucleated RBC 0.00 /100 WBC    Neutrophils (Absolute) 8.95 (H) 2.00 - 7.15 K/uL    Lymphs (Absolute) 1.19 1.00 - 4.80 K/uL    Monos (Absolute) 0.66 0.00 - 0.85 K/uL    Eos (Absolute) 0.24 0.00 - 0.51 K/uL    Baso (Absolute) 0.06 0.00 - 0.12 K/uL    Immature Granulocytes (abs) 0.04 0.00 - 0.11 K/uL    NRBC (Absolute) 0.00 K/uL   Complete Metabolic Panel (CMP)   Result Value Ref Range    Sodium 139 135 - 145 mmol/L    Potassium 4.2 3.6 - 5.5 mmol/L    Chloride 104 96 - 112 mmol/L    Co2 28 20 - 33 mmol/L    Anion Gap 7.0 0.0 - 11.9    Glucose 171 (H) 65 - 99 mg/dL    Bun 22 8 - 22 mg/dL    Creatinine 1.16 0.50 - 1.40 mg/dL    Calcium 9.3 8.5 - 10.5 mg/dL    AST(SGOT) 30 12 - 45 U/L    ALT(SGPT) 38 2 - 50 U/L    Alkaline Phosphatase 165 (H) 30 - 99 U/L    Total Bilirubin 0.3 0.1 - 1.5 mg/dL    Albumin 4.0 3.2 - 4.9 g/dL    Total Protein 6.8 6.0 - 8.2 g/dL    Globulin 2.8 1.9 - 3.5 g/dL    A-G Ratio 1.4 g/dL   Prothrombin Time   Result Value Ref Range    PT 12.8 12.0 - 14.6 sec    INR 0.99 0.87 - 1.13   APTT   Result Value Ref Range    APTT 28.7 24.7 - 36.0 sec   Lipase   Result Value Ref Range    Lipase <3 (L) 11 - 82 U/L   D-DIMER   Result Value Ref Range    D-Dimer Screen <200 <250 ng/mL(D-DU)   ESTIMATED GFR   Result Value Ref Range    GFR If  59 (A) >60 mL/min/1.73 m 2    GFR If Non  49 (A) >60  mL/min/1.73 m 2   LACTIC ACID   Result Value Ref Range    Lactic Acid 1.7 0.5 - 2.0 mmol/L   URINALYSIS   Result Value Ref Range    Color Straw     Character Clear     Specific Gravity 1.014 <1.035    Ph 5.5 5.0 - 8.0    Glucose Negative Negative mg/dL    Ketones Negative Negative mg/dL    Protein 100 (A) Negative mg/dL    Bilirubin Negative Negative    Urobilinogen, Urine 0.2 Negative    Nitrite Negative Negative    Leukocyte Esterase Negative Negative    Occult Blood Small (A) Negative    Micro Urine Req Microscopic    PROCALCITONIN   Result Value Ref Range    Procalcitonin <0.05 <0.25 ng/mL   TSH (Thyroid Stimulating Hormone)   Result Value Ref Range    TSH 2.760 0.380 - 5.330 uIU/mL   EKG (ER)   Result Value Ref Range    Report       [facility] Emergency Dept.    Test Date:  2018  Pt Name:    JAMAAL MANZANO             Department: ER  MRN:        [id number]                      Room:       [facility]  Gender:     Female                       Technician: RN  :        [id number]                   Requested By:ER TRIAGE PROTOCOL  Order #:    [id number]                    Reading MD: KAREN BRAVO MD    Measurements  Intervals                                Axis  Rate:       106                          P:          68  RI:         156                          QRS:        56  QRSD:       72                           T:          41  QT:         288  QTc:        383    Interpretive Statements  SINUS TACHYCARDIA  Compared to ECG 2017 18:08:51  Sinus rhythm no longer present    Electronically Signed On 2018 6:21:32 PDT by KAREN BRAVO MD        All labs reviewed by me.    RADIOLOGY  DX-CHEST-LIMITED (1 VIEW)   Final Result         No acute cardiopulmonary abnormalities are identified.      ECHOCARDIOGRAM COMP W/O CONT    (Results Pending)     The radiologist's interpretation of all radiological studies have been reviewed by me.    COURSE & MEDICAL DECISION MAKING  Nursing notes, VS,  PMSFHx reviewed in chart.     Review of past medical records shows the patient was last seen in the ED 03/2018 for nausea, vomiting and diarrhea.     5:13 AM - Patient seen and examined at bedside. Patient will be treated with 1 mg Dilaudid and 4 mg Zofran. Ordered chest x-ray, D-Dimer, troponin, BNP, CBC, CMP, prothrombin time, APTT, lipase, estimated GFR and EKG to evaluate her symptoms. The differential diagnoses include but are not limited to: ACS, pneumonia, pulmonary embolism     The patient is treated with IV fluid in the emergency department as she is tachycardic and nauseated and unable to tolerate oral fluids.    5:19 AM I have signed into and reviewed the patient's prescription monitoring program data prior to prescribing a scheduled drug which reveals extensive narcotic history.     5:49 AM Reviewed patient's lab and radiology results, which are shown above.     5:54 AM Consulted with hosptialist, Dr. Olivares and discussed patient's condition. He will admit the patient.     5:56 AM Ordered 3 g Unasyn in  mL IVPB and 500 mg Zithromax.    On reevaluation the patient is feeling better.  Heart rate is improved following treatment with the above medications including IV fluid resuscitation.  However, she requires admission to the hospital for further evaluation.    The patient presents today with left-sided chest pain.  She has had some chills.  She developed a fever in the emergency department.  Chest x-ray does not demonstrate any evidence of pneumonia but given her fever, tachycardia, chest pain I feel that treating her for pneumonia as appropriate.    DISPOSITION:  Patient will be admitted to hosptialistDr. Olivares in guarded condition.    FINAL IMPRESSION  1. Acute chest pain    2. Narcotic dependence (HCC)    3. Type 1 diabetes mellitus with complication (HCC)    4. Fever, unspecified fever cause          IShazia (Anisa), am scribing for, and in the presence of, Chevy Enciso,  M.D..    Electronically signed by: Shazia Shahid (Scribe), 6/6/2018    IChevy M.D. personally performed the services described in this documentation, as scribed by Shazia Shahid in my presence, and it is both accurate and complete.    The note accurately reflects work and decisions made by me.  Chevy Enciso  6/6/2018  10:58 AM

## 2018-06-06 NOTE — ASSESSMENT & PLAN NOTE
CTA chest shows multifocal consolidation  Patient on IV ceftriaxone, cultures no growth to date  Pro-calcitonin negative  CT abdomen negative for any acute finding

## 2018-06-06 NOTE — H&P
Hospital Medicine History and Physical    Date of Service  6/6/2018    Chief Complaint  Chief Complaint   Patient presents with   • Chest Pain   • Shortness of Breath       History of Presenting Illness  52 y.o. female who presented 6/6/2018 with evaluation of left-sided chest pain. Started about 30 minutes prior to arrival. Has been constant since arriving. She has worsening pain with deep breaths and lying flat. Feels like the pain moves when she is moving. Especially when she was on the car ride to the hospital. With each bump she was feeling the pain moved. She has associated shortness of breath and nausea. She is also had a fever and chills. She has diagnoses of recurrent urinary tract infection and feels that she has another urinary tract infection with dysuria. She has no abdominal pain no nausea no vomiting. She does have a history of diabetes. No alleviating factors to her pain.     Primary Care Physician  Renate Gr M.D.    Consultants  None    Code Status  Full    Review of Systems  Review of Systems   Constitutional: Positive for chills and fever.   HENT: Negative for congestion, hearing loss and tinnitus.    Eyes: Negative for blurred vision, double vision and discharge.   Respiratory: Positive for shortness of breath. Negative for cough and hemoptysis.    Cardiovascular: Positive for chest pain. Negative for palpitations and leg swelling.   Gastrointestinal: Positive for nausea. Negative for abdominal pain, heartburn and vomiting.   Genitourinary: Positive for dysuria. Negative for flank pain.   Musculoskeletal: Negative for joint pain and myalgias.   Skin: Negative for rash.   Neurological: Negative for dizziness, sensory change, speech change, focal weakness and weakness.   Endo/Heme/Allergies: Negative for environmental allergies. Does not bruise/bleed easily.   Psychiatric/Behavioral: Negative for depression, hallucinations and substance abuse.        Past Medical History  Past Medical  History:   Diagnosis Date   • Arthritis    • Cataract    • COPD (chronic obstructive pulmonary disease) ([facility])    • Diabetes     juvenile, type I   • Diabetic neuropathy ([facility])    • Diabetic retinopathy    • Diabetic retinopathy ([facility])    • Fibromyalgia    • Hypertension    • Migraine    • POTS (postural orthostatic tachycardia syndrome)     treated with hydrocodone   • Recurrent UTI    • Rheumatoid arthritis ([facility])    • Rheumatoid arthritis ([facility])    • Sleep apnea     cpap ordered, doesn't use   • TIA (transient ischemic attack)        Surgical History  Past Surgical History:   Procedure Laterality Date   • GASTROSCOPY WITH BALLOON DILATATION  2/13/2015    Performed by Venancio Aburto M.D. at SURGERY [facility]   • GASTROSCOPY WITH BIOPSY  2/13/2015    Performed by Venancio Aburto M.D. at SURGERY [facility] [place]   • FOREIGN BODY REMOVAL  6/18/2013    Performed by Raz Mari M.D. at SURGERY SAME DAY [facility]S   • CHOLECYSTECTOMY     • OTHER      Skin disorder of unknown name   • PRIMARY C SECTION     • TONSILLECTOMY         Medications  No current facility-administered medications on file prior to encounter.      Current Outpatient Prescriptions on File Prior to Encounter   Medication Sig Dispense Refill   • polyethylene glycol 3350 (MIRALAX) Powder Take 17 g by mouth every day.  3   • Insulin Glargine (TOUJEO SOLOSTAR) 300 UNIT/ML Solution Pen-injector Inject 28 Units as instructed every bedtime.     • insulin lispro (HUMALOG) 100 UNIT/ML Solution Inject 1-5 Units as instructed 3 times a day before meals. Sliding scale     • gabapentin (NEURONTIN) 600 MG tablet Take 2 Tabs by mouth 2 times a day.     • diazepam (VALIUM) 10 MG tablet Take 1 Tab by mouth every 6 hours as needed for Anxiety for up to 30 days. 120 Tab 0   • cloNIDine (CATAPRES) 0.1 MG Tab Take 1 Tab by mouth at bedtime as needed (insomnia).     • cyclobenzaprine (FLEXERIL) 10 MG Tab Take 1 Tab by mouth 2 times a day as needed.      • calcitRIOL (ROCALTROL) 0.25 MCG Cap Take 1 Cap by mouth every day. 30 Cap    • carisoprodol (SOMA) 350 MG Tab Take 1 Tab by mouth 4 times a day for 30 days. 120 Tab 0   • HYDROcodone/acetaminophen (NORCO)  MG Tab Take 2 Tabs by mouth 4 times a day for 30 days. 280 Tab 0   • HYDROcodone Bitartrate 50 MG Capsule Extended Release 12 hour Abuse-Deterrent Take 1 Tab by mouth every 12 hours.     • Suvorexant (BELSOMRA) 15 MG Tab Take 1 Tab by mouth every bedtime.         Family History  Family History   Problem Relation Age of Onset   • No Known Problems Sister    • No Known Problems Brother    • No Known Problems Brother    • No Known Problems Daughter    • No Known Problems Daughter        Social History  Social History   Substance Use Topics   • Smoking status: Current Every Day Smoker     Packs/day: 0.50     Years: 29.00     Types: Cigarettes   • Smokeless tobacco: Never Used      Comment:  PPD   • Alcohol use No       Allergies  Allergies   Allergen Reactions   • Compazine      Aggressive behavior    • Sulfa Drugs Itching     Blotchy spots on chest        Physical Exam  Laboratory   Hemodynamics  Temp (24hrs), Av.4 °C (101.1 °F), Min:38 °C (100.4 °F), Max:38.7 °C (101.7 °F)   Temperature: (!) 38.7 °C (101.7 °F)  Pulse  Av  Min: 106  Max: 112 Heart Rate (Monitored): (!) 105  Blood Pressure: (!) 185/87      Respiratory      Respiration: 20, Pulse Oximetry: 95 %             Physical Exam   Constitutional: She is oriented to person, place, and time. She appears well-developed and well-nourished. She appears distressed.   HENT:   Head: Normocephalic and atraumatic.   Eyes: Conjunctivae and EOM are normal. Pupils are equal, round, and reactive to light.   Neck: Normal range of motion. Neck supple. No JVD present.   Cardiovascular: Normal rate, regular rhythm, normal heart sounds and intact distal pulses.    No murmur heard.  Pulmonary/Chest: Effort normal and breath sounds normal. No respiratory  distress. She has no wheezes.   Abdominal: Soft. Bowel sounds are normal. She exhibits no distension. There is tenderness.   Suprapubic   Musculoskeletal: Normal range of motion. She exhibits no edema.   Neurological: She is alert and oriented to person, place, and time. She exhibits normal muscle tone.   Skin: Skin is warm and dry.   Psychiatric: She has a normal mood and affect. Her behavior is normal. Judgment and thought content normal.   Nursing note and vitals reviewed.      Recent Labs      06/06/18 0457   WBC  11.1*   RBC  3.72*   HEMOGLOBIN  12.0   HEMATOCRIT  35.9*   MCV  96.5   MCH  32.3   MCHC  33.4*   RDW  45.5   PLATELETCT  222   MPV  11.7     Recent Labs      06/06/18 0457   SODIUM  139   POTASSIUM  4.2   CHLORIDE  104   CO2  28   GLUCOSE  171*   BUN  22   CREATININE  1.16   CALCIUM  9.3     Recent Labs      06/06/18 0457   ALTSGPT  38   ASTSGOT  30   ALKPHOSPHAT  165*   TBILIRUBIN  0.3   LIPASE  <3*   GLUCOSE  171*     Recent Labs      06/06/18 0457   APTT  28.7   INR  0.99             Lab Results   Component Value Date    TROPONINI <0.01 06/06/2018     Urinalysis:    Lab Results  Component Value Date/Time   SPECGRAVITY 1.015 03/07/2018 0322   GLUCOSEUR 100 (A) 03/07/2018 0322   KETONES Negative 03/07/2018 0322   NITRITE Negative 03/07/2018 0322   WBCURINE 5-10 (A) 03/07/2018 0322   RBCURINE 5-10 (A) 03/07/2018 0322   BACTERIA Few (A) 03/07/2018 0322   EPITHELCELL Few 03/07/2018 0322        Imaging  DX-CHEST-LIMITED (1 VIEW) [[id number]] Resulted: 06/06/18 0540   Order Status: Completed Updated: 06/06/18 0542   Narrative:       6/6/2018 5:24 AM    HISTORY/REASON FOR EXAM:  Chest Pain      TECHNIQUE/EXAM DESCRIPTION AND NUMBER OF VIEWS:  Single portable view of the chest.    COMPARISON: 11/26/2017    FINDINGS:      No pulmonary infiltrates or consolidations are noted.  No pleural effusion. No pneumothorax.  Stable cardiopericardial silhouette.     Impression:         No acute cardiopulmonary  abnormalities are identified.        Assessment/Plan     I anticipate this patient is appropriate for observation status at this time.    * Pain in the chest   Assessment & Plan    This patient presents with pleuritic type chest pain. Also worse with lying flat alleviated by sitting forward. Also has some chills and fever. This may be concerning for possible pericarditis. We'll trend the troponin  Check echocardiogram  ESR CRP          Sepsis ([facility])   Assessment & Plan    This is sepsis (without associated acute organ dysfunction).   Patient also presents with signs and symptoms concerning for sepsis including tachycardia tachypnea febrile with leukocytosis source is unknown at this time she could have pulmonary source ordered a pro-calcitonin  Started on IV ceftriaxone  Urinalysis and culture is ordered  Trending lactic  IV fluids  De-escalate therapy as clinically appropriate        Tachycardia   Assessment & Plan    IVFassess response          Rheumatoid arthritis ([facility])- (present on admission)   Assessment & Plan    Resume home pain medications         Type 1 diabetes mellitus with complication ([facility])- (present on admission)   Assessment & Plan    Resume home galargine and ssi   Accu checks           COPD (chronic obstructive pulmonary disease) ([facility])- (present on admission)   Assessment & Plan    This is not an acute exacerbation  Respiratory care protocol ordered        Tobacco dependence- (present on admission)   Assessment & Plan    Needs smoking cessation counseling            VTE prophylaxis: heparin

## 2018-06-06 NOTE — PROGRESS NOTES
Received pt from ER. Pt alert and oriented x4. Pt up ad harry. Pt resting comfortably in bed. VSS. Safety maintained. Belongings within reach. Bed in lowest position and call light within reach. Pt updated on plan of care and pt verbalized understanding. Will continue to monitor.

## 2018-06-07 LAB
ALBUMIN SERPL BCP-MCNC: 3 G/DL (ref 3.2–4.9)
ALBUMIN/GLOB SERPL: 1.4 G/DL
ALP SERPL-CCNC: 122 U/L (ref 30–99)
ALT SERPL-CCNC: 31 U/L (ref 2–50)
ANION GAP SERPL CALC-SCNC: 6 MMOL/L (ref 0–11.9)
APPEARANCE UR: CLEAR
AST SERPL-CCNC: 24 U/L (ref 12–45)
BACTERIA #/AREA URNS HPF: NEGATIVE /HPF
BASOPHILS # BLD AUTO: 0.4 % (ref 0–1.8)
BASOPHILS # BLD: 0.04 K/UL (ref 0–0.12)
BILIRUB SERPL-MCNC: 0.4 MG/DL (ref 0.1–1.5)
BILIRUB UR QL STRIP.AUTO: NEGATIVE
BUN SERPL-MCNC: 20 MG/DL (ref 8–22)
CALCIUM SERPL-MCNC: 8.3 MG/DL (ref 8.5–10.5)
CHLORIDE SERPL-SCNC: 108 MMOL/L (ref 96–112)
CHOLEST SERPL-MCNC: 96 MG/DL (ref 100–199)
CO2 SERPL-SCNC: 23 MMOL/L (ref 20–33)
COLOR UR: YELLOW
CREAT SERPL-MCNC: 0.91 MG/DL (ref 0.5–1.4)
EOSINOPHIL # BLD AUTO: 0.1 K/UL (ref 0–0.51)
EOSINOPHIL NFR BLD: 0.9 % (ref 0–6.9)
EPI CELLS #/AREA URNS HPF: NEGATIVE /HPF
ERYTHROCYTE [DISTWIDTH] IN BLOOD BY AUTOMATED COUNT: 45.9 FL (ref 35.9–50)
GLOBULIN SER CALC-MCNC: 2.1 G/DL (ref 1.9–3.5)
GLUCOSE BLD-MCNC: 130 MG/DL (ref 65–99)
GLUCOSE BLD-MCNC: 172 MG/DL (ref 65–99)
GLUCOSE BLD-MCNC: 233 MG/DL (ref 65–99)
GLUCOSE BLD-MCNC: 267 MG/DL (ref 65–99)
GLUCOSE BLD-MCNC: 269 MG/DL (ref 65–99)
GLUCOSE SERPL-MCNC: 236 MG/DL (ref 65–99)
GLUCOSE UR STRIP.AUTO-MCNC: NEGATIVE MG/DL
HCT VFR BLD AUTO: 26.6 % (ref 37–47)
HDLC SERPL-MCNC: 32 MG/DL
HGB BLD-MCNC: 9 G/DL (ref 12–16)
HYALINE CASTS #/AREA URNS LPF: ABNORMAL /LPF
IMM GRANULOCYTES # BLD AUTO: 0.06 K/UL (ref 0–0.11)
IMM GRANULOCYTES NFR BLD AUTO: 0.6 % (ref 0–0.9)
KETONES UR STRIP.AUTO-MCNC: NEGATIVE MG/DL
LDLC SERPL CALC-MCNC: 49 MG/DL
LEUKOCYTE ESTERASE UR QL STRIP.AUTO: ABNORMAL
LV EJECT FRACT  99904: 65
LV EJECT FRACT MOD 2C 99903: 63.04
LV EJECT FRACT MOD 4C 99902: 58.78
LV EJECT FRACT MOD BP 99901: 61.72
LYMPHOCYTES # BLD AUTO: 1.45 K/UL (ref 1–4.8)
LYMPHOCYTES NFR BLD: 13.4 % (ref 22–41)
MCH RBC QN AUTO: 33 PG (ref 27–33)
MCHC RBC AUTO-ENTMCNC: 33.8 G/DL (ref 33.6–35)
MCV RBC AUTO: 97.4 FL (ref 81.4–97.8)
MICRO URNS: ABNORMAL
MONOCYTES # BLD AUTO: 0.83 K/UL (ref 0–0.85)
MONOCYTES NFR BLD AUTO: 7.7 % (ref 0–13.4)
NEUTROPHILS # BLD AUTO: 8.34 K/UL (ref 2–7.15)
NEUTROPHILS NFR BLD: 77 % (ref 44–72)
NITRITE UR QL STRIP.AUTO: NEGATIVE
NRBC # BLD AUTO: 0 K/UL
NRBC BLD-RTO: 0 /100 WBC
PH UR STRIP.AUTO: 5 [PH]
PLATELET # BLD AUTO: 144 K/UL (ref 164–446)
PMV BLD AUTO: 11.6 FL (ref 9–12.9)
POTASSIUM SERPL-SCNC: 4.1 MMOL/L (ref 3.6–5.5)
PROT SERPL-MCNC: 5.1 G/DL (ref 6–8.2)
PROT UR QL STRIP: NEGATIVE MG/DL
RBC # BLD AUTO: 2.7 M/UL (ref 4.2–5.4)
RBC # URNS HPF: ABNORMAL /HPF
RBC UR QL AUTO: NEGATIVE
SODIUM SERPL-SCNC: 137 MMOL/L (ref 135–145)
SP GR UR STRIP.AUTO: 1.02
TRIGL SERPL-MCNC: 75 MG/DL (ref 0–149)
UROBILINOGEN UR STRIP.AUTO-MCNC: 0.2 MG/DL
WBC # BLD AUTO: 10.8 K/UL (ref 4.8–10.8)
WBC #/AREA URNS HPF: ABNORMAL /HPF

## 2018-06-07 PROCEDURE — 700102 HCHG RX REV CODE 250 W/ 637 OVERRIDE(OP): Performed by: HOSPITALIST

## 2018-06-07 PROCEDURE — 99233 SBSQ HOSP IP/OBS HIGH 50: CPT | Performed by: INTERNAL MEDICINE

## 2018-06-07 PROCEDURE — 770020 HCHG ROOM/CARE - TELE (206)

## 2018-06-07 PROCEDURE — 700111 HCHG RX REV CODE 636 W/ 250 OVERRIDE (IP): Performed by: INTERNAL MEDICINE

## 2018-06-07 PROCEDURE — 80061 LIPID PANEL: CPT

## 2018-06-07 PROCEDURE — 81001 URINALYSIS AUTO W/SCOPE: CPT

## 2018-06-07 PROCEDURE — 700111 HCHG RX REV CODE 636 W/ 250 OVERRIDE (IP): Performed by: HOSPITALIST

## 2018-06-07 PROCEDURE — A9270 NON-COVERED ITEM OR SERVICE: HCPCS | Performed by: HOSPITALIST

## 2018-06-07 PROCEDURE — 82962 GLUCOSE BLOOD TEST: CPT | Mod: 91

## 2018-06-07 PROCEDURE — 93306 TTE W/DOPPLER COMPLETE: CPT

## 2018-06-07 PROCEDURE — 80053 COMPREHEN METABOLIC PANEL: CPT

## 2018-06-07 PROCEDURE — 85025 COMPLETE CBC W/AUTO DIFF WBC: CPT

## 2018-06-07 PROCEDURE — 700105 HCHG RX REV CODE 258: Performed by: HOSPITALIST

## 2018-06-07 PROCEDURE — 36415 COLL VENOUS BLD VENIPUNCTURE: CPT

## 2018-06-07 PROCEDURE — 93306 TTE W/DOPPLER COMPLETE: CPT | Mod: 26 | Performed by: INTERNAL MEDICINE

## 2018-06-07 RX ADMIN — CALCITRIOL 0.25 MCG: 0.25 CAPSULE, LIQUID FILLED ORAL at 05:04

## 2018-06-07 RX ADMIN — SENNOSIDES AND DOCUSATE SODIUM 2 TABLET: 8.6; 5 TABLET ORAL at 05:03

## 2018-06-07 RX ADMIN — ASPIRIN 325 MG: 325 TABLET ORAL at 05:04

## 2018-06-07 RX ADMIN — INSULIN GLARGINE 22 UNITS: 100 INJECTION, SOLUTION SUBCUTANEOUS at 20:30

## 2018-06-07 RX ADMIN — INSULIN HUMAN 3 UNITS: 100 INJECTION, SOLUTION PARENTERAL at 20:30

## 2018-06-07 RX ADMIN — HEPARIN SODIUM 5000 UNITS: 5000 INJECTION, SOLUTION INTRAVENOUS; SUBCUTANEOUS at 05:05

## 2018-06-07 RX ADMIN — GABAPENTIN 600 MG: 300 CAPSULE ORAL at 20:20

## 2018-06-07 RX ADMIN — HYDROMORPHONE HYDROCHLORIDE 0.5 MG: 2 INJECTION INTRAMUSCULAR; INTRAVENOUS; SUBCUTANEOUS at 21:33

## 2018-06-07 RX ADMIN — HYDROMORPHONE HYDROCHLORIDE 0.5 MG: 2 INJECTION INTRAMUSCULAR; INTRAVENOUS; SUBCUTANEOUS at 18:01

## 2018-06-07 RX ADMIN — CARISOPRODOL 350 MG: 350 TABLET ORAL at 05:04

## 2018-06-07 RX ADMIN — DIAZEPAM 10 MG: 5 TABLET ORAL at 14:26

## 2018-06-07 RX ADMIN — HEPARIN SODIUM 5000 UNITS: 5000 INJECTION, SOLUTION INTRAVENOUS; SUBCUTANEOUS at 22:33

## 2018-06-07 RX ADMIN — HYDROMORPHONE HYDROCHLORIDE 0.5 MG: 2 INJECTION INTRAMUSCULAR; INTRAVENOUS; SUBCUTANEOUS at 09:26

## 2018-06-07 RX ADMIN — OXYCODONE HYDROCHLORIDE 10 MG: 10 TABLET ORAL at 08:14

## 2018-06-07 RX ADMIN — INSULIN HUMAN 3 UNITS: 100 INJECTION, SOLUTION PARENTERAL at 17:27

## 2018-06-07 RX ADMIN — SODIUM CHLORIDE: 9 INJECTION, SOLUTION INTRAVENOUS at 01:51

## 2018-06-07 RX ADMIN — OXYCODONE HYDROCHLORIDE 10 MG: 10 TABLET ORAL at 20:40

## 2018-06-07 RX ADMIN — HEPARIN SODIUM 5000 UNITS: 5000 INJECTION, SOLUTION INTRAVENOUS; SUBCUTANEOUS at 14:16

## 2018-06-07 RX ADMIN — CLONIDINE HYDROCHLORIDE 0.1 MG: 0.1 TABLET ORAL at 03:26

## 2018-06-07 RX ADMIN — HYDROMORPHONE HYDROCHLORIDE 0.5 MG: 2 INJECTION INTRAMUSCULAR; INTRAVENOUS; SUBCUTANEOUS at 15:01

## 2018-06-07 RX ADMIN — OXYCODONE HYDROCHLORIDE 10 MG: 10 TABLET ORAL at 03:27

## 2018-06-07 RX ADMIN — CARISOPRODOL 350 MG: 350 TABLET ORAL at 15:01

## 2018-06-07 RX ADMIN — INSULIN HUMAN 1 UNITS: 100 INJECTION, SOLUTION PARENTERAL at 08:16

## 2018-06-07 RX ADMIN — CARISOPRODOL 350 MG: 350 TABLET ORAL at 18:02

## 2018-06-07 RX ADMIN — OXYCODONE HYDROCHLORIDE 10 MG: 10 TABLET ORAL at 11:13

## 2018-06-07 RX ADMIN — CEFTRIAXONE 2 G: 2 INJECTION, POWDER, FOR SOLUTION INTRAMUSCULAR; INTRAVENOUS at 10:09

## 2018-06-07 RX ADMIN — HYDROMORPHONE HYDROCHLORIDE 0.5 MG: 2 INJECTION INTRAMUSCULAR; INTRAVENOUS; SUBCUTANEOUS at 01:52

## 2018-06-07 RX ADMIN — OXYCODONE HYDROCHLORIDE 10 MG: 10 TABLET ORAL at 17:23

## 2018-06-07 RX ADMIN — CARISOPRODOL 350 MG: 350 TABLET ORAL at 11:13

## 2018-06-07 RX ADMIN — HYDROMORPHONE HYDROCHLORIDE 0.5 MG: 2 INJECTION INTRAMUSCULAR; INTRAVENOUS; SUBCUTANEOUS at 05:03

## 2018-06-07 RX ADMIN — HYDROMORPHONE HYDROCHLORIDE 0.5 MG: 2 INJECTION INTRAMUSCULAR; INTRAVENOUS; SUBCUTANEOUS at 12:23

## 2018-06-07 RX ADMIN — OXYCODONE HYDROCHLORIDE 10 MG: 10 TABLET ORAL at 14:16

## 2018-06-07 RX ADMIN — SODIUM CHLORIDE: 9 INJECTION, SOLUTION INTRAVENOUS at 13:02

## 2018-06-07 ASSESSMENT — PATIENT HEALTH QUESTIONNAIRE - PHQ9
1. LITTLE INTEREST OR PLEASURE IN DOING THINGS: NOT AT ALL
2. FEELING DOWN, DEPRESSED, IRRITABLE, OR HOPELESS: NOT AT ALL
SUM OF ALL RESPONSES TO PHQ9 QUESTIONS 1 AND 2: 0

## 2018-06-07 ASSESSMENT — ENCOUNTER SYMPTOMS
DEPRESSION: 0
ORTHOPNEA: 0
ABDOMINAL PAIN: 0
COUGH: 1
PALPITATIONS: 0
SPUTUM PRODUCTION: 0
FEVER: 0
DIARRHEA: 0
CONSTIPATION: 0
CHILLS: 0
SHORTNESS OF BREATH: 1
VOMITING: 0
HEADACHES: 0
NAUSEA: 0
WEAKNESS: 1

## 2018-06-07 ASSESSMENT — PAIN SCALES - GENERAL
PAINLEVEL_OUTOF10: 7
PAINLEVEL_OUTOF10: 7
PAINLEVEL_OUTOF10: 8
PAINLEVEL_OUTOF10: 8
PAINLEVEL_OUTOF10: 7
PAINLEVEL_OUTOF10: 8
PAINLEVEL_OUTOF10: 8
PAINLEVEL_OUTOF10: 4
PAINLEVEL_OUTOF10: 6
PAINLEVEL_OUTOF10: 7

## 2018-06-07 NOTE — PROGRESS NOTES
Patient resting in bed. c/o pain. Admin pain med per order. Bed in low locked position. Call bell within reach. Continue to monitor.

## 2018-06-07 NOTE — PROGRESS NOTES
"Assumed pt care, pt is lethargic, arouses spontaneously, neurologically intact.  SaO2 92% on 1 L O2.  Pt still complains of pain 5/10. Per pt, same pain characteristic throughout the day.  Pt given heat pack over the chest, pt stated this \"helps a lot.\"  Pt otherwise VSS and NSR on the monitor.  Family at bedside.  Pt was able to urinate w/o difficulty.  Ambulated to the bathroom standby assist.  Denies any HA or dizziness.  Fall precautions in place and call light is w/in reach.  IV patent.  Will continue to monitor.   "

## 2018-06-07 NOTE — PROGRESS NOTES
[facility] Hospitalist Progress Note    Date of Service: 2018    Chief Complaint  52 y.o. female admitted 2018 with chest pain felt to be secondary to community acquired pneumonia    Interval Problem Update  Patient complains of midsternal chest pain on palpation alleviated by heat packs. Patient started on ibuprofen for alleviation. Nuclear stress tests ordered to rule out cardiac cause. Patient CTA chest was negative for PE and showed multifocal pneumonia. Follow patient's culture results and continue patient on IV ceftriaxone.    Consultants/Specialty  None    Disposition  TBD        Review of Systems   Constitutional: Positive for malaise/fatigue. Negative for chills and fever.   Respiratory: Positive for cough and shortness of breath. Negative for sputum production.    Cardiovascular: Positive for chest pain (midsternal, tender on palpation ). Negative for palpitations and orthopnea.   Gastrointestinal: Negative for abdominal pain, constipation, diarrhea, nausea and vomiting.   Genitourinary: Negative for dysuria, frequency and urgency.   Musculoskeletal: Negative for joint pain.   Neurological: Positive for weakness. Negative for headaches.   Psychiatric/Behavioral: Negative for depression.      Physical Exam  Laboratory/Imaging   Hemodynamics  Temp (24hrs), Av.2 °C (98.9 °F), Min:36.5 °C (97.7 °F), Max:38.2 °C (100.7 °F)   Temperature: 36.5 °C (97.7 °F)  Pulse  Av  Min: 75  Max: 112   Blood Pressure: 117/65      Respiratory      Respiration: 18, Pulse Oximetry: 94 %, O2 Daily Delivery Respiratory : Silicone Nasal Cannula     Work Of Breathing / Effort: Shallow  RUL Breath Sounds: Clear, RML Breath Sounds: Clear, RLL Breath Sounds: Clear, PARVEEN Breath Sounds: Clear, LLL Breath Sounds: Clear    Fluids    Intake/Output Summary (Last 24 hours) at 18 1304  Last data filed at 18 0915   Gross per 24 hour   Intake             1940 ml   Output              350 ml   Net             1590 ml        Nutrition  Orders Placed This Encounter   Procedures   • Diet Order     Standing Status:   Standing     Number of Occurrences:   1     Order Specific Question:   Diet:     Answer:   Regular [1]     Physical Exam   Constitutional: She is oriented to person, place, and time. She appears well-developed and well-nourished.   HENT:   Head: Normocephalic and atraumatic.   Right Ear: External ear normal.   Left Ear: External ear normal.   Mouth/Throat: Oropharynx is clear and moist.   Eyes: Conjunctivae are normal. Right eye exhibits no discharge. Left eye exhibits no discharge.   Neck: Normal range of motion. Neck supple.   Cardiovascular: Normal rate, regular rhythm and normal heart sounds.    Pulmonary/Chest: No respiratory distress. She has no wheezes. She exhibits tenderness (midsternal on palpation).   Decreased breath sounds bilateral bases   Abdominal: Soft. There is no tenderness. There is no rebound.   Neurological: She is alert and oriented to person, place, and time.   Skin: Skin is warm and dry.   Psychiatric: She has a normal mood and affect. Her behavior is normal.       Recent Labs      06/06/18 0457 06/07/18 0631   WBC  11.1*  10.8   RBC  3.72*  2.70*   HEMOGLOBIN  12.0  9.0*   HEMATOCRIT  35.9*  26.6*   MCV  96.5  97.4   MCH  32.3  33.0   MCHC  33.4*  33.8   RDW  45.5  45.9   PLATELETCT  222  144*   MPV  11.7  11.6     Recent Labs      06/06/18 0457  06/07/18   0316   SODIUM  139  137   POTASSIUM  4.2  4.1   CHLORIDE  104  108   CO2  28  23   GLUCOSE  171*  236*   BUN  22  20   CREATININE  1.16  0.91   CALCIUM  9.3  8.3*     Recent Labs      06/06/18 0457   APTT  28.7   INR  0.99     Recent Labs      06/06/18 0457   BNPBTYPENAT  27     Recent Labs      06/07/18   0316   TRIGLYCERIDE  75   HDL  32*   LDL  49          Assessment/Plan     * Chest pain   Assessment & Plan    CXR clear, procalcitonin negative  Telemetry, troponin X 2 negative, Echo pending, ASA, statin, ordered lipid  "panel.  Ordered telemetry  Ordered follow up troponins  She has risk factors for CAD including smoking, diabetes  CTA chest negative for PE and aortic dissection  Nuclear stress test ordered for further evaluation          Sepsis ([facility])   Assessment & Plan    CTA chest shows multifocal consolidation  Patient on IV ceftriaxone, cultures no growth to date  Pro-calcitonin negative  CT abdomen negative for any acute finding          Tachycardia   Assessment & Plan    \"IVFassess response\"  Blood pressures stable  Improved  CTA Chest negative for PE        POTS (postural orthostatic tachycardia syndrome)- (present on admission)   Assessment & Plan    Noted. Currently blood pressures stable.        Rheumatoid arthritis ([facility])- (present on admission)   Assessment & Plan    Does not seem to be a flare right now, chest pain from this is atypical        Anxiety- (present on admission)   Assessment & Plan    Reassurance        Carpal tunnel syndrome of right wrist- (present on admission)   Assessment & Plan    Has wrist brace. Continue.        SYBIL (obstructive sleep apnea)- (present on admission)   Assessment & Plan    Not on CPAP or O2. She says she sleeps upright  Order nocturnal oximetry        Type 1 diabetes mellitus with complication ([facility])- (present on admission)   Assessment & Plan    Resume home galargine and ssi   Accu checks           COPD (chronic obstructive pulmonary disease) ([facility])- (present on admission)   Assessment & Plan    Does not seem to be exacerbating  Resp and O2 per protocol        Opiate dependence (CMS-[facility])- (present on admission)   Assessment & Plan    Noted.  Minimize narcotics if groggy or hypotensive.        Tobacco dependence- (present on admission)   Assessment & Plan    Smoking cessation counseling provided.              Quality-Core Measures   Reviewed items::  Labs reviewed and Medications reviewed  Bates catheter::  No Bates  DVT prophylaxis pharmacological::  Heparin  Antibiotics:  Treating " active infection/contamination beyond 24 hours perioperative coverage

## 2018-06-07 NOTE — PROGRESS NOTES
Patient resting in bed. c/o constant stabbing pain on left chest. administered pain med per MAR. Bed in low locked position, call bell within reach. Continue to monitor.

## 2018-06-08 LAB
ANION GAP SERPL CALC-SCNC: 9 MMOL/L (ref 0–11.9)
BACTERIA UR CULT: NORMAL
BUN SERPL-MCNC: 15 MG/DL (ref 8–22)
CALCIUM SERPL-MCNC: 8.8 MG/DL (ref 8.5–10.5)
CHLORIDE SERPL-SCNC: 107 MMOL/L (ref 96–112)
CO2 SERPL-SCNC: 22 MMOL/L (ref 20–33)
CREAT SERPL-MCNC: 0.93 MG/DL (ref 0.5–1.4)
ERYTHROCYTE [DISTWIDTH] IN BLOOD BY AUTOMATED COUNT: 48.1 FL (ref 35.9–50)
GLUCOSE BLD-MCNC: 310 MG/DL (ref 65–99)
GLUCOSE BLD-MCNC: 311 MG/DL (ref 65–99)
GLUCOSE BLD-MCNC: 46 MG/DL (ref 65–99)
GLUCOSE BLD-MCNC: 74 MG/DL (ref 65–99)
GLUCOSE SERPL-MCNC: 120 MG/DL (ref 65–99)
HCT VFR BLD AUTO: 29.3 % (ref 37–47)
HGB BLD-MCNC: 9.4 G/DL (ref 12–16)
MCH RBC QN AUTO: 32.6 PG (ref 27–33)
MCHC RBC AUTO-ENTMCNC: 32.1 G/DL (ref 33.6–35)
MCV RBC AUTO: 101.7 FL (ref 81.4–97.8)
PLATELET # BLD AUTO: 133 K/UL (ref 164–446)
PMV BLD AUTO: 11.6 FL (ref 9–12.9)
POTASSIUM SERPL-SCNC: 3.6 MMOL/L (ref 3.6–5.5)
RBC # BLD AUTO: 2.88 M/UL (ref 4.2–5.4)
SIGNIFICANT IND 70042: NORMAL
SITE SITE: NORMAL
SODIUM SERPL-SCNC: 138 MMOL/L (ref 135–145)
SOURCE SOURCE: NORMAL
WBC # BLD AUTO: 9.8 K/UL (ref 4.8–10.8)

## 2018-06-08 PROCEDURE — 85027 COMPLETE CBC AUTOMATED: CPT

## 2018-06-08 PROCEDURE — 700105 HCHG RX REV CODE 258: Performed by: HOSPITALIST

## 2018-06-08 PROCEDURE — 80048 BASIC METABOLIC PNL TOTAL CA: CPT

## 2018-06-08 PROCEDURE — 700111 HCHG RX REV CODE 636 W/ 250 OVERRIDE (IP): Performed by: HOSPITALIST

## 2018-06-08 PROCEDURE — 700111 HCHG RX REV CODE 636 W/ 250 OVERRIDE (IP): Performed by: INTERNAL MEDICINE

## 2018-06-08 PROCEDURE — 36415 COLL VENOUS BLD VENIPUNCTURE: CPT

## 2018-06-08 PROCEDURE — A9270 NON-COVERED ITEM OR SERVICE: HCPCS | Performed by: INTERNAL MEDICINE

## 2018-06-08 PROCEDURE — 99233 SBSQ HOSP IP/OBS HIGH 50: CPT | Performed by: INTERNAL MEDICINE

## 2018-06-08 PROCEDURE — 700101 HCHG RX REV CODE 250: Performed by: HOSPITALIST

## 2018-06-08 PROCEDURE — A9270 NON-COVERED ITEM OR SERVICE: HCPCS | Performed by: HOSPITALIST

## 2018-06-08 PROCEDURE — 770020 HCHG ROOM/CARE - TELE (206)

## 2018-06-08 PROCEDURE — 700102 HCHG RX REV CODE 250 W/ 637 OVERRIDE(OP): Performed by: HOSPITALIST

## 2018-06-08 PROCEDURE — 700102 HCHG RX REV CODE 250 W/ 637 OVERRIDE(OP): Performed by: INTERNAL MEDICINE

## 2018-06-08 PROCEDURE — 82962 GLUCOSE BLOOD TEST: CPT | Mod: 91

## 2018-06-08 RX ORDER — IBUPROFEN 800 MG/1
800 TABLET ORAL 3 TIMES DAILY
Status: DISCONTINUED | OUTPATIENT
Start: 2018-06-08 | End: 2018-06-09 | Stop reason: HOSPADM

## 2018-06-08 RX ADMIN — HYDROMORPHONE HYDROCHLORIDE 0.5 MG: 2 INJECTION INTRAMUSCULAR; INTRAVENOUS; SUBCUTANEOUS at 15:53

## 2018-06-08 RX ADMIN — HYDROMORPHONE HYDROCHLORIDE 0.5 MG: 2 INJECTION INTRAMUSCULAR; INTRAVENOUS; SUBCUTANEOUS at 23:51

## 2018-06-08 RX ADMIN — OXYCODONE HYDROCHLORIDE 10 MG: 10 TABLET ORAL at 13:28

## 2018-06-08 RX ADMIN — INSULIN HUMAN 4 UNITS: 100 INJECTION, SOLUTION PARENTERAL at 21:15

## 2018-06-08 RX ADMIN — DEXTROSE MONOHYDRATE 25 ML: 25 INJECTION, SOLUTION INTRAVENOUS at 11:55

## 2018-06-08 RX ADMIN — CARISOPRODOL 350 MG: 350 TABLET ORAL at 17:28

## 2018-06-08 RX ADMIN — OXYCODONE HYDROCHLORIDE 10 MG: 10 TABLET ORAL at 18:06

## 2018-06-08 RX ADMIN — DEXTROSE MONOHYDRATE 25 ML: 25 INJECTION, SOLUTION INTRAVENOUS at 07:53

## 2018-06-08 RX ADMIN — HYDROMORPHONE HYDROCHLORIDE 0.5 MG: 2 INJECTION INTRAMUSCULAR; INTRAVENOUS; SUBCUTANEOUS at 09:32

## 2018-06-08 RX ADMIN — CARISOPRODOL 350 MG: 350 TABLET ORAL at 13:28

## 2018-06-08 RX ADMIN — HEPARIN SODIUM 5000 UNITS: 5000 INJECTION, SOLUTION INTRAVENOUS; SUBCUTANEOUS at 05:40

## 2018-06-08 RX ADMIN — CARISOPRODOL 350 MG: 350 TABLET ORAL at 05:39

## 2018-06-08 RX ADMIN — CEFTRIAXONE 2 G: 2 INJECTION, POWDER, FOR SOLUTION INTRAMUSCULAR; INTRAVENOUS at 07:57

## 2018-06-08 RX ADMIN — HEPARIN SODIUM 5000 UNITS: 5000 INJECTION, SOLUTION INTRAVENOUS; SUBCUTANEOUS at 13:27

## 2018-06-08 RX ADMIN — HYDROMORPHONE HYDROCHLORIDE 0.5 MG: 2 INJECTION INTRAMUSCULAR; INTRAVENOUS; SUBCUTANEOUS at 19:50

## 2018-06-08 RX ADMIN — OXYCODONE HYDROCHLORIDE 10 MG: 10 TABLET ORAL at 21:24

## 2018-06-08 RX ADMIN — HYDROMORPHONE HYDROCHLORIDE 0.5 MG: 2 INJECTION INTRAMUSCULAR; INTRAVENOUS; SUBCUTANEOUS at 05:50

## 2018-06-08 RX ADMIN — OXYCODONE HYDROCHLORIDE 10 MG: 10 TABLET ORAL at 03:27

## 2018-06-08 RX ADMIN — HEPARIN SODIUM 5000 UNITS: 5000 INJECTION, SOLUTION INTRAVENOUS; SUBCUTANEOUS at 22:20

## 2018-06-08 RX ADMIN — HYDROMORPHONE HYDROCHLORIDE 0.5 MG: 2 INJECTION INTRAMUSCULAR; INTRAVENOUS; SUBCUTANEOUS at 01:40

## 2018-06-08 RX ADMIN — SENNOSIDES AND DOCUSATE SODIUM 2 TABLET: 8.6; 5 TABLET ORAL at 17:28

## 2018-06-08 RX ADMIN — INSULIN GLARGINE 11 UNITS: 100 INJECTION, SOLUTION SUBCUTANEOUS at 21:15

## 2018-06-08 RX ADMIN — OXYCODONE HYDROCHLORIDE 10 MG: 10 TABLET ORAL at 00:14

## 2018-06-08 RX ADMIN — SODIUM CHLORIDE: 9 INJECTION, SOLUTION INTRAVENOUS at 05:40

## 2018-06-08 RX ADMIN — IBUPROFEN 800 MG: 800 TABLET, FILM COATED ORAL at 11:57

## 2018-06-08 RX ADMIN — SENNOSIDES AND DOCUSATE SODIUM 2 TABLET: 8.6; 5 TABLET ORAL at 05:40

## 2018-06-08 RX ADMIN — IBUPROFEN 800 MG: 800 TABLET, FILM COATED ORAL at 17:28

## 2018-06-08 RX ADMIN — ASPIRIN 325 MG: 325 TABLET ORAL at 05:39

## 2018-06-08 RX ADMIN — INSULIN HUMAN 4 UNITS: 100 INJECTION, SOLUTION PARENTERAL at 17:28

## 2018-06-08 RX ADMIN — CARISOPRODOL 350 MG: 350 TABLET ORAL at 10:14

## 2018-06-08 ASSESSMENT — ENCOUNTER SYMPTOMS
ABDOMINAL PAIN: 0
PALPITATIONS: 0
CHILLS: 0
ORTHOPNEA: 0
SPUTUM PRODUCTION: 1
NAUSEA: 0
WEAKNESS: 1
HEADACHES: 0
SHORTNESS OF BREATH: 1
FEVER: 0
COUGH: 1
DEPRESSION: 0

## 2018-06-08 ASSESSMENT — PAIN SCALES - GENERAL
PAINLEVEL_OUTOF10: 7
PAINLEVEL_OUTOF10: 5
PAINLEVEL_OUTOF10: 4
PAINLEVEL_OUTOF10: 3
PAINLEVEL_OUTOF10: 6
PAINLEVEL_OUTOF10: 8
PAINLEVEL_OUTOF10: 7
PAINLEVEL_OUTOF10: 5

## 2018-06-08 NOTE — PROGRESS NOTES
Report received from night rn. Patient has continuous pain in her left chest. Admin med per MAR. Patient currently resting in bed. Bed in low locked position, call bell within reach. Continue to monitor.

## 2018-06-08 NOTE — PROGRESS NOTES
[facility] Hospitalist Progress Note    Date of Service: 2018    Chief Complaint  52 y.o. female admitted 2018 with chest pain felt to be secondary to community acquired pneumonia    Interval Problem Update  Patient continues to complain of midsternal chest pain on palpation alleviated some with heat packs. Ibuprofen ordered for possible inflammation. Nuclear stress test scheduled for today. Willl continue to monitor patient closely on telemetry. Patient complains of blood-streaked sputum with coughing today. Patient encouraged to use incentive spirometer.    Consultants/Specialty  None    Disposition  TBD        Review of Systems   Constitutional: Positive for malaise/fatigue. Negative for chills and fever.   Respiratory: Positive for cough, sputum production (blood tinged) and shortness of breath.    Cardiovascular: Positive for chest pain (midsternal, tender on palpation ). Negative for palpitations and orthopnea.   Gastrointestinal: Negative for abdominal pain and nausea.   Musculoskeletal: Negative for joint pain.   Neurological: Positive for weakness. Negative for headaches.   Psychiatric/Behavioral: Negative for depression.      Physical Exam  Laboratory/Imaging   Hemodynamics  Temp (24hrs), Av.1 °C (98.8 °F), Min:36.4 °C (97.5 °F), Max:37.7 °C (99.8 °F)   Temperature: 37.3 °C (99.2 °F)  Pulse  Av.8  Min: 75  Max: 112   Blood Pressure: 140/72      Respiratory      Respiration: 18, Pulse Oximetry: 90 %     Work Of Breathing / Effort: Shallow  RUL Breath Sounds: Clear, RML Breath Sounds: Clear, RLL Breath Sounds: Diminished, PARVEEN Breath Sounds: Clear, LLL Breath Sounds: Diminished    Fluids    Intake/Output Summary (Last 24 hours) at 18 1127  Last data filed at 18 0400   Gross per 24 hour   Intake             1440 ml   Output              300 ml   Net             1140 ml       Nutrition  Orders Placed This Encounter   Procedures   • Diet Order     Standing Status:   Standing     Number  of Occurrences:   1     Order Specific Question:   Diet:     Answer:   Regular [1]     Physical Exam   Constitutional: She is oriented to person, place, and time. She appears well-developed and well-nourished.   HENT:   Head: Normocephalic and atraumatic.   Right Ear: External ear normal.   Left Ear: External ear normal.   Mouth/Throat: Oropharynx is clear and moist.   Eyes: Conjunctivae are normal. Right eye exhibits no discharge. Left eye exhibits no discharge.   Neck: Normal range of motion. Neck supple.   Cardiovascular: Normal rate, regular rhythm and normal heart sounds.    Pulmonary/Chest: Effort normal. No respiratory distress. She has no wheezes. She exhibits tenderness (midsternal on palpation).   Decreased breath sounds bilateral bases   Abdominal: Soft. There is no tenderness. There is no rebound.   Musculoskeletal: She exhibits no edema.   Neurological: She is alert and oriented to person, place, and time.   Skin: Skin is warm and dry. No rash noted.   Psychiatric: She has a normal mood and affect. Her behavior is normal.       Recent Labs      06/06/18 0457 06/07/18 0631  06/08/18   0433   WBC  11.1*  10.8  9.8   RBC  3.72*  2.70*  2.88*   HEMOGLOBIN  12.0  9.0*  9.4*   HEMATOCRIT  35.9*  26.6*  29.3*   MCV  96.5  97.4  101.7*   MCH  32.3  33.0  32.6   MCHC  33.4*  33.8  32.1*   RDW  45.5  45.9  48.1   PLATELETCT  222  144*  133*   MPV  11.7  11.6  11.6     Recent Labs      06/06/18 0457 06/07/18 0316  06/08/18   0433   SODIUM  139  137  138   POTASSIUM  4.2  4.1  3.6   CHLORIDE  104  108  107   CO2  28  23  22   GLUCOSE  171*  236*  120*   BUN  22  20  15   CREATININE  1.16  0.91  0.93   CALCIUM  9.3  8.3*  8.8     Recent Labs      06/06/18 0457   APTT  28.7   INR  0.99     Recent Labs      06/06/18 0457   BNPBTYPENAT  27     Recent Labs      06/07/18   0316   TRIGLYCERIDE  75   HDL  32*   LDL  49          Assessment/Plan     * Chest pain   Assessment & Plan    CXR clear, procalcitonin  "negative  Telemetry, troponin X 2 negative, Echo pending, ASA, statin, ordered lipid panel.  She has risk factors for CAD including smoking, diabetes  CTA chest negative for PE and aortic dissection  Nuclear stress test pending  Likely musculoskeletal, tender on palpation, patient started on ibuprofen          Sepsis ([facility])   Assessment & Plan    CTA chest shows multifocal consolidation  Patient on IV ceftriaxone, cultures no growth to date  Pro-calcitonin negative  CT abdomen negative for any acute finding          Tachycardia   Assessment & Plan    \"IVFassess response\"  Blood pressures stable  Improved  CTA Chest negative for PE        POTS (postural orthostatic tachycardia syndrome)- (present on admission)   Assessment & Plan    Noted. Currently blood pressures stable.        Rheumatoid arthritis ([facility])- (present on admission)   Assessment & Plan    Does not seem to be a flare right now, chest pain from this is atypical        Anxiety- (present on admission)   Assessment & Plan    Reassurance        Carpal tunnel syndrome of right wrist- (present on admission)   Assessment & Plan    Has wrist brace. Continue.        SYBIL (obstructive sleep apnea)- (present on admission)   Assessment & Plan    Not on CPAP or O2. She says she sleeps upright  Order nocturnal oximetry        Type 1 diabetes mellitus with complication ([facility])- (present on admission)   Assessment & Plan    Resume home galargine and ssi   Accu checks           COPD (chronic obstructive pulmonary disease) ([facility])- (present on admission)   Assessment & Plan    Does not seem to be exacerbating  Resp and O2 per protocol        Opiate dependence (CMS-[facility])- (present on admission)   Assessment & Plan    Noted.  Minimize narcotics if groggy or hypotensive.        Tobacco dependence- (present on admission)   Assessment & Plan    Smoking cessation counseling provided.              Quality-Core Measures   Reviewed items::  Labs reviewed and Medications reviewed  Jana " catheter::  No Bates  DVT prophylaxis pharmacological::  Heparin  Antibiotics:  Treating active infection/contamination beyond 24 hours perioperative coverage

## 2018-06-08 NOTE — PROGRESS NOTES
Patient began having blood tinged sputum post coughing. Hospitalist paged. Instructions given to continue to monitor.

## 2018-06-08 NOTE — PROGRESS NOTES
Bedside report received. Patient sitting up in bed. RN assessed pain; patient pain level 7/10. Patient states that the best her pain ever gets in 5/10. RN discussed treatment of pain per MAR patient accepted pain intervention. Patient educated to call for assistance before ambulating; call light within reach. Need for bed alarm assessed; fall precautions in place.

## 2018-06-08 NOTE — CARE PLAN
Problem: Pain Management  Goal: Pain level will decrease to patient's comfort goal    Intervention: Follow pain managment plan developed in collaboration with patient and Interdisciplinary Team  RN will assess patient's pain level and implement pain strategies according to MAR. Rn will also educate patient regarding both nonpharmacologic and pharmacologic pain strategies.        Problem: Respiratory:  Goal: Respiratory status will improve    Intervention: Educate and encourage incentive spirometry usage  rn will assess patients knowledge of IS and encourage patient to use her IS throughout the night.

## 2018-06-08 NOTE — PROGRESS NOTES
Patient stated she does not feel well. Patient became hypoglycemic. Admin half amp of d5. Will f/u.

## 2018-06-08 NOTE — PROGRESS NOTES
Patient continued to have pain throughout night. RN treated pain according to MAR. Patient did have son visit early on in evening. Patient declines any other needs at this time. Call light within reach. Patient started NPO for stress test in the am. Fall precautions in place.

## 2018-06-09 ENCOUNTER — APPOINTMENT (OUTPATIENT)
Dept: RADIOLOGY | Facility: MEDICAL CENTER | Age: 53
DRG: 871 | End: 2018-06-09
Attending: INTERNAL MEDICINE
Payer: MEDICAID

## 2018-06-09 VITALS
OXYGEN SATURATION: 91 % | TEMPERATURE: 97.4 F | WEIGHT: 167.55 LBS | BODY MASS INDEX: 27.92 KG/M2 | HEART RATE: 79 BPM | DIASTOLIC BLOOD PRESSURE: 71 MMHG | RESPIRATION RATE: 18 BRPM | SYSTOLIC BLOOD PRESSURE: 143 MMHG | HEIGHT: 65 IN

## 2018-06-09 PROBLEM — A41.9 SEPSIS (HCC): Status: RESOLVED | Noted: 2018-06-06 | Resolved: 2018-06-09

## 2018-06-09 PROBLEM — R00.0 TACHYCARDIA: Status: RESOLVED | Noted: 2018-06-06 | Resolved: 2018-06-09

## 2018-06-09 PROBLEM — R07.9 CHEST PAIN: Status: RESOLVED | Noted: 2018-06-06 | Resolved: 2018-06-09

## 2018-06-09 LAB
GLUCOSE BLD-MCNC: 187 MG/DL (ref 65–99)
GLUCOSE BLD-MCNC: 377 MG/DL (ref 65–99)

## 2018-06-09 PROCEDURE — 700111 HCHG RX REV CODE 636 W/ 250 OVERRIDE (IP): Performed by: HOSPITALIST

## 2018-06-09 PROCEDURE — A9270 NON-COVERED ITEM OR SERVICE: HCPCS | Performed by: HOSPITALIST

## 2018-06-09 PROCEDURE — 700105 HCHG RX REV CODE 258: Performed by: HOSPITALIST

## 2018-06-09 PROCEDURE — 700111 HCHG RX REV CODE 636 W/ 250 OVERRIDE (IP): Performed by: INTERNAL MEDICINE

## 2018-06-09 PROCEDURE — 700102 HCHG RX REV CODE 250 W/ 637 OVERRIDE(OP): Performed by: INTERNAL MEDICINE

## 2018-06-09 PROCEDURE — 700105 HCHG RX REV CODE 258: Performed by: INTERNAL MEDICINE

## 2018-06-09 PROCEDURE — 99239 HOSP IP/OBS DSCHRG MGMT >30: CPT | Performed by: INTERNAL MEDICINE

## 2018-06-09 PROCEDURE — 82962 GLUCOSE BLOOD TEST: CPT | Mod: 91

## 2018-06-09 PROCEDURE — 99406 BEHAV CHNG SMOKING 3-10 MIN: CPT

## 2018-06-09 PROCEDURE — A9270 NON-COVERED ITEM OR SERVICE: HCPCS | Performed by: INTERNAL MEDICINE

## 2018-06-09 PROCEDURE — 700102 HCHG RX REV CODE 250 W/ 637 OVERRIDE(OP): Performed by: HOSPITALIST

## 2018-06-09 PROCEDURE — 700111 HCHG RX REV CODE 636 W/ 250 OVERRIDE (IP)

## 2018-06-09 PROCEDURE — A9502 TC99M TETROFOSMIN: HCPCS

## 2018-06-09 RX ORDER — REGADENOSON 0.08 MG/ML
INJECTION, SOLUTION INTRAVENOUS
Status: COMPLETED
Start: 2018-06-09 | End: 2018-06-09

## 2018-06-09 RX ORDER — IBUPROFEN 800 MG/1
800 TABLET ORAL 3 TIMES DAILY
Qty: 15 TAB | Refills: 0 | Status: SHIPPED | OUTPATIENT
Start: 2018-06-09 | End: 2019-01-25

## 2018-06-09 RX ORDER — CEFDINIR 300 MG/1
300 CAPSULE ORAL 2 TIMES DAILY
Qty: 10 CAP | Refills: 0 | Status: SHIPPED | OUTPATIENT
Start: 2018-06-09 | End: 2018-06-14

## 2018-06-09 RX ADMIN — SODIUM CHLORIDE: 9 INJECTION, SOLUTION INTRAVENOUS at 00:33

## 2018-06-09 RX ADMIN — OXYCODONE HYDROCHLORIDE 10 MG: 10 TABLET ORAL at 07:41

## 2018-06-09 RX ADMIN — HYDROMORPHONE HYDROCHLORIDE 0.5 MG: 2 INJECTION INTRAMUSCULAR; INTRAVENOUS; SUBCUTANEOUS at 03:00

## 2018-06-09 RX ADMIN — OXYCODONE HYDROCHLORIDE 10 MG: 10 TABLET ORAL at 00:33

## 2018-06-09 RX ADMIN — HYDROMORPHONE HYDROCHLORIDE 0.5 MG: 2 INJECTION INTRAMUSCULAR; INTRAVENOUS; SUBCUTANEOUS at 09:25

## 2018-06-09 RX ADMIN — INSULIN HUMAN 1 UNITS: 100 INJECTION, SOLUTION PARENTERAL at 09:21

## 2018-06-09 RX ADMIN — CEFTRIAXONE 2 G: 2 INJECTION, POWDER, FOR SOLUTION INTRAMUSCULAR; INTRAVENOUS at 09:18

## 2018-06-09 RX ADMIN — CARISOPRODOL 350 MG: 350 TABLET ORAL at 05:14

## 2018-06-09 RX ADMIN — INSULIN HUMAN 5 UNITS: 100 INJECTION, SOLUTION PARENTERAL at 12:30

## 2018-06-09 RX ADMIN — HYDROMORPHONE HYDROCHLORIDE 0.5 MG: 2 INJECTION INTRAMUSCULAR; INTRAVENOUS; SUBCUTANEOUS at 12:27

## 2018-06-09 RX ADMIN — HEPARIN SODIUM 5000 UNITS: 5000 INJECTION, SOLUTION INTRAVENOUS; SUBCUTANEOUS at 05:15

## 2018-06-09 RX ADMIN — ASPIRIN 325 MG: 325 TABLET ORAL at 05:15

## 2018-06-09 RX ADMIN — OXYCODONE HYDROCHLORIDE 10 MG: 10 TABLET ORAL at 04:38

## 2018-06-09 RX ADMIN — IBUPROFEN 800 MG: 800 TABLET, FILM COATED ORAL at 12:28

## 2018-06-09 RX ADMIN — IBUPROFEN 800 MG: 800 TABLET, FILM COATED ORAL at 05:14

## 2018-06-09 RX ADMIN — SENNOSIDES AND DOCUSATE SODIUM 2 TABLET: 8.6; 5 TABLET ORAL at 05:14

## 2018-06-09 RX ADMIN — DIAZEPAM 10 MG: 5 TABLET ORAL at 07:41

## 2018-06-09 RX ADMIN — REGADENOSON 0.4 MG: 0.08 INJECTION, SOLUTION INTRAVENOUS at 08:29

## 2018-06-09 ASSESSMENT — PAIN SCALES - GENERAL
PAINLEVEL_OUTOF10: 2
PAINLEVEL_OUTOF10: 7
PAINLEVEL_OUTOF10: 6
PAINLEVEL_OUTOF10: 2
PAINLEVEL_OUTOF10: 2
PAINLEVEL_OUTOF10: 6
PAINLEVEL_OUTOF10: 6
PAINLEVEL_OUTOF10: 3
PAINLEVEL_OUTOF10: 6

## 2018-06-09 NOTE — PROGRESS NOTES
Bedside report received. Patient sitting up in bed. RN assessed pain; patient stated pain level 4/10. RN discussed provided intervention according to MAR. Patient educated to call for assistance before ambulating; call light within reach. Need for bed alarm assessed; patient gait steady, fall precautions in place as appropriate.

## 2018-06-09 NOTE — DISCHARGE INSTRUCTIONS
Discharge Instructions    Discharged to home by car with relative. Discharged via wheelchair, hospital escort: Yes.  Special equipment needed: Not Applicable    Be sure to schedule a follow-up appointment with your primary care doctor or any specialists as instructed.     Discharge Plan:   Diet Plan: Discussed  Activity Level: Discussed  Smoking Cessation Offered: Patient Refused  Confirmed Follow up Appointment: Appointment Scheduled  Confirmed Symptoms Management: Discussed  Medication Reconciliation Updated: Yes  Influenza Vaccine Indication: Patient Refuses    I understand that a diet low in cholesterol, fat, and sodium is recommended for good health. Unless I have been given specific instructions below for another diet, I accept this instruction as my diet prescription.   Other diet: Diabetic    Special Instructions: None    · Is patient discharged on Warfarin / Coumadin?   No      Community-Acquired Pneumonia, Adult  Pneumonia is an infection of the lungs. There are different types of pneumonia. One type can develop while a person is in a hospital. A different type, called community-acquired pneumonia, develops in people who are not, or have not recently been, in the hospital or other health care facility.  What are the causes?  Pneumonia may be caused by bacteria, viruses, or funguses. Community-acquired pneumonia is often caused by Streptococcus pneumonia bacteria. These bacteria are often passed from one person to another by breathing in droplets from the cough or sneeze of an infected person.  What increases the risk?  The condition is more likely to develop in:  · People who have chronic diseases, such as chronic obstructive pulmonary disease (COPD), asthma, congestive heart failure, cystic fibrosis, diabetes, or kidney disease.  · People who have early-stage or late-stage HIV.  · People who have sickle cell disease.  · People who have had their spleen removed (splenectomy).  · People who have poor dental  hygiene.  · People who have medical conditions that increase the risk of breathing in (aspirating) secretions their own mouth and nose.  · People who have a weakened immune system (immunocompromised).  · People who smoke.  · People who travel to areas where pneumonia-causing germs commonly exist.  · People who are around animal habitats or animals that have pneumonia-causing germs, including birds, bats, rabbits, cats, and farm animals.  What are the signs or symptoms?  Symptoms of this condition include:  · A dry cough.  · A wet (productive) cough.  · Fever.  · Sweating.  · Chest pain, especially when breathing deeply or coughing.  · Rapid breathing or difficulty breathing.  · Shortness of breath.  · Shaking chills.  · Fatigue.  · Muscle aches.  How is this diagnosed?  Your health care provider will take a medical history and perform a physical exam. You may also have other tests, including:  · Imaging studies of your chest, including X-rays.  · Tests to check your blood oxygen level and other blood gases.  · Other tests on blood, mucus (sputum), fluid around your lungs (pleural fluid), and urine.  If your pneumonia is severe, other tests may be done to identify the specific cause of your illness.  How is this treated?  The type of treatment that you receive depends on many factors, such as the cause of your pneumonia, the medicines you take, and other medical conditions that you have. For most adults, treatment and recovery from pneumonia may occur at home. In some cases, treatment must happen in a hospital. Treatment may include:  · Antibiotic medicines, if the pneumonia was caused by bacteria.  · Antiviral medicines, if the pneumonia was caused by a virus.  · Medicines that are given by mouth or through an IV tube.  · Oxygen.  · Respiratory therapy.  Although rare, treating severe pneumonia may include:  · Mechanical ventilation. This is done if you are not breathing well on your own and you cannot maintain a  safe blood oxygen level.  · Thoracentesis. This procedure removes fluid around one lung or both lungs to help you breathe better.  Follow these instructions at home:  · Take over-the-counter and prescription medicines only as told by your health care provider.  ¨ Only take cough medicine if you are losing sleep. Understand that cough medicine can prevent your body’s natural ability to remove mucus from your lungs.  ¨ If you were prescribed an antibiotic medicine, take it as told by your health care provider. Do not stop taking the antibiotic even if you start to feel better.  · Sleep in a semi-upright position at night. Try sleeping in a reclining chair, or place a few pillows under your head.  · Do not use tobacco products, including cigarettes, chewing tobacco, and e-cigarettes. If you need help quitting, ask your health care provider.  · Drink enough water to keep your urine clear or pale yellow. This will help to thin out mucus secretions in your lungs.  How is this prevented?  There are ways that you can decrease your risk of developing community-acquired pneumonia. Consider getting a pneumococcal vaccine if:  · You are older than 65 years of age.  · You are older than 19 years of age and are undergoing cancer treatment, have chronic lung disease, or have other medical conditions that affect your immune system. Ask your health care provider if this applies to you.  There are different types and schedules of pneumococcal vaccines. Ask your health care provider which vaccination option is best for you.  You may also prevent community-acquired pneumonia if you take these actions:  · Get an influenza vaccine every year. Ask your health care provider which type of influenza vaccine is best for you.  · Go to the dentist on a regular basis.  · Wash your hands often. Use hand  if soap and water are not available.  Contact a health care provider if:  · You have a fever.  · You are losing sleep because you  cannot control your cough with cough medicine.  Get help right away if:  · You have worsening shortness of breath.  · You have increased chest pain.  · Your sickness becomes worse, especially if you are an older adult or have a weakened immune system.  · You cough up blood.  This information is not intended to replace advice given to you by your health care provider. Make sure you discuss any questions you have with your health care provider.  Document Released: 12/18/2006 Document Revised: 04/27/2017 Document Reviewed: 04/13/2016  "[employer] Interactive Patient Education © 2017 "[employer]          Depression / Suicide Risk    As you are discharged from this [facility] facility, it is important to learn how to keep safe from harming yourself.    Recognize the warning signs:  · Abrupt changes in personality, positive or negative- including increase in energy   · Giving away possessions  · Change in eating patterns- significant weight changes-  positive or negative  · Change in sleeping patterns- unable to sleep or sleeping all the time   · Unwillingness or inability to communicate  · Depression  · Unusual sadness, discouragement and loneliness  · Talk of wanting to die  · Neglect of personal appearance   · Rebelliousness- reckless behavior  · Withdrawal from people/activities they love  · Confusion- inability to concentrate     If you or a loved one observes any of these behaviors or has concerns about self-harm, here's what you can do:  · Talk about it- your feelings and reasons for harming yourself  · Remove any means that you might use to hurt yourself (examples: pills, rope, extension cords, firearm)  · Get professional help from the community (Mental Health, Substance Abuse, psychological counseling)  · Do not be alone:Call your Safe Contact- someone whom you trust who will be there for you.  · Call your local CRISIS HOTLINE 126-5725 or 530-999-2341  · Call your local Children's Mobile Crisis Response Team  [facility] (716) 380-9762 or www.[employer]  · Call the toll free National Suicide Prevention Hotlines   · National Suicide Prevention Lifeline 844-944-WUVI (2271)  · National Hope Line Network 800-SUICIDE (205-7493)

## 2018-06-09 NOTE — RESPIRATORY CARE
COPD Education by COPD Clinical Educator  6/9/2018 at 11:03 AM by Sanjana Dutton    Patient interviewed by COPD education team.  Patient refused full COPD program at this time, but agreed to short intervention.  A comprehensive packet including information about COPD, treatments, and smoking cessation given.

## 2018-06-09 NOTE — PROGRESS NOTES
Discharge instructions given to patient. Prescriptions sent to patient's pharmacy. Discharge instructions given to patient at bedside, verbalizes understanding and states plans for follow-up with PCP. New and home medication review, post-discharge activity level and worsening of symptoms needing follow-up care discussed. Telemetry monitor/IV cathlon removed. All belongings accounted for, all questions answered at this time. Patient awaiting family at 1500 to transport her home. No distress noted. Alert and oriented.

## 2018-06-09 NOTE — DISCHARGE SUMMARY
Discharge Summary    CHIEF COMPLAINT ON ADMISSION  Chief Complaint   Patient presents with   • Chest Pain   • Shortness of Breath       Reason for Admission  EMS     Admission Date  6/6/2018    CODE STATUS  Full Code    HPI & HOSPITAL COURSE  Please see H&P dictated by Dr. Olivares for further details. This is a 52 y.o. female here with chest pain and shortness of breath secondary to community acquired pneumonia. Patient was monitored on telemetry and her troponins were trended. Patient's troponin ×3 were negative. Patient underwent nuclear stress test which was negative for reversible ischemia. Patient had echocardiogram which showed ejection fraction of 65%, no significant valvular abnormalities were noted. Patient underwent a CTA chest which was negative for pulmonary embolism and aortic dissection. Patient was treated with empiric antibiotics and patient's cultures are negative today. Patient to follow-up with primary care provider for final blood culture results. She is discharged home with [employer] for a total of 5 days to complete her antibiotic course. Patient's chest pain is felt to be secondary to community acquired pneumonia and musculoskeletal in nature. Patient has ongoing tobacco use and smoking cessation counseling was provided during her hospital stay.       Therefore, she is discharged in good and stable condition to home with close outpatient follow-up.    The patient met 2-midnight criteria for an inpatient stay at the time of discharge.    Discharge Date  6/9/18    FOLLOW UP ITEMS POST DISCHARGE  Final blood culture results with PCP    DISCHARGE DIAGNOSES  Principal Problem (Resolved):    Chest pain POA: Unknown  Active Problems:    Tobacco dependence POA: Yes    Opiate dependence (CMS-[facility]) POA: Yes    COPD (chronic obstructive pulmonary disease) ([facility]) POA: Yes    Type 1 diabetes mellitus with complication ([facility]) POA: Yes    SYBIL (obstructive sleep apnea) POA: Yes    Carpal tunnel syndrome of right  wrist POA: Yes    Anxiety POA: Yes    Rheumatoid arthritis (HCC) POA: Yes    POTS (postural orthostatic tachycardia syndrome) POA: Yes  Resolved Problems:    Sepsis (HCC) POA: Unknown    Tachycardia POA: Unknown      FOLLOW UP  Future Appointments  Date Time Provider Department Center   6/15/2018 9:00 AM [address]   6/19/2018 2:50 PM Renate Gr M.D. [facility]   6/20/2018 1:40 PM [facility] None   7/24/2018 8:00 AM [facility]   9/19/2018 10:40 AM Saleem Win M.D. [facility] None     No follow-up provider specified.    MEDICATIONS ON DISCHARGE     Medication List      START taking these medications      Instructions   cefdinir 300 MG Caps  Commonly known as:  OMNICEF   Take 1 Cap by mouth 2 times a day for 5 days.  Dose:  300 mg     ibuprofen 800 MG Tabs  Commonly known as:  MOTRIN   Take 1 Tab by mouth 3 times a day.  Dose:  800 mg        CONTINUE taking these medications      Instructions   BELSOMRA 15 MG Tabs  Generic drug:  Suvorexant   Take 1 Tab by mouth at bedtime as needed.  Dose:  1 Tab     calcitRIOL 0.25 MCG Caps  Commonly known as:  ROCALTROL   Take 0.25 mcg by mouth every 72 hours.  Dose:  0.25 mcg     carisoprodol 350 MG Tabs  Commonly known as:  SOMA   Take 1 Tab by mouth 4 times a day for 30 days.  Dose:  350 mg     cloNIDine 0.1 MG Tabs  Commonly known as:  CATAPRES   Take 1 Tab by mouth at bedtime as needed (insomnia).  Dose:  0.1 mg     cyclobenzaprine 10 MG Tabs  Commonly known as:  FLEXERIL   Take 10 mg by mouth 3 times a day as needed for Muscle Spasms.  Dose:  10 mg     diazepam 10 MG tablet  Commonly known as:  VALIUM   Take 1 Tab by mouth every 6 hours as needed for Anxiety for up to 30 days.  Dose:  10 mg     gabapentin 600 MG tablet  Commonly known as:  NEURONTIN   Take 1,200 mg by mouth as needed.  Dose:  1200 mg     HYDROcodone Bitartrate 50 MG C12a   Take 1 Tab by mouth every 12 hours. For Breakthrough pain  Dose:   1 Tab     HYDROcodone/acetaminophen  MG Tabs  Commonly known as:  NORCO   Take 2 Tabs by mouth 4 times a day.  Dose:  2 Tab     Insulin Glargine 300 UNIT/ML Sopn  Commonly known as:  TOUJEO SOLOSTAR   Inject 28 Units as instructed every bedtime.  Dose:  28 Units     insulin lispro 100 UNIT/ML Soln  Commonly known as:  HUMALOG   Inject 1-5 Units as instructed 3 times a day before meals. Sliding scale  Dose:  1-5 Units        STOP taking these medications    ciprofloxacin 500 MG Tabs  Commonly known as:  CIPRO            Allergies  Allergies   Allergen Reactions   • Compazine      Aggressive behavior    • Sulfa Drugs Itching     Blotchy spots on chest       DIET  Orders Placed This Encounter   Procedures   • Diet Order     Standing Status:   Standing     Number of Occurrences:   1     Order Specific Question:   Diet:     Answer:   Regular [1]       ACTIVITY  As tolerated.      CONSULTATIONS  None    PROCEDURES  None     LABORATORY  Lab Results   Component Value Date    SODIUM 138 06/08/2018    POTASSIUM 3.6 06/08/2018    CHLORIDE 107 06/08/2018    CO2 22 06/08/2018    GLUCOSE 120 (H) 06/08/2018    BUN 15 06/08/2018    CREATININE 0.93 06/08/2018    CREATININE 0.8 12/13/2008        Lab Results   Component Value Date    WBC 9.8 06/08/2018    HEMOGLOBIN 9.4 (L) 06/08/2018    HEMATOCRIT 29.3 (L) 06/08/2018    PLATELETCT 133 (L) 06/08/2018      This dictation was created using voice recognition software. The accuracy of the dictation is limited to the abilities of the software. I expect there may be some errors of grammar and possibly content.    Total time of the discharge process exceeds 39 minutes.

## 2018-06-09 NOTE — CARE PLAN
Problem: Pain Management  Goal: Pain level will decrease to patient's comfort goal    Intervention: Follow pain managment plan developed in collaboration with patient and Interdisciplinary Team  RN will assess patient's pain level and implement pain strategies according to MAR. Rn will also educate patient regarding both nonpharmacologic and pharmacologic pain strategies.         Problem: Respiratory:  Goal: Respiratory status will improve    Intervention: Educate and encourage coughing and deep breathing  RN will encourage patient to cough and deep breath to improve pulmonary status.

## 2018-06-09 NOTE — PROGRESS NOTES
Report received, pt care assumed, tele box on. VSS, pt assessment complete. Pt aaox4, no signs of distress noted at this time. POC discussed with pt and verbalizes no questions. Pt denies chest pain, pain or sob at this time.  Pt denies any additional needs at this time. Bed in lowest position, bed alarm off, pt educated on fall risk and verbalized understanding, call light within reach, will continue to monitor.

## 2018-06-09 NOTE — PROGRESS NOTES
RN continued to follow pain management strategies for patient according to MAR throughout evening. Patient's pain level managed through alternating pain medications. Patient declines any further needs at his time. Call light within reach. Fall precautions in place.

## 2018-06-10 ENCOUNTER — PATIENT OUTREACH (OUTPATIENT)
Dept: HEALTH INFORMATION MANAGEMENT | Facility: OTHER | Age: 53
End: 2018-06-10

## 2018-06-20 ENCOUNTER — OFFICE VISIT (OUTPATIENT)
Dept: PULMONOLOGY | Facility: HOSPICE | Age: 53
End: 2018-06-20
Payer: MEDICAID

## 2018-06-20 VITALS
RESPIRATION RATE: 16 BRPM | WEIGHT: 149 LBS | TEMPERATURE: 98.6 F | SYSTOLIC BLOOD PRESSURE: 140 MMHG | DIASTOLIC BLOOD PRESSURE: 72 MMHG | BODY MASS INDEX: 24.83 KG/M2 | HEART RATE: 98 BPM | OXYGEN SATURATION: 94 % | HEIGHT: 65 IN

## 2018-06-20 DIAGNOSIS — J44.9 CHRONIC OBSTRUCTIVE PULMONARY DISEASE, UNSPECIFIED COPD TYPE (HCC): ICD-10-CM

## 2018-06-20 DIAGNOSIS — G47.33 OSA (OBSTRUCTIVE SLEEP APNEA): ICD-10-CM

## 2018-06-20 DIAGNOSIS — R93.89 ABNORMAL CAT SCAN: ICD-10-CM

## 2018-06-20 DIAGNOSIS — R09.02 HYPOXIA: ICD-10-CM

## 2018-06-20 PROCEDURE — 99214 OFFICE O/P EST MOD 30 MIN: CPT | Performed by: NURSE PRACTITIONER

## 2018-06-20 RX ORDER — ALBUTEROL SULFATE 90 UG/1
2 AEROSOL, METERED RESPIRATORY (INHALATION) EVERY 4 HOURS PRN
Qty: 1 INHALER | Refills: 1 | Status: SHIPPED | OUTPATIENT
Start: 2018-06-20 | End: 2018-08-06 | Stop reason: SDUPTHER

## 2018-06-20 NOTE — PATIENT INSTRUCTIONS
1) Smoking cessation program referral   2) Sleep study and bring own Belsomra 15mg to sleep study.   3) Add Mucinex to help with mucus clearance  4) Zpack and medrol pack   5) Vaccines: Up to date with Pneumovax 23  6) Start rescue as needed for shortness of breath and/or wheeze  7) Return in about 2 months (around 8/20/2018) for review of symptoms, if not sooner, follow up with KHAI Anderson, sleep study results, Chest x-ray.

## 2018-06-20 NOTE — PROGRESS NOTES
CC:  Here for f/u pulmonary issues as listed below    HPI:   Yuki presents today for follow up for hx of abnormal CT scan and hospital f/u.     Patient was hospitalized June 6 and discharged June 9 for CAP and chest pain.  Troponins ×3 were negative, stress test negative.  Echocardiogram from June 2018 showed ejection fraction of 65%.  CTA was completed negative for PE.  Multifocal consolidation.  She was treated IV antibiotics and steroids and discharged home in stable condition on oral antibiotics. Since discharge from the hospital she is decreased her cigarettes from 2 packs a day down to 8 cigarettes a day.    Patient reports improved shortness of breath since discharge from the hospital, she continues to have a cough with clear and white phlegm.  She has been walking more and trying to push through some of the fatigue she still continues to feel since discharge.  She is not taking any inhalers at this time.    PFTs from 1/2017 indicate a Fev1 of 2.29L or 79% predicted with a mild bronchodilator response, Fev1/FVC ratio of 78, DLCO 97%. Patient originally was referred after an abnormal chest x-ray was completed. CAT scan of the chest 1/2017 confirmed a 2.2 cm left lower lobe spiculated mass and borderline left infrahilar adenopathy, 1.2 cm triangular shaped soft tissue anterior superior mediastinum consistent with residual thymic tissue but thymic neoplasm not excluded, mild splenomegaly.    A PET scan was performed 1- showing no evidence of abnormal FDG accumulation, smaller nodule opacity in left lower lobe does not demonstrate uptake, likely resolving infection/inflammation.  Most recent CAT scan from 4/28/2017 showed consolidation in the left lower pulmonary nodule is no longer identified. Resolution of pneumonia is a possibility. Borderline prominent thymic tissue again noted without change, mildly enlarged spleen again identified.    Patient has concerns that she has sleep apnea.  While she was  in the hospital she was told her oxygen dipped into the 70s while she slept.  Patient is currently sleeping 10 hours per night with 4x nighttime awakenings. They have trouble falling asleep. She will occasionally take Belsomra 15mg with benefit. She uses chronic pain meds.    They do not feel refreshed in the morning and denies morning H/A. They feel tired throughout the day and denies naps.  Patient denies snoring, apnea events and paroxysmal nocturnal dyspnea events. They have never fallen asleep in conversation, at the wheel, or at work.  They deny sleepwalking/talking.         Patient Active Problem List    Diagnosis Date Noted   • Hypoxia 03/13/2014     Priority: High   • Tobacco dependence 08/26/2013     Priority: Low   • Fibromyalgia 08/23/2013     Priority: Low   • Abnormal CAT scan 06/21/2018   • Muscle spasm 05/24/2018   • POTS (postural orthostatic tachycardia syndrome) 05/24/2018   • History of TIA (transient ischemic attack) 05/24/2018   • SYBIL (obstructive sleep apnea) 05/23/2018   • Recurrent UTI 05/23/2018   • Carpal tunnel syndrome of right wrist 05/23/2018   • Ovarian cyst 05/23/2018   • Drug-induced constipation 05/23/2018   • Insomnia 05/23/2018   • Anxiety 05/23/2018   • Rheumatoid arthritis ([facility]) 05/23/2018   • Chronic pain of both knees 05/23/2018   • Type 1 diabetes mellitus with complication ([facility]) 11/27/2017   • Benzodiazepine dependence ([facility]) 11/27/2017   • Painful diabetic neuropathy ([facility]) 08/02/2017   • COPD (chronic obstructive pulmonary disease) ([facility]) 01/05/2017   • Opiate dependence (CMS-HCC) 11/17/2013       Past Medical History:   Diagnosis Date   • Arthritis    • Cataract    • COPD (chronic obstructive pulmonary disease) ([facility])    • Diabetes     juvenile, type I   • Diabetic neuropathy ([facility])    • Diabetic retinopathy    • Diabetic retinopathy ([facility])    • Fibromyalgia    • Hypertension    • Migraine    • POTS (postural orthostatic tachycardia syndrome)     treated with hydrocodone   •  Recurrent UTI    • Rheumatoid arthritis (HCC)    • Rheumatoid arthritis (HCC)    • Sleep apnea     cpap ordered, doesn't use   • TIA (transient ischemic attack)        Past Surgical History:   Procedure Laterality Date   • GASTROSCOPY WITH BALLOON DILATATION  2/13/2015    Performed by Venancio Aburto M.D. at SURGERY [facility]   • GASTROSCOPY WITH BIOPSY  2/13/2015    Performed by Venancio Aburto M.D. at SURGERY [facility]   • FOREIGN BODY REMOVAL  6/18/2013    Performed by Raz Mari M.D. at SURGERY SAME DAY [facility]   • CHOLECYSTECTOMY     • OTHER      Skin disorder of unknown name   • PRIMARY C SECTION     • TONSILLECTOMY         Family History   Problem Relation Age of Onset   • No Known Problems Sister    • No Known Problems Brother    • No Known Problems Brother    • No Known Problems Daughter    • No Known Problems Daughter        Social History   Substance Use Topics   • Smoking status: Current Every Day Smoker     Packs/day: 0.50     Years: 29.00     Types: Cigarettes   • Smokeless tobacco: Never Used      Comment: 8 cigarettes a day    • Alcohol use No       Current Outpatient Prescriptions   Medication Sig Dispense Refill   • albuterol (PROAIR HFA) 108 (90 Base) MCG/ACT Aero Soln inhalation aerosol Inhale 2 Puffs by mouth every four hours as needed for Shortness of Breath (wheezing). 1 Inhaler 1   • calcitRIOL (ROCALTROL) 0.25 MCG Cap Take 0.25 mcg by mouth every 72 hours.     • cyclobenzaprine (FLEXERIL) 10 MG Tab Take 10 mg by mouth 3 times a day as needed for Muscle Spasms.     • gabapentin (NEURONTIN) 600 MG tablet Take 1,200 mg by mouth as needed.     • HYDROcodone/acetaminophen (NORCO)  MG Tab Take 2 Tabs by mouth 4 times a day.     • Insulin Glargine (TOUJEO SOLOSTAR) 300 UNIT/ML Solution Pen-injector Inject 28 Units as instructed every bedtime.     • insulin lispro (HUMALOG) 100 UNIT/ML Solution Inject 1-5 Units as instructed 3 times a day before meals.  "Sliding scale     • diazepam (VALIUM) 10 MG tablet Take 1 Tab by mouth every 6 hours as needed for Anxiety for up to 30 days. 120 Tab 0   • cloNIDine (CATAPRES) 0.1 MG Tab Take 1 Tab by mouth at bedtime as needed (insomnia).     • HYDROcodone Bitartrate 50 MG Capsule Extended Release 12 hour Abuse-Deterrent Take 1 Tab by mouth every 12 hours. For Breakthrough pain     • ibuprofen (MOTRIN) 800 MG Tab Take 1 Tab by mouth 3 times a day. (Patient not taking: Reported on 6/20/2018) 15 Tab 0   • carisoprodol (SOMA) 350 MG Tab Take 1 Tab by mouth 4 times a day for 30 days. (Patient not taking: Reported on 6/20/2018) 120 Tab 0     No current facility-administered medications for this visit.           Allergies: Compazine and Sulfa drugs          ROS   Gen: Denies fever, chills, unintentional weight loss, fatigue, night sweats  E/N/T: Denies nasal congestion, ear pain  Resp:Denies wheezing, , hemoptysis  CV: Denies chest pain, chest tightness, palpitations  Sleep:Denies morning headache  Neuro: Denies frequent headaches, weakness, dizziness  GI: Denies acid reflux, N/V  See HPI.  All other systems reviewed and negative          Vital signs for this encounter:  Vitals:    06/20/18 1346   Height: 1.651 m (5' 5\")   Weight: 67.6 kg (149 lb)   Weight % change since last entry.: 0 %   BP: 140/72   Pulse: 98   BMI (Calculated): 24.79   Resp: 16   Temp: 37 °C (98.6 °F)   O2 sat % room air: 94 %                 Physical Exam:   Appearance: well developed, well nourished, no acute distress.   Eyes: PERRL, EOM intact, sclere white, conjunctiva moist.  Ears: no lesions or deformities.  Hearing: grossly intact.  Nose: no lesions or deformities.  Dentition: good dentition.   Oropharynx: tongue normal, posterior pharynx without erythema or exudate.  Neck: supple, trachea midline, no masses.  Respiratory effort: no intercostal retractions or use of accessory muscles.  Lung auscultation: Bilateral diminished   Heart auscultation: no murmur, " rub, or gallop   Extremities: no cyanosis or edema.  Abdomen: soft, non-tender, no masses.  Gait and station: without difficulty   Digits and Nails: no clubbing, cyanosis, petechiae, or nodes.  Cranial nerves: grossly normal.  Motor: no focal deficits observed.   Skin: no rashes, lesions, or ulcers noted.  Orientation: oriented to time, place, and person.  Mood and affect: mood and affect appropriate, normal interaction with examiner.      Assessment   1. Hypoxia  POLYSOMNOGRAPHY, 4 OR MORE    CANCELED: POLYSOMNOGRAPHY, 4 OR MORE   2. Abnormal CAT scan  DX-CHEST-2 VIEWS   3. SYBIL (obstructive sleep apnea)     4. Chronic obstructive pulmonary disease, unspecified COPD type (HCC)         Patient was seen for 35 minutes, more than 50% of time spent in face to face review, counseling, and arranging future evaluation and follow up of medical conditions and care relating to COPD and review of hospital stay records. Patient is clinically stable and will have the following changes per plan. She has chronic pain and takes medication daily.  She may require ASV therapy due to chronic pain med use.    PLAN:   Patient Instructions   1) Smoking cessation program referral   2) Sleep study and bring own Belsomra 15mg to sleep study.   3) Add Mucinex to help with mucus clearance  4) Zpack and medrol pack on hand for emergency  5) Vaccines: Up to date with Pneumovax 23  6) Start rescue as needed for shortness of breath and/or wheeze  7) Return in about 2 months (around 8/20/2018) for review of symptoms, if not sooner, follow up with KHAI Anderson, sleep study results, Chest x-ray. - will update CXR next OV for comparison to CT while hospitalized

## 2018-06-21 PROBLEM — R93.89 ABNORMAL CAT SCAN: Status: ACTIVE | Noted: 2018-06-21

## 2018-07-20 ENCOUNTER — SLEEP STUDY (OUTPATIENT)
Dept: SLEEP MEDICINE | Facility: MEDICAL CENTER | Age: 53
End: 2018-07-20
Payer: MEDICAID

## 2018-07-20 DIAGNOSIS — R09.02 HYPOXIA: ICD-10-CM

## 2018-07-20 PROCEDURE — 95810 POLYSOM 6/> YRS 4/> PARAM: CPT | Performed by: INTERNAL MEDICINE

## 2018-07-23 NOTE — PROCEDURES
Clinical Comments:  The patient underwent a comprehensive polysomnogram using the standard montage for measurement of parameters of sleep, respiratory events, movement abnormalities, heart rate and rhythm. A microphone was used to monitor snoring.      INTERPRETATION:  The study was started at 10:38pm.  The total recording time was 443.2 minutes with a sleep period of 386.8 minutes and the total sleep time was 339.8 minutes with a sleep efficiency of 76.7%.  The sleep latency was 56.4 minutes, and REM latency was 116.5 minutes.  The patient experienced 8 arousals in total, for an arousal index of 1.4    RESPIRATORY: The patient had 5 apneas in total.  Of these, 2 were obstructive apneas, and 3 were central apneas.  This resulted in an apnea index (AI) of 0.9.  The patient had 8 hypopneas, for a hypopnea index of 1.4.  The overall AHI was 2.3, while the AHI during Stage R sleep was 4.1.  AHI while supine was 3.9.    OXIMETRY: Oxygen saturation monitoring showed a mean SpO2 of 87.0%, with a minimum oxygen saturation of 81.0%.  Oxygen saturations were less than or = 89% for 322.7 minutes of sleep time.    CARDIAC: The highest heart rate during the recording was 115.0 beats per minute.  The average heart rate during sleep was 72.4 bpm.    LIMB MOVEMENTS: There were a total of 2 PLMs during sleep, of which 0 were PLMs arousals.  This resulted in a PLMS index of 0.4.    .RECORDING TECHNIQUE:       After the scalp was prepared, gold plated electrodes were applied to the scalp according to the International 10-20 System. EEG (electroencephalogram) was continuously monitored from the O1-M2, O2-M1, C3-M2, C4-M1, F3-M2, and F4-M1.   EOGs (electrooculograms) were monitored by electrodes placed at the left and right outer canthi.  Chin EMG (electromyogram) was monitored by electrodes placed on the mentalis and sub-mentalis muscles.  Nasal and oral airflow were monitored using a triple port thermocouple as well as oronasal  pressure transducer.  Respiratory effort was measured by inductive plethysmography technology employing abdominal and thoracic belts.  Blood oxygen saturation and pulse were monitored by pulse oximetry.  Heart rhythm was monitored by surface electrocardiogram.  Leg EMG was studied using surface electrodes placed on left and right anterior tibialis.  A microphone was used to monitor tracheal sounds and snoring.  Body position was monitored and documented by technician observation      SUMMARY:    This was an overnight diagnostic polysomnogram with a possible subsequent positive airway pressure titration.  However, the patient did not meet the split-night protocol.    During the diagnostic phase the total recording time was 443.2 minutes, the sleep period time was 386.8 minutes, and the total sleep time was 339.8 minutes.  The patient's sleep efficiency was 76.7 % which is somewhat reduced.  The patient experienced 2 REM periods.    The sleep stage durations revealed 47 minutes of wake after sleep onset (WASO), 8.5 minutes of N1 sleep, 297.3 minutes of N2 sleep, 5 minutes of N3 sleep, and 29 minutes of REM.    The latency to sleep was 56.4 minutes which is prolonged.  The latency to REM was 160.5 minutes which is normal.  Minimal sleep fragmentation occurred.  The arousal index was 1.4.  The Total Limb Movement Index was 0.4, the Limb Movement with Arousal Index was 0.0, and the PLM Series Index was 0.0.    The patient experienced 13 apneas and hypopneas and 0 RERAS.  The apnea hypopnea index was 2.3, the RDI was 2.3, the mean event duration was 18.5 seconds, and the longest event lasted 32.3 seconds.  The REM index was 4.1 and the supine index was 3.9.  The apnea hypopnea index represents no significant obstructive sleep apnea.    The kay saturation during sleep was 81 % and the patient spent 95.7 % of the recording with saturations less than or equal to 90%.    During sleep, the minimum heart rate was artifactual  , the mean heart rate was 75.4 bpm, and the maximum heart rate was 115.0 bpm.    ASSESSMENT:    No significant obstructive sleep apnea hypopnea - AHI 2.3  Mild to moderate and persistent nocturnal desaturation - kay saturation 81 % - saturations <90% for 95.7% of the recording        RECOMMENDATION:    The patient does not have significant sleep disordered breathing.  There is no indication for positive airway pressure.  The patient is a candidate for supplemental nocturnal oxygen therapy.  Clinical correlation is necessary.

## 2018-08-06 DIAGNOSIS — J44.9 CHRONIC OBSTRUCTIVE PULMONARY DISEASE, UNSPECIFIED COPD TYPE (HCC): ICD-10-CM

## 2018-08-06 RX ORDER — ALBUTEROL SULFATE 90 UG/1
AEROSOL, METERED RESPIRATORY (INHALATION)
Qty: 1 INHALER | Refills: 2 | Status: SHIPPED | OUTPATIENT
Start: 2018-08-06 | End: 2022-08-18

## 2018-08-06 NOTE — TELEPHONE ENCOUNTER
Have we ever prescribed this med? Yes.  If yes, what date? 06/20/2018    Last OV: 06/20/2018 - Clarissa Tompkins    Next OV: 08/24/2018 - Clarissa Tompkins    DX: COPD    Medications: Albuterol

## 2018-08-24 ENCOUNTER — APPOINTMENT (OUTPATIENT)
Dept: RADIOLOGY | Facility: IMAGING CENTER | Age: 53
End: 2018-08-24
Attending: NURSE PRACTITIONER
Payer: MEDICAID

## 2018-09-05 ENCOUNTER — APPOINTMENT (OUTPATIENT)
Dept: PULMONOLOGY | Facility: HOSPICE | Age: 53
End: 2018-09-05
Payer: MEDICAID

## 2018-09-05 ENCOUNTER — APPOINTMENT (OUTPATIENT)
Dept: RADIOLOGY | Facility: IMAGING CENTER | Age: 53
End: 2018-09-05
Attending: NURSE PRACTITIONER
Payer: MEDICAID

## 2018-11-20 ENCOUNTER — TELEPHONE (OUTPATIENT)
Dept: MEDICAL GROUP | Facility: MEDICAL CENTER | Age: 53
End: 2018-11-20

## 2018-11-20 RX ORDER — VALACYCLOVIR HYDROCHLORIDE 1 G/1
1000 TABLET, FILM COATED ORAL DAILY
Qty: 5 TAB | Refills: 0 | Status: SHIPPED | OUTPATIENT
Start: 2018-11-20 | End: 2018-11-25

## 2018-11-21 NOTE — TELEPHONE ENCOUNTER
Pt is requesting valtrex and cream for a genital herpes out break, Pt states she does not have many out breaks but she is currently experiencing extreme amounts of discomfort. Pt check with her endocrinology Dr and he referred her to her PCP. She is not sure what the nmae of the cream is but knows that it helps with discomfort. Please advise.

## 2018-11-21 NOTE — TELEPHONE ENCOUNTER
Called Pt to advise of medication, Pt stated she was able to have her endo Dr give her an rx for the medication. Pt no longer needing medication at this time. Pt then promptly hung up when I attempted to talk with her further.

## 2018-11-21 NOTE — TELEPHONE ENCOUNTER
Please let patient know I have sent over a prescription for Valtrex 1 g daily for 5 days which is the treatment dose for a recurrent outbreak.  It usually works the best if we catch symptoms within 1 day, so since it has been a few days, it may not work quite as well.  I do not know what the cream is.  If she can find the name of that for me I am not over as well.    Renate Gr M.D.

## 2019-01-15 ENCOUNTER — TELEPHONE (OUTPATIENT)
Dept: MEDICAL GROUP | Facility: MEDICAL CENTER | Age: 54
End: 2019-01-15

## 2019-01-16 NOTE — TELEPHONE ENCOUNTER
VOICEMAIL  1. Caller Name: pt                      Call Back Number: [phone] (home)       2. Message: pt called In to make an appt. States she has a lump or bump that she would like to see if she can have it removed. Also arm cramps and referral to wound care for her toe, states it is black.    3. Patient approves office to leave a detailed voicemail/MyChart message: yes

## 2019-01-25 ENCOUNTER — APPOINTMENT (OUTPATIENT)
Dept: RADIOLOGY | Facility: IMAGING CENTER | Age: 54
End: 2019-01-25
Attending: NURSE PRACTITIONER
Payer: MEDICAID

## 2019-01-25 ENCOUNTER — OFFICE VISIT (OUTPATIENT)
Dept: PULMONOLOGY | Facility: HOSPICE | Age: 54
End: 2019-01-25
Payer: MEDICAID

## 2019-01-25 VITALS
RESPIRATION RATE: 16 BRPM | HEIGHT: 65 IN | DIASTOLIC BLOOD PRESSURE: 70 MMHG | HEART RATE: 81 BPM | OXYGEN SATURATION: 94 % | SYSTOLIC BLOOD PRESSURE: 122 MMHG | TEMPERATURE: 98.2 F | BODY MASS INDEX: 23.99 KG/M2 | WEIGHT: 144 LBS

## 2019-01-25 DIAGNOSIS — R93.89 ABNORMAL CAT SCAN: ICD-10-CM

## 2019-01-25 DIAGNOSIS — F17.200 TOBACCO DEPENDENCE: ICD-10-CM

## 2019-01-25 DIAGNOSIS — Z71.6 TOBACCO ABUSE COUNSELING: ICD-10-CM

## 2019-01-25 DIAGNOSIS — G47.34 NOCTURNAL OXYGEN DESATURATION: ICD-10-CM

## 2019-01-25 DIAGNOSIS — J44.9 CHRONIC OBSTRUCTIVE PULMONARY DISEASE, UNSPECIFIED COPD TYPE (HCC): ICD-10-CM

## 2019-01-25 PROBLEM — G47.33 OSA (OBSTRUCTIVE SLEEP APNEA): Status: RESOLVED | Noted: 2018-05-23 | Resolved: 2019-01-25

## 2019-01-25 PROCEDURE — 71046 X-RAY EXAM CHEST 2 VIEWS: CPT | Performed by: INTERNAL MEDICINE

## 2019-01-25 PROCEDURE — 99214 OFFICE O/P EST MOD 30 MIN: CPT | Mod: 25 | Performed by: NURSE PRACTITIONER

## 2019-01-25 PROCEDURE — 90471 IMMUNIZATION ADMIN: CPT | Performed by: NURSE PRACTITIONER

## 2019-01-25 PROCEDURE — 90670 PCV13 VACCINE IM: CPT | Performed by: NURSE PRACTITIONER

## 2019-01-25 RX ORDER — VALACYCLOVIR HYDROCHLORIDE 500 MG/1
500 TABLET, FILM COATED ORAL
Refills: 3 | COMMUNITY
Start: 2018-11-20 | End: 2019-12-13 | Stop reason: SDUPTHER

## 2019-01-25 RX ORDER — PROMETHAZINE HYDROCHLORIDE 25 MG/1
SUPPOSITORY RECTAL
Refills: 2 | Status: ON HOLD | COMMUNITY
Start: 2018-11-27 | End: 2023-10-01

## 2019-01-25 RX ORDER — DIAZEPAM 10 MG/1
10 TABLET ORAL EVERY 6 HOURS PRN
Refills: 5 | COMMUNITY
Start: 2019-01-07

## 2019-01-25 RX ORDER — INSULIN LISPRO 100 [IU]/ML
INJECTION, SOLUTION INTRAVENOUS; SUBCUTANEOUS
Refills: 1 | COMMUNITY
Start: 2019-01-18

## 2019-01-25 RX ORDER — SUVOREXANT 15 MG/1
TABLET, FILM COATED ORAL
Refills: 0 | COMMUNITY
Start: 2018-12-10 | End: 2022-08-18

## 2019-01-25 NOTE — PROGRESS NOTES
"CC:  Here for f/u sleep and pulmonary issues as listed below    HPI:     Yuki presents today for follow up for hx of COPD. PMH of DM1, fibromyalgia, opitate dependence, RA.       Patient originally was referred after an abnormal chest x-ray was completed. CAT scan of the chest 1/2017 confirmed a 2.2 cm left lower lobe spiculated mass and borderline left infrahilar adenopathy, 1.2 cm triangular shaped soft tissue anterior superior mediastinum consistent with residual thymic tissue but thymic neoplasm not excluded, mild splenomegaly.    A PET scan was performed 1- showing no evidence of abnormal FDG accumulation, smaller nodule opacity in left lower lobe does not demonstrate uptake, likely resolving infection/inflammation.  Most recent CAT scan from 4/28/2017 showed consolidation in the left lower pulmonary nodule is no longer identified. Resolution of pneumonia is a possibility. Borderline prominent thymic tissue again noted without change, mildly enlarged spleen again identified.    Patient was hospitalized June 2018 for CAP and chest pain.  Troponins ×3 were negative, stress test negative.  Echocardiogram from June 2018 showed ejection fraction of 65%.  CTA was completed negative for PE.  Multifocal consolidation.  Chest x-ray for follow-up completed today January 25, 2019 which I reviewed showed no pulmonary consolidation.    PFTs from 1/2017 indicate a Fev1 of 2.29L or 79% predicted with a mild bronchodilator response, Fev1/FVC ratio of 78, DLCO 97%.  She is a current everyday smoker typically less than 1 pack/day.  She was referred to the smoking cessation program, but has not followed up.  She is interested in quitting.  She has some shortness of breath with activity.  She admits she does not exercise, although encouraged.  She has a cough that she reports is a \"smoker's cough \"that produces clear mucus, occasional wheeze.  She has not required any rescue inhalers.      PSG from 7/2018 indicated an " AHI of 2.3 and low oxygenation of 81%. She spent 95.7% of the time with saturations less than 90%. She is eligible for nocturnal oxygen, which patient is amendable to. Patient is currently sleeping 10 hours per night with 4x nighttime awakenings. They have trouble falling asleep. She will occasionally take Belsomra 15mg with benefit. She uses chronic pain meds. She finds Gabapentin helps the best with sleep. They do not feel refreshed in the morning and denies morning H/A. They feel tired throughout the day.     Patient Active Problem List    Diagnosis Date Noted   • Hypoxia 03/13/2014     Priority: High   • Tobacco dependence 08/26/2013     Priority: Low   • Fibromyalgia 08/23/2013     Priority: Low   • Nocturnal oxygen desaturation 01/25/2019   • Tobacco abuse counseling 01/25/2019   • Abnormal CAT scan 06/21/2018   • Muscle spasm 05/24/2018   • POTS (postural orthostatic tachycardia syndrome) 05/24/2018   • History of TIA (transient ischemic attack) 05/24/2018   • Recurrent UTI 05/23/2018   • Carpal tunnel syndrome of right wrist 05/23/2018   • Ovarian cyst 05/23/2018   • Drug-induced constipation 05/23/2018   • Insomnia 05/23/2018   • Anxiety 05/23/2018   • Rheumatoid arthritis ([facility]) 05/23/2018   • Chronic pain of both knees 05/23/2018   • Type 1 diabetes mellitus with complication ([facility]) 11/27/2017   • Benzodiazepine dependence ([facility]) 11/27/2017   • Painful diabetic neuropathy ([facility]) 08/02/2017   • COPD (chronic obstructive pulmonary disease) ([facility]) 01/05/2017   • Opiate dependence (CMS-HCC) 11/17/2013       Past Medical History:   Diagnosis Date   • Arthritis    • Cataract    • COPD (chronic obstructive pulmonary disease) ([facility])    • Diabetes     juvenile, type I   • Diabetic neuropathy ([facility])    • Diabetic retinopathy    • Diabetic retinopathy ([facility])    • Fibromyalgia    • Hypertension    • Migraine    • POTS (postural orthostatic tachycardia syndrome)     treated with hydrocodone   • Recurrent UTI    • Rheumatoid  arthritis (HCC)    • Rheumatoid arthritis (HCC)    • Sleep apnea     cpap ordered, doesn't use   • TIA (transient ischemic attack)        Past Surgical History:   Procedure Laterality Date   • GASTROSCOPY WITH BALLOON DILATATION  2/13/2015    Performed by Venancio Aburto M.D. at SURGERY [facility]   • GASTROSCOPY WITH BIOPSY  2/13/2015    Performed by Venancio Aburto M.D. at SURGERY [facility]   • FOREIGN BODY REMOVAL  6/18/2013    Performed by Raz Mari M.D. at SURGERY SAME DAY [facility]   • CHOLECYSTECTOMY     • OTHER      Skin disorder of unknown name   • PRIMARY C SECTION     • TONSILLECTOMY         Family History   Problem Relation Age of Onset   • No Known Problems Sister    • No Known Problems Brother    • No Known Problems Brother    • No Known Problems Daughter    • No Known Problems Daughter        Social History     Social History   • Marital status:      Spouse name: N/A   • Number of children: N/A   • Years of education: N/A     Occupational History   • Not on file.     Social History Main Topics   • Smoking status: Current Every Day Smoker     Packs/day: 0.50     Years: 29.00     Types: Cigarettes   • Smokeless tobacco: Never Used      Comment: 15-18 cigarettes a day    • Alcohol use No   • Drug use: No   • Sexual activity: Not on file     Other Topics Concern   • Not on file     Social History Narrative   • No narrative on file       Current Outpatient Prescriptions   Medication Sig Dispense Refill   • ONE TOUCH ULTRA TEST strip USE TO TEST BLOOD GLUCSE FIVE TIMES DAILY.  7   • HUMALOG KWIKPEN 100 UNIT/ML Solution Pen-injector injection INJECT SC UP TO 30 UNITS PER DAY PER SLIDING SCALE.  1   • PROMETHEGAN 25 MG Suppos INSERT 1 SUPPOSITORY RECTALLY PRN NAUSEA  2   • BELSOMRA 15 MG Tab TK 1 T PO QD  0   • valACYclovir (VALTREX) 500 MG Tab Take 500 mg by mouth every day.  3   • calcitRIOL (ROCALTROL) 0.25 MCG Cap Take 0.25 mcg by mouth every 72 hours.     •  "cyclobenzaprine (FLEXERIL) 10 MG Tab Take 10 mg by mouth 3 times a day as needed for Muscle Spasms.     • gabapentin (NEURONTIN) 600 MG tablet Take 1,200 mg by mouth as needed.     • HYDROcodone/acetaminophen (NORCO)  MG Tab Take 2 Tabs by mouth 4 times a day.     • Insulin Glargine (TOUJEO SOLOSTAR) 300 UNIT/ML Solution Pen-injector Inject 28 Units as instructed every bedtime.     • cloNIDine (CATAPRES) 0.1 MG Tab Take 1 Tab by mouth at bedtime as needed (insomnia).     • HYDROcodone Bitartrate 50 MG Capsule Extended Release 12 hour Abuse-Deterrent Take 1 Tab by mouth every 12 hours. For Breakthrough pain     • diazepam (VALIUM) 10 MG tablet TK 1 T PO  EVERY 6 HOURS  5   • albuterol 108 (90 Base) MCG/ACT Aero Soln inhalation aerosol Inhale 2 puffs by mouth every 4 hours as needed for shortness of breath or wheezing  1 Inhaler 2     No current facility-administered medications for this visit.           Allergies: Compazine and Sulfa drugs      ROS   Gen: Denies fever, chills, unintentional weight loss, fatigue, night sweats  E/N/T: Denies ear pain, nasal congestion  Resp:Denies  hemoptysis  CV: Denies chest pain, chest tightness, palpitations, BLE edema  Sleep:Denies morning headache, insomnia, daytime somnolence, snoring, gasping for air, apnea  Neuro: Denies frequent headaches, weakness, dizziness  GI: Denies N/V, acid reflux/heartburn  See HPI.  All other systems reviewed and negative      Vital signs for this encounter:  Vitals:    01/25/19 1521 01/25/19 1531   Height: 1.651 m (5' 5\")    Weight: 65.3 kg (144 lb)    Weight % change since last entry.: 0 %    BP: 122/70    Pulse: 81    BMI (Calculated): 23.96    Resp: 16    Temp: 36.8 °C (98.2 °F)    TempSrc: Temporal    O2 sat % room air:  94 %                 Physical Exam:   Appearance: well developed, well nourished, no acute distress.  Eyes: PERRL, EOM intact, sclere white, conjunctiva moist.  Ears: no lesions or deformities.  Hearing: grossly " intact.  Nose: no lesions or deformities.  Dentition: good dentition.  Oropharynx: tongue normal, posterior pharynx without erythema or exudate.  Neck: supple, trachea midline, no masses.  Respiratory effort: no intercostal retractions or use of accessory muscles.  Lung auscultation: Bilateral diminished   Heart auscultation: no murmur, rub, or gallop.   Extremities: no cyanosis or edema.  Abdomen: soft, non-tender, no masses.  Gait and station: grossly normal   Digits and Nails: no clubbing, cyanosis, petechiae, or nodes.  Cranial nerves: grossly normal.  Motor: no focal deficits observed.  Skin: no rashes, lesions, or ulcers noted.  Orientation: oriented to time, place, and person.  Mood and affect: mood and affect appropriate, normal interaction with examiner.    Assessment   1. Abnormal CAT scan  REFERRAL TO TOBACCO CESSATION PROGRAM    PULMONARY FUNCTION TESTS -Test requested: Complete Pulmonary Function Test    Pneumococcal Conjugate Vaccine 13-Valent   2. Tobacco dependence  REFERRAL TO TOBACCO CESSATION PROGRAM    PULMONARY FUNCTION TESTS -Test requested: Complete Pulmonary Function Test    Pneumococcal Conjugate Vaccine 13-Valent   3. Nocturnal oxygen desaturation  DME O2 New Set Up    REFERRAL TO TOBACCO CESSATION PROGRAM    PULMONARY FUNCTION TESTS -Test requested: Complete Pulmonary Function Test    Pneumococcal Conjugate Vaccine 13-Valent   4. Chronic obstructive pulmonary disease, unspecified COPD type (HCC)  REFERRAL TO TOBACCO CESSATION PROGRAM    PULMONARY FUNCTION TESTS -Test requested: Complete Pulmonary Function Test    Pneumococcal Conjugate Vaccine 13-Valent   5. Tobacco abuse counseling         Patient was seen for 25 minutes, more than 50% of time spent in face to face review, counseling, and arranging future evaluation and follow up of medical conditions and care related to COPD, medication management, and review of CXR, starting oxygen and review of sleep study. Patient is clinically stable  and will proceed with following plan.     PLAN:   Patient Instructions   1) Smoking cessation program referral   2) Continue Belsomra 15mg to sleep study  3) Zpack and medrol pack on hand for emergency  4) Start nocturnal oxygen at 2L  5) Vaccines: Up to date with Pneumovax 23. Prevnar 13 today. Declines flu.   6) Continue rescue as needed for shortness of breath and/or wheeze    7) Return in about 6 months (around 7/25/2019) for follow up with KHAI Anderson, if not sooner, review of symptoms, pulmonary function results.

## 2019-01-25 NOTE — PATIENT INSTRUCTIONS
1) Smoking cessation program referral   2) Continue Belsomra 15mg to sleep study  3) Zpack and medrol pack on hand for emergency  4) Start nocturnal oxygen at 2L  5) Vaccines: Up to date with Pneumovax 23. Prevnar 13 today. Declines flu.   6) Continue rescue as needed for shortness of breath and/or wheeze    7) Return in about 6 months (around 7/25/2019) for follow up with KHAI Anderson, if not sooner, review of symptoms, pulmonary function results.

## 2019-02-01 ENCOUNTER — OFFICE VISIT (OUTPATIENT)
Dept: MEDICAL GROUP | Facility: MEDICAL CENTER | Age: 54
End: 2019-02-01
Attending: INTERNAL MEDICINE
Payer: MEDICAID

## 2019-02-01 VITALS
DIASTOLIC BLOOD PRESSURE: 90 MMHG | TEMPERATURE: 97.9 F | HEIGHT: 65 IN | HEART RATE: 74 BPM | SYSTOLIC BLOOD PRESSURE: 150 MMHG | OXYGEN SATURATION: 96 % | WEIGHT: 146 LBS | BODY MASS INDEX: 24.32 KG/M2 | RESPIRATION RATE: 16 BRPM

## 2019-02-01 DIAGNOSIS — K21.9 GASTROESOPHAGEAL REFLUX DISEASE, ESOPHAGITIS PRESENCE NOT SPECIFIED: ICD-10-CM

## 2019-02-01 DIAGNOSIS — N60.81 CYST OF SKIN OF RIGHT BREAST: ICD-10-CM

## 2019-02-01 PROBLEM — N60.01 CYST OF RIGHT BREAST: Status: RESOLVED | Noted: 2019-02-01 | Resolved: 2019-02-01

## 2019-02-01 PROBLEM — N60.01 CYST OF RIGHT BREAST: Status: ACTIVE | Noted: 2019-02-01

## 2019-02-01 PROBLEM — G56.01 CARPAL TUNNEL SYNDROME OF RIGHT WRIST: Status: RESOLVED | Noted: 2018-05-23 | Resolved: 2019-02-01

## 2019-02-01 PROCEDURE — 99214 OFFICE O/P EST MOD 30 MIN: CPT | Performed by: INTERNAL MEDICINE

## 2019-02-01 PROCEDURE — 99212 OFFICE O/P EST SF 10 MIN: CPT | Performed by: INTERNAL MEDICINE

## 2019-02-01 RX ORDER — RANITIDINE 300 MG/1
300 TABLET ORAL
Qty: 30 TAB | Refills: 3 | Status: SHIPPED | OUTPATIENT
Start: 2019-02-01 | End: 2020-11-11

## 2019-02-01 ASSESSMENT — PAIN SCALES - GENERAL: PAINLEVEL: 10=SEVERE PAIN

## 2019-02-02 NOTE — ASSESSMENT & PLAN NOTE
She reports a small firm lump in the skin along the bra line inferiorly on the right.  She has had a mammogram which has been normal since this lump has been present.  She was told it was a sebaceous cyst.  She would like to have it removed as it rubs on her bra and causes pain.  It has been present for several years and has not grown or changed.  She denies any other lumps or bumps in the breasts, skin changes, or nipple discharge.

## 2019-02-02 NOTE — ASSESSMENT & PLAN NOTE
Patient reports that for the past 3 months, she has been getting more frequent heartburn symptoms.  She describes it as a burning pain in her upper chest.  The first time it happened, she thought she was having a heart attack, but she drank a small glass of milk and things dramatically improved so she never sought medical attention.  States that the symptoms do not seem to correspond to any type of food that she eats.  Usually happen on an empty stomach.  She gets an acidic taste in her mouth after the episode.  They occur a few times per week.  She has not tried any over-the-counter heartburn medications.  She has a history of Marley's esophagus but reports that recent endoscopies have been negative.  She sees Dr. Centeno of GI.  She denies nausea, vomiting, melena, hematochezia.

## 2019-02-02 NOTE — PROGRESS NOTES
Subjective:   Yuki Riddle is a 53 y.o. female here today for heartburn, breast cyst    GERD (gastroesophageal reflux disease)  Patient reports that for the past 3 months, she has been getting more frequent heartburn symptoms.  She describes it as a burning pain in her upper chest.  The first time it happened, she thought she was having a heart attack, but she drank a small glass of milk and things dramatically improved so she never sought medical attention.  States that the symptoms do not seem to correspond to any type of food that she eats.  Usually happen on an empty stomach.  She gets an acidic taste in her mouth after the episode.  They occur a few times per week.  She has not tried any over-the-counter heartburn medications.  She has a history of Marley's esophagus but reports that recent endoscopies have been negative.  She sees Dr. Centeno of GI.  She denies nausea, vomiting, melena, hematochezia.    Cyst of skin of right breast  She reports a small firm lump in the skin along the bra line inferiorly on the right.  She has had a mammogram which has been normal since this lump has been present.  She was told it was a sebaceous cyst.  She would like to have it removed as it rubs on her bra and causes pain.  It has been present for several years and has not grown or changed.  She denies any other lumps or bumps in the breasts, skin changes, or nipple discharge.     She continues to follow monthly with her endocrinologist who monitors her diabetes, neuropathy, and provides her pain medication and benzodiazepine.    She reports vaginal bleeding when she has a bowel movement however she is going to see her gynecologist on Monday.    She is currently complaining of a migraine headache with significant pain to which she attributes her elevated blood pressure.    Current medicines (including changes today)  Current Outpatient Prescriptions   Medication Sig Dispense Refill   • ranitidine (ZANTAC) 300 MG  tablet Take 1 Tab by mouth 1 time daily as needed for Heartburn. 30 Tab 3   • diazepam (VALIUM) 10 MG tablet TK 1 T PO  EVERY 6 HOURS  5   • ONE TOUCH ULTRA TEST strip USE TO TEST BLOOD GLUCSE FIVE TIMES DAILY.  7   • HUMALOG KWIKPEN 100 UNIT/ML Solution Pen-injector injection INJECT SC UP TO 30 UNITS PER DAY PER SLIDING SCALE.  1   • PROMETHEGAN 25 MG Suppos INSERT 1 SUPPOSITORY RECTALLY PRN NAUSEA  2   • BELSOMRA 15 MG Tab TK 1 T PO QD  0   • valACYclovir (VALTREX) 500 MG Tab Take 500 mg by mouth every day.  3   • albuterol 108 (90 Base) MCG/ACT Aero Soln inhalation aerosol Inhale 2 puffs by mouth every 4 hours as needed for shortness of breath or wheezing  1 Inhaler 2   • cyclobenzaprine (FLEXERIL) 10 MG Tab Take 10 mg by mouth 3 times a day as needed for Muscle Spasms.     • gabapentin (NEURONTIN) 600 MG tablet Take 1,200 mg by mouth as needed.     • HYDROcodone/acetaminophen (NORCO)  MG Tab Take 2 Tabs by mouth 4 times a day.     • Insulin Glargine (TOUJEO SOLOSTAR) 300 UNIT/ML Solution Pen-injector Inject 28 Units as instructed every bedtime.     • HYDROcodone Bitartrate 50 MG Capsule Extended Release 12 hour Abuse-Deterrent Take 1 Tab by mouth every 12 hours. For Breakthrough pain     • calcitRIOL (ROCALTROL) 0.25 MCG Cap Take 0.25 mcg by mouth every 72 hours.     • cloNIDine (CATAPRES) 0.1 MG Tab Take 1 Tab by mouth at bedtime as needed (insomnia). (Patient not taking: Reported on 2/1/2019)       No current facility-administered medications for this visit.      She  has a past medical history of Arthritis; Cataract; COPD (chronic obstructive pulmonary disease) (HCC); Diabetes; Diabetic neuropathy (HCC); Diabetic retinopathy; Diabetic retinopathy (HCC); Fibromyalgia; Hypertension; Migraine; POTS (postural orthostatic tachycardia syndrome); Recurrent UTI; Rheumatoid arthritis (HCC); Rheumatoid arthritis (HCC); Sleep apnea; and TIA (transient ischemic attack).    ROS   As above in HPI     Objective:  "    Blood pressure 150/90, pulse 74, temperature 36.6 °C (97.9 °F), temperature source Temporal, resp. rate 16, height 1.651 m (5' 5\"), weight 66.2 kg (146 lb), last menstrual period 01/01/2002, SpO2 96 %, not currently breastfeeding. Body mass index is 24.3 kg/m².   Physical Exam:  Constitutional: Alert, no distress.  Skin: Warm, dry, good turgor, approximately 1 cm round firm nodule just beneath the skin surface along the bra line beneath the right breast consistent with a sebaceous cyst.  Eye: Equal, round and reactive, conjunctiva clear, lids normal.  Abdomen: Soft, non-tender, no masses, no hepatosplenomegaly.      Assessment and Plan:   The following treatment plan was discussed    1. Gastroesophageal reflux disease, esophagitis presence not specified  Symptoms are most consistent with GERD.  We will start her on ranitidine daily for the next 2 weeks and then as needed after that.  She was advised that if symptoms do not improve on this regimen, she should let me know by my chart.  At that point, would try PPI.  If still no improvement, will refer to GI as this could also represent an esophageal spasm or other etiology as it is not quite typical for GERD.  -Ranitidine 300 mg daily times 2 weeks then as needed  -Patient to follow-up by my chart if not effective    2. Cyst of skin of right breast  Is superficial, does not involve breast tissue, and I think could be easily excised in office by general surgery.  Referral placed today  - REFERRAL TO GENERAL SURGERY        Followup: Return if symptoms worsen or fail to improve.         "

## 2019-03-06 ENCOUNTER — APPOINTMENT (OUTPATIENT)
Dept: OTHER | Facility: MEDICAL CENTER | Age: 54
End: 2019-03-06
Payer: MEDICAID

## 2019-03-06 ENCOUNTER — APPOINTMENT (OUTPATIENT)
Dept: VASCULAR LAB | Facility: MEDICAL CENTER | Age: 54
End: 2019-03-06
Payer: MEDICAID

## 2019-03-14 ENCOUNTER — TELEPHONE (OUTPATIENT)
Dept: MEDICAL GROUP | Facility: MEDICAL CENTER | Age: 54
End: 2019-03-14

## 2019-03-14 NOTE — TELEPHONE ENCOUNTER
1. Name: Katiuska  Call Back Number: [phone]    Patient approves a detailed voicemail message: yes    2. Patient is requesting orders for leg ultrasound (both legs)    3. Clinical Reason for Request: per patient, another [facility] doctor had ordered the US over a year ago, and she never had it done.    4. Specialty & Provider Name/Lab/Imaging Location Preference: [facility]    5. Is appointment scheduled for requested order/referral: no    Patient was informed they may receive a return phone call from our office with any additional questions before processing this request.    Patient stopped by the office stated she forgot to request an ultra sound order to be placed for both legs at her last appointment. Patient stated a [place] doctor with [facility] had ordered one about a year ago and she did not have it done. Patient did not state the reason for wanting the ultrasound done, but she stated she has discussed this with Dr Gr. Patient  was notified Dr Gr is out of the office and I wasn't sure if another provider would order the ultrasound. I notified patient a message would be relayed to another provider to see if they are willing to place an order or if she would have to wait for Dr Gr.

## 2019-04-03 ENCOUNTER — OFFICE VISIT (OUTPATIENT)
Dept: MEDICAL GROUP | Facility: MEDICAL CENTER | Age: 54
End: 2019-04-03
Attending: INTERNAL MEDICINE
Payer: MEDICAID

## 2019-04-03 VITALS
TEMPERATURE: 99.1 F | BODY MASS INDEX: 25.49 KG/M2 | DIASTOLIC BLOOD PRESSURE: 90 MMHG | HEART RATE: 82 BPM | RESPIRATION RATE: 16 BRPM | HEIGHT: 65 IN | SYSTOLIC BLOOD PRESSURE: 150 MMHG | WEIGHT: 153 LBS | OXYGEN SATURATION: 97 %

## 2019-04-03 DIAGNOSIS — L28.0 NEURODERMATITIS: ICD-10-CM

## 2019-04-03 DIAGNOSIS — L98.9 SCALP LESION: ICD-10-CM

## 2019-04-03 PROCEDURE — 99214 OFFICE O/P EST MOD 30 MIN: CPT | Performed by: INTERNAL MEDICINE

## 2019-04-03 PROCEDURE — 99213 OFFICE O/P EST LOW 20 MIN: CPT | Performed by: INTERNAL MEDICINE

## 2019-04-03 RX ORDER — LIDOCAINE 50 MG/G
OINTMENT TOPICAL
Qty: 30 G | Refills: 0 | Status: SHIPPED | OUTPATIENT
Start: 2019-04-03 | End: 2022-08-18

## 2019-04-03 RX ORDER — CYANOCOBALAMIN 1000 UG/ML
INJECTION, SOLUTION INTRAMUSCULAR; SUBCUTANEOUS
Refills: 0 | COMMUNITY
Start: 2019-03-03 | End: 2022-08-18

## 2019-04-03 RX ORDER — CEPHALEXIN 500 MG/1
500 CAPSULE ORAL 4 TIMES DAILY
Qty: 28 CAP | Refills: 0 | Status: SHIPPED | OUTPATIENT
Start: 2019-04-03 | End: 2019-04-04

## 2019-04-03 RX ORDER — PEN NEEDLE, DIABETIC 31 GX5/16"
1 NEEDLE, DISPOSABLE MISCELLANEOUS PRN
Refills: 2 | COMMUNITY
Start: 2019-03-03

## 2019-04-03 RX ORDER — CLINDAMYCIN HYDROCHLORIDE 300 MG/1
300 CAPSULE ORAL 3 TIMES DAILY
Qty: 21 CAP | Refills: 0 | Status: SHIPPED | OUTPATIENT
Start: 2019-04-03 | End: 2019-04-10

## 2019-04-03 ASSESSMENT — PAIN SCALES - GENERAL: PAINLEVEL: 6=MODERATE PAIN

## 2019-04-03 NOTE — ASSESSMENT & PLAN NOTE
As discussed below, she has a history of neurodermatitis related to her diabetes although she is not sure if this is an accurate diagnosis.  She reports having the scalp lesions biopsied in the past by dermatologist at [facility] over 9 years ago.  She has never seen a dermatologist since then.  She is interested in following up as she is starting to have recurrent lesions that are very painful for her and she is not sure what the best way to treat them.  She reports some associated hair loss when she has a severe lesion.

## 2019-04-04 ENCOUNTER — APPOINTMENT (OUTPATIENT)
Dept: RADIOLOGY | Facility: MEDICAL CENTER | Age: 54
End: 2019-04-04
Payer: MEDICAID

## 2019-04-04 ENCOUNTER — HOSPITAL ENCOUNTER (EMERGENCY)
Facility: MEDICAL CENTER | Age: 54
End: 2019-04-04
Attending: EMERGENCY MEDICINE
Payer: MEDICAID

## 2019-04-04 ENCOUNTER — APPOINTMENT (OUTPATIENT)
Dept: RADIOLOGY | Facility: MEDICAL CENTER | Age: 54
End: 2019-04-04
Attending: EMERGENCY MEDICINE
Payer: MEDICAID

## 2019-04-04 VITALS
RESPIRATION RATE: 16 BRPM | BODY MASS INDEX: 25.06 KG/M2 | OXYGEN SATURATION: 96 % | DIASTOLIC BLOOD PRESSURE: 112 MMHG | SYSTOLIC BLOOD PRESSURE: 162 MMHG | WEIGHT: 150.57 LBS | HEART RATE: 66 BPM | TEMPERATURE: 97.3 F

## 2019-04-04 DIAGNOSIS — L02.91 ABSCESS: ICD-10-CM

## 2019-04-04 DIAGNOSIS — L28.0 NEURODERMATITIS: ICD-10-CM

## 2019-04-04 LAB
ALBUMIN SERPL BCP-MCNC: 3.8 G/DL (ref 3.2–4.9)
ALBUMIN/GLOB SERPL: 1.5 G/DL
ALP SERPL-CCNC: 100 U/L (ref 30–99)
ALT SERPL-CCNC: 17 U/L (ref 2–50)
ANION GAP SERPL CALC-SCNC: 7 MMOL/L (ref 0–11.9)
APPEARANCE UR: CLEAR
AST SERPL-CCNC: 14 U/L (ref 12–45)
BACTERIA #/AREA URNS HPF: NEGATIVE /HPF
BASOPHILS # BLD AUTO: 0.9 % (ref 0–1.8)
BASOPHILS # BLD: 0.06 K/UL (ref 0–0.12)
BILIRUB SERPL-MCNC: 0.3 MG/DL (ref 0.1–1.5)
BILIRUB UR QL STRIP.AUTO: NEGATIVE
BUN SERPL-MCNC: 16 MG/DL (ref 8–22)
CALCIUM SERPL-MCNC: 8.6 MG/DL (ref 8.5–10.5)
CHLORIDE SERPL-SCNC: 106 MMOL/L (ref 96–112)
CO2 SERPL-SCNC: 26 MMOL/L (ref 20–33)
COLOR UR: YELLOW
CREAT SERPL-MCNC: 0.87 MG/DL (ref 0.5–1.4)
EOSINOPHIL # BLD AUTO: 0.11 K/UL (ref 0–0.51)
EOSINOPHIL NFR BLD: 1.7 % (ref 0–6.9)
EPI CELLS #/AREA URNS HPF: ABNORMAL /HPF
ERYTHROCYTE [DISTWIDTH] IN BLOOD BY AUTOMATED COUNT: 43.8 FL (ref 35.9–50)
GLOBULIN SER CALC-MCNC: 2.5 G/DL (ref 1.9–3.5)
GLUCOSE BLD-MCNC: 295 MG/DL (ref 65–99)
GLUCOSE SERPL-MCNC: 238 MG/DL (ref 65–99)
GLUCOSE UR STRIP.AUTO-MCNC: 100 MG/DL
HCT VFR BLD AUTO: 35.3 % (ref 37–47)
HGB BLD-MCNC: 11.8 G/DL (ref 12–16)
HYALINE CASTS #/AREA URNS LPF: ABNORMAL /LPF
IMM GRANULOCYTES # BLD AUTO: 0.02 K/UL (ref 0–0.11)
IMM GRANULOCYTES NFR BLD AUTO: 0.3 % (ref 0–0.9)
KETONES UR STRIP.AUTO-MCNC: NEGATIVE MG/DL
LACTATE BLD-SCNC: 1.2 MMOL/L (ref 0.5–2)
LEUKOCYTE ESTERASE UR QL STRIP.AUTO: ABNORMAL
LYMPHOCYTES # BLD AUTO: 1.46 K/UL (ref 1–4.8)
LYMPHOCYTES NFR BLD: 22.5 % (ref 22–41)
MCH RBC QN AUTO: 32.3 PG (ref 27–33)
MCHC RBC AUTO-ENTMCNC: 33.4 G/DL (ref 33.6–35)
MCV RBC AUTO: 96.7 FL (ref 81.4–97.8)
MICRO URNS: ABNORMAL
MONOCYTES # BLD AUTO: 0.35 K/UL (ref 0–0.85)
MONOCYTES NFR BLD AUTO: 5.4 % (ref 0–13.4)
NEUTROPHILS # BLD AUTO: 4.5 K/UL (ref 2–7.15)
NEUTROPHILS NFR BLD: 69.2 % (ref 44–72)
NITRITE UR QL STRIP.AUTO: NEGATIVE
NRBC # BLD AUTO: 0 K/UL
NRBC BLD-RTO: 0 /100 WBC
PH UR STRIP.AUTO: 5.5 [PH]
PLATELET # BLD AUTO: 185 K/UL (ref 164–446)
PMV BLD AUTO: 12 FL (ref 9–12.9)
POTASSIUM SERPL-SCNC: 4.3 MMOL/L (ref 3.6–5.5)
PROT SERPL-MCNC: 6.3 G/DL (ref 6–8.2)
PROT UR QL STRIP: 100 MG/DL
RBC # BLD AUTO: 3.65 M/UL (ref 4.2–5.4)
RBC # URNS HPF: ABNORMAL /HPF
RBC UR QL AUTO: NEGATIVE
SODIUM SERPL-SCNC: 139 MMOL/L (ref 135–145)
SP GR UR STRIP.AUTO: 1.02
UROBILINOGEN UR STRIP.AUTO-MCNC: 0.2 MG/DL
WBC # BLD AUTO: 6.5 K/UL (ref 4.8–10.8)
WBC #/AREA URNS HPF: ABNORMAL /HPF

## 2019-04-04 PROCEDURE — 303977 HCHG I & D

## 2019-04-04 PROCEDURE — 87086 URINE CULTURE/COLONY COUNT: CPT

## 2019-04-04 PROCEDURE — 85025 COMPLETE CBC W/AUTO DIFF WBC: CPT

## 2019-04-04 PROCEDURE — 700102 HCHG RX REV CODE 250 W/ 637 OVERRIDE(OP): Performed by: EMERGENCY MEDICINE

## 2019-04-04 PROCEDURE — A9270 NON-COVERED ITEM OR SERVICE: HCPCS | Performed by: EMERGENCY MEDICINE

## 2019-04-04 PROCEDURE — 99285 EMERGENCY DEPT VISIT HI MDM: CPT

## 2019-04-04 PROCEDURE — 87040 BLOOD CULTURE FOR BACTERIA: CPT

## 2019-04-04 PROCEDURE — 82962 GLUCOSE BLOOD TEST: CPT

## 2019-04-04 PROCEDURE — 81001 URINALYSIS AUTO W/SCOPE: CPT

## 2019-04-04 PROCEDURE — 80053 COMPREHEN METABOLIC PANEL: CPT

## 2019-04-04 PROCEDURE — 700101 HCHG RX REV CODE 250: Performed by: EMERGENCY MEDICINE

## 2019-04-04 PROCEDURE — 83605 ASSAY OF LACTIC ACID: CPT

## 2019-04-04 PROCEDURE — 71045 X-RAY EXAM CHEST 1 VIEW: CPT

## 2019-04-04 RX ORDER — AMOXICILLIN AND CLAVULANATE POTASSIUM 875; 125 MG/1; MG/1
1 TABLET, FILM COATED ORAL ONCE
Status: DISCONTINUED | OUTPATIENT
Start: 2019-04-04 | End: 2019-04-04

## 2019-04-04 RX ORDER — CEPHALEXIN 500 MG/1
500 CAPSULE ORAL ONCE
Status: COMPLETED | OUTPATIENT
Start: 2019-04-04 | End: 2019-04-04

## 2019-04-04 RX ORDER — CEPHALEXIN 500 MG/1
500 CAPSULE ORAL 3 TIMES DAILY
Qty: 21 CAP | Refills: 0 | Status: SHIPPED | OUTPATIENT
Start: 2019-04-04 | End: 2019-04-11

## 2019-04-04 RX ORDER — HYDROCODONE BITARTRATE AND ACETAMINOPHEN 5; 325 MG/1; MG/1
1 TABLET ORAL ONCE
Status: COMPLETED | OUTPATIENT
Start: 2019-04-04 | End: 2019-04-04

## 2019-04-04 RX ORDER — LIDOCAINE HYDROCHLORIDE 10 MG/ML
20 INJECTION, SOLUTION INFILTRATION; PERINEURAL ONCE
Status: COMPLETED | OUTPATIENT
Start: 2019-04-04 | End: 2019-04-04

## 2019-04-04 RX ADMIN — HYDROCODONE BITARTRATE AND ACETAMINOPHEN 1 TABLET: 5; 325 TABLET ORAL at 20:12

## 2019-04-04 RX ADMIN — LIDOCAINE HYDROCHLORIDE 20 ML: 10 INJECTION, SOLUTION INFILTRATION; PERINEURAL at 18:15

## 2019-04-04 RX ADMIN — CEPHALEXIN 500 MG: 500 CAPSULE ORAL at 18:15

## 2019-04-04 NOTE — ASSESSMENT & PLAN NOTE
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  She reports intermittent appearance of scalp sores which she attributes to the neurodermatitis.  She is usually able to tolerate the pain but she reports recently she has developed a very large one over the occiput region which is become intolerable.  She reports headaches, and severe tenderness to palpation over the area.  She denies any purulent drainage from the area.  She reports subjective fevers and chills.  She is having some neck pain related to this lesion.  She denies neck stiffness, changes in vision, severe headache.  This current lesion has been present for about a week.  She states that usually they start out as raised masses and flatten out over time but this lesion has not flattened out yet.

## 2019-04-04 NOTE — ED TRIAGE NOTES
"Chief Complaint   Patient presents with   • Open Wound     back head [id number] ago   • Neck Pain   • Chills   • Headache   • Altered Mental Status     Pt ambulated to triage, pt here for open wound back of her head . She said she has \"diabetic neurodermatitis\", wound causing head and neck pain.   Sepsis score-=4, protocol ordered.   Educated pt on triage process.  Asked to return to triage RN for any new or worsening of symptoms.   "

## 2019-04-04 NOTE — PROGRESS NOTES
Subjective:   Yuki Riddle is a 53 y.o. female here today for painful scalp lesions    Scalp lesion  As discussed below, she has a history of neurodermatitis related to her diabetes although she is not sure if this is an accurate diagnosis.  She reports having the scalp lesions biopsied in the past by dermatologist at [facility] over 9 years ago.  She has never seen a dermatologist since then.  She is interested in following up as she is starting to have recurrent lesions that are very painful for her and she is not sure what the best way to treat them.  She reports some associated hair loss when she has a severe lesion.    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  She reports intermittent appearance of scalp sores which she attributes to the neurodermatitis.  She is usually able to tolerate the pain but she reports recently she has developed a very large one over the occiput region which is become intolerable.  She reports headaches, and severe tenderness to palpation over the area.  She denies any purulent drainage from the area.  She reports subjective fevers and chills.  She is having some neck pain related to this lesion.  She denies neck stiffness, changes in vision, severe headache.  This current lesion has been present for about a week.  She states that usually they start out as raised masses and flatten out over time but this lesion has not flattened out yet.       Current medicines (including changes today)  Current Outpatient Prescriptions   Medication Sig Dispense Refill   • clindamycin (CLEOCIN) 300 MG Cap Take 1 Cap by mouth 3 times a day for 7 days. 21 Cap 0   • cephALEXin (KEFLEX) 500 MG Cap Take 1 Cap by mouth 4 times a day for 7 days. 28 Cap 0   • lidocaine (XYLOCAINE) 5 % Ointment Apply small amount to painful lesion on scalp up to twice daily as needed 30 g 0   • diazepam (VALIUM) 10 MG tablet TK 1 T  PO  EVERY 6 HOURS  5   • ONE TOUCH ULTRA TEST strip USE TO TEST BLOOD GLUCSE FIVE TIMES DAILY.  7   • HUMALOG KWIKPEN 100 UNIT/ML Solution Pen-injector injection INJECT SC UP TO 30 UNITS PER DAY PER SLIDING SCALE.  1   • PROMETHEGAN 25 MG Suppos INSERT 1 SUPPOSITORY RECTALLY PRN NAUSEA  2   • BELSOMRA 15 MG Tab TK 1 T PO QD  0   • valACYclovir (VALTREX) 500 MG Tab Take 500 mg by mouth every day.  3   • albuterol 108 (90 Base) MCG/ACT Aero Soln inhalation aerosol Inhale 2 puffs by mouth every 4 hours as needed for shortness of breath or wheezing  1 Inhaler 2   • calcitRIOL (ROCALTROL) 0.25 MCG Cap Take 0.25 mcg by mouth every 72 hours.     • cyclobenzaprine (FLEXERIL) 10 MG Tab Take 10 mg by mouth 3 times a day as needed for Muscle Spasms.     • gabapentin (NEURONTIN) 600 MG tablet Take 1,200 mg by mouth as needed.     • HYDROcodone/acetaminophen (NORCO)  MG Tab Take 2 Tabs by mouth 4 times a day.     • Insulin Glargine (TOUJEO SOLOSTAR) 300 UNIT/ML Solution Pen-injector Inject 28 Units as instructed every bedtime.     • cloNIDine (CATAPRES) 0.1 MG Tab Take 1 Tab by mouth at bedtime as needed (insomnia).     • HYDROcodone Bitartrate 50 MG Capsule Extended Release 12 hour Abuse-Deterrent Take 1 Tab by mouth every 12 hours. For Breakthrough pain     • ranitidine (ZANTAC) 300 MG tablet Take 1 Tab by mouth 1 time daily as needed for Heartburn. (Patient not taking: Reported on 4/3/2019) 30 Tab 3     No current facility-administered medications for this visit.      She  has a past medical history of Arthritis; Cataract; COPD (chronic obstructive pulmonary disease) (HCC); Diabetes; Diabetic neuropathy (HCC); Diabetic retinopathy; Diabetic retinopathy (HCC); Fibromyalgia; Hypertension; Migraine; POTS (postural orthostatic tachycardia syndrome); Recurrent UTI; Rheumatoid arthritis (HCC); Rheumatoid arthritis (HCC); Sleep apnea; and TIA (transient ischemic attack).    ROS   Denies chest pain, shortness of breath  As  above in HPI     Objective:     Vitals:    04/03/19 1626   BP: 150/90   Pulse: 82   Resp: 16   Temp: 37.3 °C (99.1 °F)   SpO2: 97%        Body mass index is 25.46 kg/m².   Physical Exam:  Constitutional: Alert, no distress, appears uncomfortable secondary to pain  Skin: Warm, dry, good turgor, right temporal region has 2 scabbed areas that are approximately 3 mm in diameter with no surrounding erythema.  There is an approximately 1 cm round raised nodule over the occiput with a scab on it and surrounding induration and redness that is extremely tender to palpation.  There is no underlying fluctuance.  Eye: Equal, round and reactive, conjunctiva clear, lids normal.  Neck: No neck stiffness or nuchal rigidity  Psych: Alert and oriented x3, normal affect and mood.      Assessment and Plan:   The following treatment plan was discussed    1. Scalp lesion  Whether this represents diabetic neurodermatitis or not, it appears infected.  We discussed a course of antibiotics to treat as below.  We will try some topical lidocaine as well for some pain relief.  She desires ultimately to have a definitive diagnosis and to see dermatology for this.  We discussed that the referral will be sent to [place] and she is agreeable to going.  We discussed ER precautions including worsening headache, change in mentation, very stiff neck, high fevers that would prompt her to go for further evaluation.  - REFERRAL TO DERMATOLOGY  - clindamycin (CLEOCIN) 300 MG Cap; Take 1 Cap by mouth 3 times a day for 7 days.  Dispense: 21 Cap; Refill: 0  - cephALEXin (KEFLEX) 500 MG Cap; Take 1 Cap by mouth 4 times a day for 7 days.  Dispense: 28 Cap; Refill: 0  - lidocaine (XYLOCAINE) 5 % Ointment; Apply small amount to painful lesion on scalp up to twice daily as needed  Dispense: 30 g; Refill: 0    2. Neurodermatitis  - REFERRAL TO DERMATOLOGY        Followup: Return if symptoms worsen or fail to improve.

## 2019-04-05 NOTE — ED PROVIDER NOTES
ED Provider Note    Chief Complaint:   Scalp lesions    HPI:  Yuki Riddle is a 53 y.o. female who presents with chief complaint of scalp wound.  She has a past medical history of diabetes and reports a history of diabetic neurodermatitis that causes chronic excoriated lesions over the scalp.  She has 2-3 of these lesions, she states they are identical to her chronic skin lesions that have been present for several years.  She does have one abnormal lesion to the occipital portion of the scalp that is now swollen and reddened.  Additionally, this lesion is more painful than the others.  Pain is exacerbated by touching the area, she does have some radiation across the scalp.  She denies any localized neck pain or back pain, no fevers, no altered mental status.  States that she does currently have a referral to a dermatologist for further evaluation of these lesions.  She does have a few excoriated lesions on her arms as well, states that these are unchanged from baseline.    She states her temperature has been higher than normal, no higher than 100.1 recently.  She has been able to maintain relatively good glycemic control with no significant hyperglycemia over the past few days.  She has had no chest pain, shortness of breath, no abdominal pain.  She has no nausea, no vomiting.  No leg pain or leg swelling.    Review of Systems:  See HPI for pertinent positives and negatives. All other systems negative.    Past Medical History:   has a past medical history of Arthritis; Cataract; COPD (chronic obstructive pulmonary disease) (HCC); Diabetes; Diabetic neuropathy (HCC); Diabetic retinopathy; Diabetic retinopathy (HCC); Fibromyalgia; Hypertension; Migraine; POTS (postural orthostatic tachycardia syndrome); Recurrent UTI; Rheumatoid arthritis (HCC); Rheumatoid arthritis (HCC); Sleep apnea; and TIA (transient ischemic attack).    Social History:  Social History     Social History Main Topics   • Smoking status:  Current Every Day Smoker     Packs/day: 0.50     Years: 29.00     Types: Cigarettes   • Smokeless tobacco: Never Used      Comment: 15-18 cigarettes a day    • Alcohol use No   • Drug use: No   • Sexual activity: Not on file       Surgical History:   has a past surgical history that includes tonsillectomy; cholecystectomy; primary c section; other; foreign body removal (6/18/2013); gastroscopy with balloon dilatation (2/13/2015); and gastroscopy with biopsy (2/13/2015).    Current Medications:  Home Medications     Reviewed by Savannah Segura R.N. (Registered Nurse) on 04/04/19 at 1505  Med List Status: Unable to Obtain   Medication Last Dose Status   albuterol 108 (90 Base) MCG/ACT Aero Soln inhalation aerosol  Active   B-D ULTRAFINE III SHORT PEN  Active   BELSOMRA 15 MG Tab  Active   calcitRIOL (ROCALTROL) 0.25 MCG Cap  Active   cephALEXin (KEFLEX) 500 MG Cap  Active   clindamycin (CLEOCIN) 300 MG Cap  Active   cloNIDine (CATAPRES) 0.1 MG Tab  Active   cyanocobalamin (VITAMIN B-12) 1000 MCG/ML Solution  Active   cyclobenzaprine (FLEXERIL) 10 MG Tab  Active   diazepam (VALIUM) 10 MG tablet  Active   gabapentin (NEURONTIN) 600 MG tablet  Active   HUMALOG KWIKPEN 100 UNIT/ML Solution Pen-injector injection  Active   HYDROcodone Bitartrate 50 MG Capsule Extended Release 12 hour Abuse-Deterrent  Active   HYDROcodone/acetaminophen (NORCO)  MG Tab  Active   Insulin Glargine (TOUJEO SOLOSTAR) 300 UNIT/ML Solution Pen-injector  Active   lidocaine (XYLOCAINE) 5 % Ointment  Active   ONE TOUCH ULTRA TEST strip  Active   PROMETHEGAN 25 MG Suppos  Active   ranitidine (ZANTAC) 300 MG tablet  Active   valACYclovir (VALTREX) 500 MG Tab  Active                Allergies:  Allergies   Allergen Reactions   • Compazine      Aggressive behavior    • Sulfa Drugs Itching     Blotchy spots on chest       Physical Exam:  Vital Signs: BP (!) 162/112   Pulse 66   Temp 36.3 °C (97.3 °F) (Temporal)   Resp 16   Wt 68.3 kg (150 lb 9.2  oz)   LMP 01/01/2002   SpO2 96%   BMI 25.06 kg/m²   Constitutional: Alert, no acute distress  HENT: Moist mucus membranes, normal posterior pharynx, no intraoral lesions.  Multiple excoriated scalp lesions present as documented in skin exam.  Eyes: Pupils equal and reactive, normal conjunctiva  Neck: Supple, normal range of motion, no stridor  Cardiovascular: Extremities are warm and well perfused, no murmur appreciated, normal cardiac auscultation, normal rate  Pulmonary: No respiratory distress, normal work of breathing, no accessory muscule usage, breath sounds clear and equal bilaterally  Abdomen: Soft, non-distended, non-tender to palpation, no peritoneal signs  Skin: Multiple excoriated lesions of the scalp.  Occipital scalp with a 2 cm in diameter indurated and fluctuant lesion without active drainage.  Musculoskeletal: Normal range of motion in all extremities, no swelling or deformity noted  Neurologic: Alert, oriented, normal speech, normal motor function  Psychiatric: Normal and appropriate mood and affect    Medical records reviewed for continuity of care.  Primary care clinic note reviewed from 4/3/19.  Patient reports diagnosis of diabetic neurodermatitis.  Notes she recently had worsening sores that become intolerable.  Additionally reports a lesion on the neck.  Noted to have multiple excoriated areas on the scalp.  Discharged with a prescription for Keflex, clindamycin, and referred to dermatology.  Prescribed lidocaine ointment for pain.    Labs:  Labs Reviewed   CBC WITH DIFFERENTIAL - Abnormal; Notable for the following:        Result Value    RBC 3.65 (*)     Hemoglobin 11.8 (*)     Hematocrit 35.3 (*)     MCHC 33.4 (*)     All other components within normal limits   COMP METABOLIC PANEL - Abnormal; Notable for the following:     Glucose 238 (*)     Alkaline Phosphatase 100 (*)     All other components within normal limits   URINALYSIS - Abnormal; Notable for the following:     Glucose 100  "(*)     Protein 100 (*)     Leukocyte Esterase Small (*)     All other components within normal limits    Narrative:     Indication for culture:->Emergency Room Patient   URINE MICROSCOPIC (W/UA) - Abnormal; Notable for the following:     WBC 5-10 (*)     All other components within normal limits    Narrative:     Indication for culture:->Emergency Room Patient   ACCU-CHEK GLUCOSE - Abnormal; Notable for the following:     Glucose - Accu-Ck 295 (*)     All other components within normal limits   LACTIC ACID   URINE CULTURE(NEW)    Narrative:     Indication for culture:->Emergency Room Patient   BLOOD CULTURE    Narrative:     Per Hospital Policy: Only change Specimen Src: to \"Line\" if  specified by physician order.   BLOOD CULTURE    Narrative:     Per Hospital Policy: Only change Specimen Src: to \"Line\" if  specified by physician order.   ESTIMATED GFR   LACTIC ACID       Radiology:  DX-CHEST-PORTABLE (1 VIEW)   Final Result         1. No acute cardiopulmonary abnormalities are identified.             ED Medications Administered:  Medications   lidocaine (XYLOCAINE) 1%  injection (20 mL Other Given by Provider 4/4/19 1815)   cephALEXin (KEFLEX) capsule 500 mg (500 mg Oral Given 4/4/19 1815)   HYDROcodone-acetaminophen (NORCO) 5-325 MG per tablet 1 Tab (1 Tab Oral Given 4/4/19 2012)       Differential diagnosis:  Chronic skin lesions, abscess, hyperglycemia, impaired immunity    MDM:  History and physical exam as documented above.  Patient presents with chronic scalp lesions that have been present for several years, previously diagnosed with neurodermatitis.  Over the past 24-48 hours 1 of the lesions has developed what appears to be an abscess.  On arrival to the emergency department, there was concern at triage for sepsis causing sepsis protocol to be ordered from triage.  On my assessment she is afebrile, she has no hypotension, she has no tachycardia, no hypoxia, no tachypnea.  She has a normal white blood " count.  No evidence of Sirs or sepsis.    On laboratory evaluation urinalysis ordered at triage is positive for white blood cells.  She has no urinary symptoms, no bacteria.  Urine culture ordered at triage is pending.  At this time I do not believe she requires treatment for urinary tract infection.  CMP is reassuring, blood glucose is 238 with a normal anion gap.  Lactic acid is normal at 1.2.  She is a normal white blood count.  Hemoglobin is 11.8 which is improved from prior.    Physical exam reveals a likely secondary infection of 1 of her chronic lesions.  Incision and drainage performed according to below procedure note.  She tolerated the procedure well, plan at this time is for discharge home with dermatology follow-up as previously recommended by her primary care physician.  She already has prescriptions for Keflex and clindamycin that were given to her yesterday.  She has not filled those medications because she is concerned about her insurance.  Her insurance status was verified, she should be able to easily fill these prescriptions.  First dose was given in the emergency department.    She is discharged home in stable condition.  Return precautions discussed including worsening pain, redness, swelling, temperature over 100.4, or any further concerns.  She will follow-up with her primary care physician in 2-3 days for wound recheck.    Incision and Drainage Procedure Note    The area was prepped and draped, cleansed with Betadine.  Anesthetized with 1% lidocaine.  Linear incision was made over the area of greatest fluctuance, moderate purulent material was expressed.  Abscess was explored thoroughly.  Bleeding was minimal.  The patient tolerated the procedure well without complications.    Blood pressure today is greater than 120/80, patient is instructed to follow up with primary care provider for blood pressure recheck.    Disposition:  Discharged home in stable condition    Final Impression:  1.  Abscess    2. Neurodermatitis          Electronically signed by: Dai Guardado, 4/4/2019 8:50 PM

## 2019-04-05 NOTE — ED NOTES
"Steady gait to room. Pt C/O pain/wound to posterior neck, reports it began \"small like a pimple.\" 10 days PTA, increased in size 4 days PTA, has increased in size and pt reports it has been associated with dizziness, fatigue, pain and pt reports she has trouble staying awake.  "

## 2019-04-05 NOTE — DISCHARGE INSTRUCTIONS
Please keep the area clean and dry.  Soak the wound twice daily to encourage drainage.  You may shampoo your hair and wash her scalp as usual.  Take your antibiotics as prescribed.  Please return to the emergency department if you develop new or worsening symptoms including worsening pain, swelling, drainage, or any further concerns.

## 2019-04-05 NOTE — ED NOTES
Yuki Riddle discharged via ambulatory with family.  Discharge instructions given and reviewed, patient educated to follow up with PCP, verbalized understanding.  Prescriptions given x 1.  All personal belongings in possession.  No questions at this time.

## 2019-04-06 LAB
BACTERIA UR CULT: NORMAL
SIGNIFICANT IND 70042: NORMAL
SITE SITE: NORMAL
SOURCE SOURCE: NORMAL

## 2019-04-16 ENCOUNTER — TELEPHONE (OUTPATIENT)
Dept: MEDICAL GROUP | Facility: MEDICAL CENTER | Age: 54
End: 2019-04-16

## 2019-04-16 NOTE — TELEPHONE ENCOUNTER
VOICEMAIL  1. Caller Name: Pt                      Call Back Number: [phone] (home)       2. Message: Pt left vm, states she was referred to Derm. Has an appt on 5/15/19 in the afternoon, MTM is requesting an order form to be completed for the pt to have transportation to their office on this date.  Thank you, please advise.     3. Patient approves office to leave a detailed voicemail/MyChart message: yes

## 2019-05-17 ENCOUNTER — TELEPHONE (OUTPATIENT)
Dept: MEDICAL GROUP | Facility: MEDICAL CENTER | Age: 54
End: 2019-05-17

## 2019-05-17 RX ORDER — HYDROXYZINE HYDROCHLORIDE 25 MG/1
25 TABLET, FILM COATED ORAL 3 TIMES DAILY PRN
Qty: 30 TAB | Refills: 0 | Status: ON HOLD | OUTPATIENT
Start: 2019-05-17 | End: 2023-10-01
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
## 2019-05-18 NOTE — TELEPHONE ENCOUNTER
I will send over prescription for hydroxyzine.  She can take this up to 3 times a day as needed.  Please let her know.    Renate Gr M.D.

## 2019-05-23 ENCOUNTER — APPOINTMENT (OUTPATIENT)
Dept: RADIOLOGY | Facility: MEDICAL CENTER | Age: 54
End: 2019-05-23
Attending: EMERGENCY MEDICINE
Payer: MEDICAID

## 2019-05-23 ENCOUNTER — HOSPITAL ENCOUNTER (EMERGENCY)
Facility: MEDICAL CENTER | Age: 54
End: 2019-05-23
Attending: EMERGENCY MEDICINE
Payer: MEDICAID

## 2019-05-23 VITALS
RESPIRATION RATE: 20 BRPM | SYSTOLIC BLOOD PRESSURE: 151 MMHG | BODY MASS INDEX: 25.38 KG/M2 | TEMPERATURE: 97.9 F | WEIGHT: 152.34 LBS | HEIGHT: 65 IN | DIASTOLIC BLOOD PRESSURE: 78 MMHG | OXYGEN SATURATION: 98 % | HEART RATE: 75 BPM

## 2019-05-23 DIAGNOSIS — D69.6 THROMBOCYTOPENIA (HCC): ICD-10-CM

## 2019-05-23 DIAGNOSIS — R73.9 HYPERGLYCEMIA: ICD-10-CM

## 2019-05-23 DIAGNOSIS — R06.02 SHORTNESS OF BREATH: ICD-10-CM

## 2019-05-23 DIAGNOSIS — J45.901 REACTIVE AIRWAY DISEASE WITH ACUTE EXACERBATION, UNSPECIFIED ASTHMA SEVERITY, UNSPECIFIED WHETHER PERSISTENT: ICD-10-CM

## 2019-05-23 DIAGNOSIS — D64.9 ANEMIA, UNSPECIFIED TYPE: ICD-10-CM

## 2019-05-23 LAB
ALBUMIN SERPL BCP-MCNC: 3.3 G/DL (ref 3.2–4.9)
ALBUMIN/GLOB SERPL: 1.4 G/DL
ALP SERPL-CCNC: 116 U/L (ref 30–99)
ALT SERPL-CCNC: 27 U/L (ref 2–50)
ANION GAP SERPL CALC-SCNC: 9 MMOL/L (ref 0–11.9)
AST SERPL-CCNC: 25 U/L (ref 12–45)
BASOPHILS # BLD AUTO: 0.9 % (ref 0–1.8)
BASOPHILS # BLD: 0.03 K/UL (ref 0–0.12)
BILIRUB SERPL-MCNC: 0.8 MG/DL (ref 0.1–1.5)
BNP SERPL-MCNC: 83 PG/ML (ref 0–100)
BUN SERPL-MCNC: 6 MG/DL (ref 8–22)
CALCIUM SERPL-MCNC: 8.4 MG/DL (ref 8.4–10.2)
CHLORIDE SERPL-SCNC: 92 MMOL/L (ref 96–112)
CO2 SERPL-SCNC: 27 MMOL/L (ref 20–33)
CREAT SERPL-MCNC: 0.83 MG/DL (ref 0.5–1.4)
EKG IMPRESSION: NORMAL
EOSINOPHIL # BLD AUTO: 0.11 K/UL (ref 0–0.51)
EOSINOPHIL NFR BLD: 3.2 % (ref 0–6.9)
ERYTHROCYTE [DISTWIDTH] IN BLOOD BY AUTOMATED COUNT: 43.6 FL (ref 35.9–50)
FLUAV RNA SPEC QL NAA+PROBE: NEGATIVE
FLUBV RNA SPEC QL NAA+PROBE: NEGATIVE
GLOBULIN SER CALC-MCNC: 2.4 G/DL (ref 1.9–3.5)
GLUCOSE BLD-MCNC: 386 MG/DL (ref 65–99)
GLUCOSE SERPL-MCNC: 340 MG/DL (ref 65–99)
HCT VFR BLD AUTO: 33.7 % (ref 37–47)
HGB BLD-MCNC: 11.8 G/DL (ref 12–16)
IMM GRANULOCYTES # BLD AUTO: 0 K/UL (ref 0–0.11)
IMM GRANULOCYTES NFR BLD AUTO: 0 % (ref 0–0.9)
LYMPHOCYTES # BLD AUTO: 0.67 K/UL (ref 1–4.8)
LYMPHOCYTES NFR BLD: 19.6 % (ref 22–41)
MCH RBC QN AUTO: 32.8 PG (ref 27–33)
MCHC RBC AUTO-ENTMCNC: 35 G/DL (ref 33.6–35)
MCV RBC AUTO: 93.6 FL (ref 81.4–97.8)
MONOCYTES # BLD AUTO: 0.45 K/UL (ref 0–0.85)
MONOCYTES NFR BLD AUTO: 13.2 % (ref 0–13.4)
NEUTROPHILS # BLD AUTO: 2.15 K/UL (ref 2–7.15)
NEUTROPHILS NFR BLD: 63.1 % (ref 44–72)
NRBC # BLD AUTO: 0 K/UL
NRBC BLD-RTO: 0 /100 WBC
PLATELET # BLD AUTO: 117 K/UL (ref 164–446)
PMV BLD AUTO: 11.5 FL (ref 9–12.9)
POTASSIUM SERPL-SCNC: 3.9 MMOL/L (ref 3.6–5.5)
PROT SERPL-MCNC: 5.7 G/DL (ref 6–8.2)
RBC # BLD AUTO: 3.6 M/UL (ref 4.2–5.4)
SODIUM SERPL-SCNC: 128 MMOL/L (ref 135–145)
WBC # BLD AUTO: 3.4 K/UL (ref 4.8–10.8)

## 2019-05-23 PROCEDURE — 82962 GLUCOSE BLOOD TEST: CPT

## 2019-05-23 PROCEDURE — 80053 COMPREHEN METABOLIC PANEL: CPT

## 2019-05-23 PROCEDURE — 94760 N-INVAS EAR/PLS OXIMETRY 1: CPT

## 2019-05-23 PROCEDURE — 83880 ASSAY OF NATRIURETIC PEPTIDE: CPT

## 2019-05-23 PROCEDURE — 700111 HCHG RX REV CODE 636 W/ 250 OVERRIDE (IP)

## 2019-05-23 PROCEDURE — 700101 HCHG RX REV CODE 250: Performed by: EMERGENCY MEDICINE

## 2019-05-23 PROCEDURE — 93005 ELECTROCARDIOGRAM TRACING: CPT

## 2019-05-23 PROCEDURE — 71045 X-RAY EXAM CHEST 1 VIEW: CPT

## 2019-05-23 PROCEDURE — 99285 EMERGENCY DEPT VISIT HI MDM: CPT

## 2019-05-23 PROCEDURE — 93005 ELECTROCARDIOGRAM TRACING: CPT | Performed by: EMERGENCY MEDICINE

## 2019-05-23 PROCEDURE — 700105 HCHG RX REV CODE 258: Performed by: EMERGENCY MEDICINE

## 2019-05-23 PROCEDURE — 85025 COMPLETE CBC W/AUTO DIFF WBC: CPT

## 2019-05-23 PROCEDURE — 94640 AIRWAY INHALATION TREATMENT: CPT

## 2019-05-23 PROCEDURE — A9270 NON-COVERED ITEM OR SERVICE: HCPCS | Performed by: EMERGENCY MEDICINE

## 2019-05-23 PROCEDURE — 700102 HCHG RX REV CODE 250 W/ 637 OVERRIDE(OP): Performed by: EMERGENCY MEDICINE

## 2019-05-23 PROCEDURE — 36415 COLL VENOUS BLD VENIPUNCTURE: CPT

## 2019-05-23 PROCEDURE — 87502 INFLUENZA DNA AMP PROBE: CPT

## 2019-05-23 PROCEDURE — 96374 THER/PROPH/DIAG INJ IV PUSH: CPT

## 2019-05-23 RX ORDER — ALBUTEROL SULFATE 90 UG/1
2 AEROSOL, METERED RESPIRATORY (INHALATION) EVERY 6 HOURS PRN
Qty: 8.5 G | Refills: 1 | Status: SHIPPED | OUTPATIENT
Start: 2019-05-23 | End: 2022-08-18

## 2019-05-23 RX ORDER — SODIUM CHLORIDE 9 MG/ML
1000 INJECTION, SOLUTION INTRAVENOUS ONCE
Status: COMPLETED | OUTPATIENT
Start: 2019-05-23 | End: 2019-05-23

## 2019-05-23 RX ORDER — BENZONATATE 100 MG/1
100 CAPSULE ORAL ONCE
Status: COMPLETED | OUTPATIENT
Start: 2019-05-23 | End: 2019-05-23

## 2019-05-23 RX ORDER — PREDNISONE 20 MG/1
40 TABLET ORAL DAILY
Qty: 10 TAB | Refills: 0 | Status: SHIPPED | OUTPATIENT
Start: 2019-05-23 | End: 2019-05-28

## 2019-05-23 RX ORDER — BENZONATATE 100 MG/1
100 CAPSULE ORAL 3 TIMES DAILY PRN
Qty: 60 CAP | Refills: 0 | Status: SHIPPED | OUTPATIENT
Start: 2019-05-23 | End: 2020-11-11

## 2019-05-23 RX ORDER — PREDNISONE 20 MG/1
60 TABLET ORAL DAILY
Status: DISCONTINUED | OUTPATIENT
Start: 2019-05-24 | End: 2019-05-23 | Stop reason: HOSPADM

## 2019-05-23 RX ORDER — PREDNISONE 20 MG/1
TABLET ORAL
Status: COMPLETED
Start: 2019-05-23 | End: 2019-05-23

## 2019-05-23 RX ADMIN — PREDNISONE 60 MG: 20 TABLET ORAL at 19:16

## 2019-05-23 RX ADMIN — BENZONATATE 100 MG: 100 CAPSULE ORAL at 19:16

## 2019-05-23 RX ADMIN — SODIUM CHLORIDE 1000 ML: 9 INJECTION, SOLUTION INTRAVENOUS at 20:28

## 2019-05-23 RX ADMIN — INSULIN HUMAN 5 UNITS: 100 INJECTION, SOLUTION PARENTERAL at 20:34

## 2019-05-23 RX ADMIN — ALBUTEROL SULFATE 2.5 MG: 2.5 SOLUTION RESPIRATORY (INHALATION) at 19:06

## 2019-05-24 NOTE — DISCHARGE INSTRUCTIONS
Use albuterol inhaler every 4-6 hours for cough and shortness of breath    Take Tessalon Perles as needed for cough    Follow-up with your primary care provider tomorrow    Return to the ER for worsening symptoms, difficulty breathing, chest pain, or other concerns

## 2019-05-24 NOTE — ED NOTES
Pt ambulated to the rest room with the SpO2 monitor 95-90 % on RM, HR- 100. Dr. Dejesus was notified.

## 2019-05-24 NOTE — ED NOTES
D/c inst reviewed w/the pt.  The pt denies questions.  The pt was inst on the x 3 rx and denies questions. The pt stated that she felt so much better.  The pt amb out of the ed w/o dff.

## 2019-05-24 NOTE — FLOWSHEET NOTE
05/23/19 1906   Interdisciplinary Plan of Care-Goals (Indications)   Bronchodilator Indications History / Diagnosis   Interdisciplinary Plan of Care-Outcomes    Bronchodilator Outcome Diminished Wheezing and Volume of Air Movement Increased   Education   Education Yes - Pt. / Family has been Instructed in use of Respiratory Medications and Adverse Reactions   RT Assessment of Delivered Medications   Evaluation of Medication Delivery Daily Yes-- Pt /Family has been Instructed in use of Respiratory Medications and Adverse Reactions   SVN Group   #SVN Performed Yes   Given By: Mouthpiece   Date SVN Last Changed 05/23/19   Date SVN Next Change Due (Q 7 Days) 05/30/19   Respiratory WDL   Respiratory (WDL) X   Chest Exam   Respiration 15   Pulse 76   Heart Rate (Monitored) 72   Breath Sounds   Pre/Post Intervention Pre Intervention Assessment   RUL Breath Sounds Diminished   RML Breath Sounds Diminished   RLL Breath Sounds Diminished   PARVEEN Breath Sounds Diminished   LLL Breath Sounds Diminished   Secretions   Cough Dry   Oximetry   #Pulse Oximetry (Single Determination) Yes   Oxygen   Pulse Oximetry 93 %   O2 (LPM) 0   O2 Daily Delivery Respiratory  Room Air with O2 Available

## 2019-05-24 NOTE — ED PROVIDER NOTES
ED Provider Note    CHIEF COMPLAINT  Chief Complaint   Patient presents with   • Shortness of Breath     since Monday   • Cough   • Chest Pain   • Fever     T-max 102.7 2 d ago       HPI  Yuki Riddle is a 53 y.o. female smoker with a history of pneumonia who presents complaining of cough and shortness of breath.    Patient states she has had tightness in her chest and dyspnea on exertion with shortness of breath at rest even accompanied by a dry cough since Monday.  She has measured temperatures of 103.1 since then and found herself to be hypoxic on the pulse oximeter she has at home with O2 sats between 87 and 90%.  Patient reports a dry cough.  She denies sputum, hemoptysis, sick contacts, nausea, vomiting, diarrhea, calf pain, leg swelling.      ALLERGIES  Allergies   Allergen Reactions   • Compazine      Aggressive behavior    • Sulfa Drugs Itching     Blotchy spots on chest       CURRENT MEDICATIONS  Home Medications    **Home medications have not yet been reviewed for this encounter**         PAST MEDICAL HISTORY   has a past medical history of Arthritis; Cataract; COPD (chronic obstructive pulmonary disease) (HCC); Diabetes; Diabetic neuropathy (HCC); Diabetic retinopathy; Diabetic retinopathy (HCC); Fibromyalgia; Hypertension; Migraine; POTS (postural orthostatic tachycardia syndrome); Recurrent UTI; Rheumatoid arthritis (HCC); Rheumatoid arthritis (HCC); Sleep apnea; and TIA (transient ischemic attack).    SURGICAL HISTORY   has a past surgical history that includes tonsillectomy; cholecystectomy; primary c section; other; foreign body removal (6/18/2013); gastroscopy with balloon dilatation (2/13/2015); and gastroscopy with biopsy (2/13/2015).    SOCIAL HISTORY  Social History     Social History Main Topics   • Smoking status: Current Every Day Smoker     Packs/day: 0.50     Years: 29.00     Types: Cigarettes   • Smokeless tobacco: Never Used      Comment: 15-18 cigarettes a day    • Alcohol  "use No   • Drug use: No   • Sexual activity: Not on file         Family Hx:  Denies    REVIEW OF SYSTEMS  See HPI for further details.  All other systems are negative except as above in HPI.    PHYSICAL EXAM  VITAL SIGNS: BP (!) 170/87   Pulse 85   Temp 36.7 °C (98 °F) (Oral)   Resp (!) 28   Ht 1.651 m (5' 5\")   Wt 69.1 kg (152 lb 5.4 oz)   LMP 01/01/2002   SpO2 95%   BMI 25.35 kg/m²     General:  WDWN, nontoxic appearing in NAD; A+Ox3; V/S as above; hypertensive; persistent dry cough  Skin: warm and dry; good color; no rash  HEENT: NCAT; EOMs intact; PERRL; no scleral icterus   Neck: FROM; no meningismus, no LAD  Cardiovascular: Regular heart rate and rhythm.  No murmurs, rubs, or gallops; pulses 2+ bilaterally radially and DP areas  Lungs: Clear to auscultation with good air movement bilaterally.  No wheezes, rhonchi, or rales.   Abdomen: BS present; soft; NTND; no rebound, guarding, or rigidity.  No organomegaly or pulsatile mass  Extremities: DANIEL x 4; no e/o trauma; no pedal edema; neg Ines's  Neurologic: CNs III-XII grossly intact; speech clear; distal sensation intact; strength 5/5 UE/LEs  Psychiatric: Appropriate affect, normal mood    LABS  Results for orders placed or performed during the hospital encounter of 05/23/19   CBC WITH DIFFERENTIAL   Result Value Ref Range    WBC 3.4 (L) 4.8 - 10.8 K/uL    RBC 3.60 (L) 4.20 - 5.40 M/uL    Hemoglobin 11.8 (L) 12.0 - 16.0 g/dL    Hematocrit 33.7 (L) 37.0 - 47.0 %    MCV 93.6 81.4 - 97.8 fL    MCH 32.8 27.0 - 33.0 pg    MCHC 35.0 33.6 - 35.0 g/dL    RDW 43.6 35.9 - 50.0 fL    Platelet Count 117 (L) 164 - 446 K/uL    MPV 11.5 9.0 - 12.9 fL    Neutrophils-Polys 63.10 44.00 - 72.00 %    Lymphocytes 19.60 (L) 22.00 - 41.00 %    Monocytes 13.20 0.00 - 13.40 %    Eosinophils 3.20 0.00 - 6.90 %    Basophils 0.90 0.00 - 1.80 %    Immature Granulocytes 0.00 0.00 - 0.90 %    Nucleated RBC 0.00 /100 WBC    Neutrophils (Absolute) 2.15 2.00 - 7.15 K/uL    Lymphs " (Absolute) 0.67 (L) 1.00 - 4.80 K/uL    Monos (Absolute) 0.45 0.00 - 0.85 K/uL    Eos (Absolute) 0.11 0.00 - 0.51 K/uL    Baso (Absolute) 0.03 0.00 - 0.12 K/uL    Immature Granulocytes (abs) 0.00 0.00 - 0.11 K/uL    NRBC (Absolute) 0.00 K/uL   COMP METABOLIC PANEL   Result Value Ref Range    Sodium 128 (L) 135 - 145 mmol/L    Potassium 3.9 3.6 - 5.5 mmol/L    Chloride 92 (L) 96 - 112 mmol/L    Co2 27 20 - 33 mmol/L    Anion Gap 9.0 0.0 - 11.9    Glucose 340 (H) 65 - 99 mg/dL    Bun 6 (L) 8 - 22 mg/dL    Creatinine 0.83 0.50 - 1.40 mg/dL    Calcium 8.4 8.4 - 10.2 mg/dL    AST(SGOT) 25 12 - 45 U/L    ALT(SGPT) 27 2 - 50 U/L    Alkaline Phosphatase 116 (H) 30 - 99 U/L    Total Bilirubin 0.8 0.1 - 1.5 mg/dL    Albumin 3.3 3.2 - 4.9 g/dL    Total Protein 5.7 (L) 6.0 - 8.2 g/dL    Globulin 2.4 1.9 - 3.5 g/dL    A-G Ratio 1.4 g/dL   ESTIMATED GFR   Result Value Ref Range    GFR If African American >60 >60 mL/min/1.73 m 2    GFR If Non African American >60 >60 mL/min/1.73 m 2   Influenza A/B By PCR (Adult - Flu Only)   Result Value Ref Range    Influenza virus A RNA Negative Negative    Influenza virus B, PCR Negative Negative   BNP   Result Value Ref Range    B Natriuretic Peptide 83 0 - 100 pg/mL   ACCU-CHEK GLUCOSE   Result Value Ref Range    Glucose - Accu-Ck 386 (H) 65 - 99 mg/dL   EKG   Result Value Ref Range    Report       [facility]    Test Date:  2019  Pt Name:    JAMAAL MANZANO             Department: EDS  MRN:        [id number]                      [facility]  Gender:     Female                       Technician: [id number]  :        [id number]                   Requested By:ER TRIAGE PROTOCOL  Order #:    [id number]                    Reading MD: TRAVON LOGAN MD    Measurements  Intervals                                Axis  Rate:       72                           P:          -17  WA:         156                          QRS:        34  QRSD:       78                            T:          37  QT:         356  QTc:        390    Interpretive Statements  SINUS RHYTHM  Compared to ECG 06/06/2018 11:19:33  No significant changes    Electronically Signed On 5- 18:58:52 PDT by TRAVON LOGAN MD         IMAGING  DX-CHEST-PORTABLE (1 VIEW)   Final Result      No radiographic evidence of acute cardiopulmonary process.          MEDICAL RECORD  I have reviewed patient's medical record and pertinent results are listed below.      COURSE & MEDICAL DECISION MAKING  I have reviewed any medical record information, laboratory studies and radiographic results as noted.    Yuki Riddle is a 53 y.o. female who presents complaining of shortness of breath and dry cough.  Patient is a smoker.  She is a history of pneumonia.  I do not suspect ACS or PE.  Patient has a persistent cough on exam and clinically appears to have an infectious process such as a viral illness, COPD exacerbation, or pneumonia.    Nebulizer and Tessalon Perles was ordered.    Chest x-ray demonstrates no pulmonary edema or focal consolidation.    Labs demonstrate a white blood cell count of 3.4, hemoglobin 11.8, platelet count of 117, hyponatremia of 128, chloride 92, glucose 340, and alk phos 116.    Pt was re-evaluated at 9:28 PM  Patient has received normal saline bolus of 1 L.  This was given for her hyperglycemia.  Patient was also given regular insulin 5 mg IV.  Patient feels much improved and states the albuterol and Tessalon Perles worked well for her.  Patient was not hypoxic on ambulation and was moving about the room and walking to the bathroom without apparent dyspnea.  I will discharge the patient with return precautions including chest pain, difficulty breathing, vomiting, fever, or other concerns.  Patient is aware that prednisone will elevate her blood sugars but will likely help her respiratory status and cough.  Prescription for albuterol and Tessalon Perles will also be  given.  She will follow-up with her PCP and her endocrinologist in the next 24 to 48 hours.  She is aware that her labs demonstrated leukopenia, anemia, and thrombocytopenia and that this will require follow-up.    Pt's blood pressure was noted to be above 120/80 here in the ER.  Pt was informed and advised to follow up as an outpatient for recheck for possible dx/management of hypertension.    FINAL IMPRESSION  1. Anemia, unspecified type    2. Thrombocytopenia (HCC)    3. Shortness of breath    4. Reactive airway disease with acute exacerbation, unspecified asthma severity, unspecified whether persistent    5. Hyperglycemia             Electronically signed by: Kari Dejesus, 5/23/2019 6:58 PM

## 2019-05-28 ENCOUNTER — TELEPHONE (OUTPATIENT)
Dept: MEDICAL GROUP | Facility: MEDICAL CENTER | Age: 54
End: 2019-05-28

## 2019-05-28 NOTE — TELEPHONE ENCOUNTER
1. Caller Name: panchito luque                                          Call Back Number: [phone] (home)         Patient approves a detailed voicemail message: yes    pt is asking if Dr Gr can order the Bone Marrow scan with sedation. Pt states she would rather have her PCP do this. pt was advise that she should continue to follow up with Dr Pathak as he is already handling this situation.

## 2019-05-29 NOTE — TELEPHONE ENCOUNTER
Please follow-up with the patient regarding what was done at her endocrinology visit yesterday.  If she would like to get this issue worked up through our clinic, my plan would be to repeat her CBC to confirm pancytopenia with additional labs (retic count, PT/INR, peripheral smear, B12, folate, CMP, HIV) and likely refer her to hematology if it is persistent, but I would also need her to come in for a visit.    Renate Gr M.D.

## 2019-07-01 ENCOUNTER — HOSPITAL ENCOUNTER (OUTPATIENT)
Dept: LAB | Facility: MEDICAL CENTER | Age: 54
End: 2019-07-01
Attending: INTERNAL MEDICINE
Payer: MEDICAID

## 2019-07-01 LAB
ALBUMIN SERPL BCP-MCNC: 4.1 G/DL (ref 3.2–4.9)
ALBUMIN/GLOB SERPL: 1.6 G/DL
ALP SERPL-CCNC: 136 U/L (ref 30–99)
ALT SERPL-CCNC: 29 U/L (ref 2–50)
ANION GAP SERPL CALC-SCNC: 5 MMOL/L (ref 0–11.9)
AST SERPL-CCNC: 24 U/L (ref 12–45)
BASOPHILS # BLD AUTO: 1.4 % (ref 0–1.8)
BASOPHILS # BLD: 0.09 K/UL (ref 0–0.12)
BILIRUB SERPL-MCNC: 0.3 MG/DL (ref 0.1–1.5)
BUN SERPL-MCNC: 18 MG/DL (ref 8–22)
CALCIUM SERPL-MCNC: 9 MG/DL (ref 8.5–10.5)
CHLORIDE SERPL-SCNC: 106 MMOL/L (ref 96–112)
CO2 SERPL-SCNC: 28 MMOL/L (ref 20–33)
CORTIS SERPL-MCNC: 10 UG/DL (ref 0–23)
CREAT SERPL-MCNC: 0.98 MG/DL (ref 0.5–1.4)
CREAT UR-MCNC: 157.1 MG/DL
EOSINOPHIL # BLD AUTO: 0.18 K/UL (ref 0–0.51)
EOSINOPHIL NFR BLD: 2.7 % (ref 0–6.9)
ERYTHROCYTE [DISTWIDTH] IN BLOOD BY AUTOMATED COUNT: 47.8 FL (ref 35.9–50)
ESTRADIOL SERPL-MCNC: <20 PG/ML
FERRITIN SERPL-MCNC: 49 NG/ML (ref 10–291)
FSH SERPL-ACNC: 19.5 MIU/ML
GLOBULIN SER CALC-MCNC: 2.6 G/DL (ref 1.9–3.5)
GLUCOSE SERPL-MCNC: 205 MG/DL (ref 65–99)
HCT VFR BLD AUTO: 42.5 % (ref 37–47)
HGB BLD-MCNC: 13.6 G/DL (ref 12–16)
IMM GRANULOCYTES # BLD AUTO: 0.01 K/UL (ref 0–0.11)
IMM GRANULOCYTES NFR BLD AUTO: 0.2 % (ref 0–0.9)
IRON SATN MFR SERPL: 28 % (ref 15–55)
IRON SERPL-MCNC: 96 UG/DL (ref 40–170)
LYMPHOCYTES # BLD AUTO: 1.38 K/UL (ref 1–4.8)
LYMPHOCYTES NFR BLD: 20.8 % (ref 22–41)
MCH RBC QN AUTO: 31.9 PG (ref 27–33)
MCHC RBC AUTO-ENTMCNC: 32 G/DL (ref 33.6–35)
MCV RBC AUTO: 99.5 FL (ref 81.4–97.8)
MONOCYTES # BLD AUTO: 0.43 K/UL (ref 0–0.85)
MONOCYTES NFR BLD AUTO: 6.5 % (ref 0–13.4)
NEUTROPHILS # BLD AUTO: 4.56 K/UL (ref 2–7.15)
NEUTROPHILS NFR BLD: 68.4 % (ref 44–72)
NRBC # BLD AUTO: 0 K/UL
NRBC BLD-RTO: 0 /100 WBC
PHOSPHATE SERPL-MCNC: 4.4 MG/DL (ref 2.5–4.5)
PLATELET # BLD AUTO: 184 K/UL (ref 164–446)
PMV BLD AUTO: 13.3 FL (ref 9–12.9)
POTASSIUM SERPL-SCNC: 4.6 MMOL/L (ref 3.6–5.5)
PROT SERPL-MCNC: 6.7 G/DL (ref 6–8.2)
PTH-INTACT SERPL-MCNC: 48.6 PG/ML (ref 14–72)
RBC # BLD AUTO: 4.27 M/UL (ref 4.2–5.4)
SODIUM SERPL-SCNC: 139 MMOL/L (ref 135–145)
T3FREE SERPL-MCNC: 3.23 PG/ML (ref 2.4–4.2)
T4 FREE SERPL-MCNC: 0.73 NG/DL (ref 0.53–1.43)
TIBC SERPL-MCNC: 347 UG/DL (ref 250–450)
TSH SERPL DL<=0.005 MIU/L-ACNC: 7.65 UIU/ML (ref 0.38–5.33)
WBC # BLD AUTO: 6.7 K/UL (ref 4.8–10.8)

## 2019-07-01 PROCEDURE — 83970 ASSAY OF PARATHORMONE: CPT

## 2019-07-01 PROCEDURE — 84100 ASSAY OF PHOSPHORUS: CPT

## 2019-07-01 PROCEDURE — 83520 IMMUNOASSAY QUANT NOS NONAB: CPT

## 2019-07-01 PROCEDURE — 82728 ASSAY OF FERRITIN: CPT

## 2019-07-01 PROCEDURE — 83945 ASSAY OF OXALATE: CPT

## 2019-07-01 PROCEDURE — 84105 ASSAY OF URINE PHOSPHORUS: CPT

## 2019-07-01 PROCEDURE — 80053 COMPREHEN METABOLIC PANEL: CPT

## 2019-07-01 PROCEDURE — 36415 COLL VENOUS BLD VENIPUNCTURE: CPT

## 2019-07-01 PROCEDURE — 82652 VIT D 1 25-DIHYDROXY: CPT

## 2019-07-01 PROCEDURE — 82670 ASSAY OF TOTAL ESTRADIOL: CPT

## 2019-07-01 PROCEDURE — 82570 ASSAY OF URINE CREATININE: CPT

## 2019-07-01 PROCEDURE — 83540 ASSAY OF IRON: CPT

## 2019-07-01 PROCEDURE — 82088 ASSAY OF ALDOSTERONE: CPT

## 2019-07-01 PROCEDURE — 84443 ASSAY THYROID STIM HORMONE: CPT

## 2019-07-01 PROCEDURE — 84439 ASSAY OF FREE THYROXINE: CPT

## 2019-07-01 PROCEDURE — 85025 COMPLETE CBC W/AUTO DIFF WBC: CPT

## 2019-07-01 PROCEDURE — 84481 FREE ASSAY (FT-3): CPT

## 2019-07-01 PROCEDURE — 82163 ASSAY OF ANGIOTENSIN II: CPT

## 2019-07-01 PROCEDURE — 83550 IRON BINDING TEST: CPT

## 2019-07-01 PROCEDURE — 84305 ASSAY OF SOMATOMEDIN: CPT

## 2019-07-01 PROCEDURE — 82533 TOTAL CORTISOL: CPT

## 2019-07-01 PROCEDURE — 82340 ASSAY OF CALCIUM IN URINE: CPT

## 2019-07-01 PROCEDURE — 83001 ASSAY OF GONADOTROPIN (FSH): CPT

## 2019-07-03 LAB
1,25(OH)2D3 SERPL-MCNC: 38 PG/ML (ref 19.9–79.3)
ALDOST SERPL-MCNC: 6.8 NG/DL
CALCIUM 24H UR-MCNC: 3.1 MG/DL
CALCIUM 24H UR-MRATE: NORMAL MG/D
CALCIUM/CREAT 24H UR: 23 MG/G (ref 20–300)
COLLECT DURATION TIME SPEC: NORMAL HRS
COLLECT DURATION TIME SPEC: NORMAL HRS
CREAT 24H UR-MCNC: 137 MG/DL
CREAT 24H UR-MRATE: NORMAL MG/D (ref 500–1400)
IGF-I SERPL-MCNC: 104 NG/ML (ref 53–234)
IGF-I Z-SCORE SERPL: -0.4
PHOSPHATE 24H UR-MCNC: 98 MG/DL
PHOSPHATE 24H UR-MRATE: NORMAL MG/D (ref 400–1300)
PHOSPHATE/CREAT 24H UR: 715 MG/G
SPECIMEN VOL ?TM UR: NORMAL ML
TOTAL VOLUME 1105: NORMAL ML

## 2019-07-04 LAB
COLLECT DURATION TIME SPEC: NORMAL H
CREAT 24H UR-MCNC: 137 MG/DL
LEPTIN SERPL-MCNC: 3.8 NG/ML (ref 0.5–15.2)
OXALATE 24H UR-MCNC: 39 MG/L
OXALATE 24H UR-MRATE: NORMAL MG/D (ref 13–40)
TOTAL VOLUME 1105: NORMAL ML

## 2019-07-18 LAB — ANGIOTENSIN II 5912: 16 NG/L

## 2019-08-05 ENCOUNTER — OFFICE VISIT (OUTPATIENT)
Dept: PULMONOLOGY | Facility: HOSPICE | Age: 54
End: 2019-08-05
Payer: MEDICAID

## 2019-08-05 ENCOUNTER — NON-PROVIDER VISIT (OUTPATIENT)
Dept: PULMONOLOGY | Facility: HOSPICE | Age: 54
End: 2019-08-05
Attending: NURSE PRACTITIONER
Payer: MEDICAID

## 2019-08-05 ENCOUNTER — APPOINTMENT (OUTPATIENT)
Dept: RADIOLOGY | Facility: IMAGING CENTER | Age: 54
End: 2019-08-05
Attending: NURSE PRACTITIONER
Payer: MEDICAID

## 2019-08-05 VITALS
BODY MASS INDEX: 24.99 KG/M2 | OXYGEN SATURATION: 92 % | HEART RATE: 86 BPM | DIASTOLIC BLOOD PRESSURE: 50 MMHG | HEIGHT: 65 IN | WEIGHT: 150 LBS | SYSTOLIC BLOOD PRESSURE: 118 MMHG | RESPIRATION RATE: 16 BRPM

## 2019-08-05 VITALS — BODY MASS INDEX: 24.96 KG/M2 | WEIGHT: 150 LBS

## 2019-08-05 DIAGNOSIS — G47.34 NOCTURNAL OXYGEN DESATURATION: ICD-10-CM

## 2019-08-05 DIAGNOSIS — J44.9 CHRONIC OBSTRUCTIVE PULMONARY DISEASE, UNSPECIFIED COPD TYPE (HCC): ICD-10-CM

## 2019-08-05 DIAGNOSIS — R93.89 ABNORMAL CAT SCAN: ICD-10-CM

## 2019-08-05 DIAGNOSIS — I31.9 CHEST PAIN OF PERICARDITIS: ICD-10-CM

## 2019-08-05 DIAGNOSIS — F17.200 TOBACCO DEPENDENCE: ICD-10-CM

## 2019-08-05 DIAGNOSIS — R09.02 HYPOXIA: ICD-10-CM

## 2019-08-05 DIAGNOSIS — Z71.6 TOBACCO ABUSE COUNSELING: ICD-10-CM

## 2019-08-05 PROCEDURE — 94010 BREATHING CAPACITY TEST: CPT | Performed by: INTERNAL MEDICINE

## 2019-08-05 PROCEDURE — 94726 PLETHYSMOGRAPHY LUNG VOLUMES: CPT | Performed by: INTERNAL MEDICINE

## 2019-08-05 PROCEDURE — 94761 N-INVAS EAR/PLS OXIMETRY MLT: CPT | Performed by: NURSE PRACTITIONER

## 2019-08-05 PROCEDURE — 94729 DIFFUSING CAPACITY: CPT | Performed by: INTERNAL MEDICINE

## 2019-08-05 PROCEDURE — 71046 X-RAY EXAM CHEST 2 VIEWS: CPT | Performed by: INTERNAL MEDICINE

## 2019-08-05 PROCEDURE — 99999 AMB MULTIPLE OXIMETRY: CPT | Performed by: NURSE PRACTITIONER

## 2019-08-05 PROCEDURE — 99214 OFFICE O/P EST MOD 30 MIN: CPT | Mod: 25 | Performed by: NURSE PRACTITIONER

## 2019-08-05 ASSESSMENT — PULMONARY FUNCTION TESTS
FEV1/FVC_PERCENT_PREDICTED: 100
FVC_PERCENT_PREDICTED: 82
FEV1_PREDICTED: 2.83
FEV1_LLN: 2.36
FVC: 2.98
FVC_LLN: 3.01
FEV1/FVC_PERCENT_PREDICTED: 97
FEV1/FVC_PREDICTED: 80
FEV1: 2.33
FEV1_PERCENT_PREDICTED: 82
FEV1/FVC: 78
FEV1/FVC: 78
FEV1/FVC_PERCENT_LLN: 67
FVC_PREDICTED: 3.6
FEV1/FVC_PERCENT_PREDICTED: 79

## 2019-08-05 NOTE — PROGRESS NOTES
CC:  Here for f/u sleep and pulmonary issues as listed below    HPI:   Yuki presents today for follow up for hx of COPD  and ER visit. PMH of DM1, fibromyalgia, opitate dependence, RA.       Patient originally was referred after an abnormal chest x-ray. CAT scan of the chest 1/2017 confirmed a 2.2 cm left lower lobe spiculated mass and borderline left infrahilar adenopathy, 1.2 cm triangular shaped soft tissue anterior superior mediastinum consistent with residual thymic tissue but thymic neoplasm not excluded, mild splenomegaly.    A PET scan was performed 1- showing no evidence of abnormal FDG accumulation, smaller nodule opacity in left lower lobe does not demonstrate uptake, likely resolving infection/inflammation.  Most recent CAT scan from 4/28/2017 showed consolidation in the left lower pulmonary nodule is no longer identified. Resolution of pneumonia is a possibility. Borderline prominent thymic tissue again noted without change, mildly enlarged spleen again identified.     Patient was hospitalized June 2018 for CAP and chest pain.  Troponins ×3  and stress test negative.  Echocardiogram from June 2018 showed ejection fraction of 65%.  CTA was completed negative for PE.  Multifocal consolidation.  Chest x-ray for follow-up completed today January 25, 2019 which I reviewed showed no pulmonary consolidation.    Patient went to ER May 2019 for shortness of breath. Was told she had abnormal labs and needed a bone marrow biopsy but subsequent labs were than normal.  She does have a primary she plans to follow-up with an endocrinologist.  Since May she admits she has not been active and more sedentary.  She has been having increased shortness of breath since that time especially with exertion, wheezing, cough with clear mucus, believe she has either bruised or has a broken lateral right rib.  She does admit she sleeps in a recliner that is broken and may have slept wrong.  Completed a chest x-ray,  which I reviewed, 8-5-2019 showing no acute cardiopulmonary process.  She has required rescue inhaler a few times a week, finding difficulty breathing in the hot weather.  She is concerned about her oxygen levels since May have not been as normal.  Multi-ox on room air at rest  93% with a heart rate of 82.  With exertion her oxygen increased to 99% with a heart rate of 95.     PFTs from 8/2019 are stable from 2017, however she did use albuterol just before testing, and indicate a Fev1 of 2.33L or 82% predicted, Fev1/FVC ratio of 78, DLCO 77%. She is a current everyday smoker typically less than 1 pack/day.  She was referred to the smoking cessation program, but has not followed up.       PSG from 7/2018 indicated an AHI of 2.3 and low oxygenation of 81%.    She is currently using 2 L nocturnal oxygen with benefit not waking up as frequently and waking up a little bit more refreshed.  They have trouble falling asleep. She will occasionally take Belsomra 15mg with benefit. She uses chronic pain meds. She finds Gabapentin helps the best with sleep. They do not feel refreshed in the morning and denies morning H/A. They feel tired throughout the day.          Patient Active Problem List    Diagnosis Date Noted   • Scalp lesion 04/03/2019   • Neurodermatitis 04/03/2019   • GERD (gastroesophageal reflux disease) 02/01/2019   • Cyst of skin of right breast 02/01/2019   • Nocturnal oxygen desaturation 01/25/2019   • Tobacco abuse counseling 01/25/2019   • Abnormal CAT scan 06/21/2018   • Muscle spasm 05/24/2018   • POTS (postural orthostatic tachycardia syndrome) 05/24/2018   • History of TIA (transient ischemic attack) 05/24/2018   • Recurrent UTI 05/23/2018   • Ovarian cyst 05/23/2018   • Drug-induced constipation 05/23/2018   • Insomnia 05/23/2018   • Anxiety 05/23/2018   • Rheumatoid arthritis (HCC) 05/23/2018   • Chronic pain of both knees 05/23/2018   • Type 1 diabetes mellitus with complication (HCC) 11/27/2017   •  Benzodiazepine dependence ([facility]) 11/27/2017   • Painful diabetic neuropathy ([facility]) 08/02/2017   • COPD (chronic obstructive pulmonary disease) ([facility]) 01/05/2017   • Opiate dependence ([facility]) 11/17/2013   • Tobacco dependence 08/26/2013   • Fibromyalgia 08/23/2013       Past Medical History:   Diagnosis Date   • Arthritis    • Cataract    • COPD (chronic obstructive pulmonary disease) ([facility])    • Diabetes     juvenile, type I   • Diabetic neuropathy ([facility])    • Diabetic retinopathy    • Diabetic retinopathy ([facility])    • Fibromyalgia    • Hypertension    • Migraine    • POTS (postural orthostatic tachycardia syndrome)     treated with hydrocodone   • Recurrent UTI    • Rheumatoid arthritis ([facility])    • Rheumatoid arthritis ([facility])    • Sleep apnea     cpap ordered, doesn't use   • TIA (transient ischemic attack)        Past Surgical History:   Procedure Laterality Date   • GASTROSCOPY WITH BALLOON DILATATION  2/13/2015    Performed by Venancio Aburto M.D. at SURGERY [facility]   • GASTROSCOPY WITH BIOPSY  2/13/2015    Performed by Venancio Aburto M.D. at SURGERY [facility]   • FOREIGN BODY REMOVAL  6/18/2013    Performed by Raz Mari M.D. at SURGERY SAME DAY [facility]   • CHOLECYSTECTOMY     • OTHER      Skin disorder of unknown name   • PRIMARY C SECTION     • TONSILLECTOMY         Family History   Problem Relation Age of Onset   • No Known Problems Sister    • No Known Problems Brother    • No Known Problems Brother    • No Known Problems Daughter    • No Known Problems Daughter        Social History     Socioeconomic History   • Marital status:      Spouse name: Not on file   • Number of children: Not on file   • Years of education: Not on file   • Highest education level: Not on file   Occupational History   • Not on file   Social Needs   • Financial resource strain: Not on file   • Food insecurity:     Worry: Not on file     Inability: Not on file   • Transportation needs:      Medical: Not on file     Non-medical: Not on file   Tobacco Use   • Smoking status: Current Every Day Smoker     Packs/day: 0.50     Years: 29.00     Pack years: 14.50     Types: Cigarettes   • Smokeless tobacco: Never Used   • Tobacco comment: 15-18 cigarettes a day    Substance and Sexual Activity   • Alcohol use: No   • Drug use: No   • Sexual activity: Not on file   Lifestyle   • Physical activity:     Days per week: Not on file     Minutes per session: Not on file   • Stress: Not on file   Relationships   • Social connections:     Talks on phone: Not on file     Gets together: Not on file     Attends Congregation service: Not on file     Active member of club or organization: Not on file     Attends meetings of clubs or organizations: Not on file     Relationship status: Not on file   • Intimate partner violence:     Fear of current or ex partner: Not on file     Emotionally abused: Not on file     Physically abused: Not on file     Forced sexual activity: Not on file   Other Topics Concern   • Not on file   Social History Narrative   • Not on file       Current Outpatient Medications   Medication Sig Dispense Refill   • hydrOXYzine HCl (ATARAX) 25 MG Tab Take 1 Tab by mouth 3 times a day as needed for Itching. 30 Tab 0   • lidocaine (XYLOCAINE) 5 % Ointment Apply small amount to painful lesion on scalp up to twice daily as needed 30 g 0   • B-D ULTRAFINE III SHORT PEN USE AS DIRECTED DAILY WITH INSULIN PEN  2   • diazepam (VALIUM) 10 MG tablet TK 1 T PO  EVERY 6 HOURS  5   • ONE TOUCH ULTRA TEST strip USE TO TEST BLOOD GLUCSE FIVE TIMES DAILY.  7   • HUMALOG KWIKPEN 100 UNIT/ML Solution Pen-injector injection INJECT SC UP TO 30 UNITS PER DAY PER SLIDING SCALE.  1   • PROMETHEGAN 25 MG Suppos INSERT 1 SUPPOSITORY RECTALLY PRN NAUSEA  2   • valACYclovir (VALTREX) 500 MG Tab Take 500 mg by mouth every day.  3   • albuterol 108 (90 Base) MCG/ACT Aero Soln inhalation aerosol Inhale 2 puffs by mouth every 4 hours as  needed for shortness of breath or wheezing  1 Inhaler 2   • cyclobenzaprine (FLEXERIL) 10 MG Tab Take 10 mg by mouth 3 times a day as needed for Muscle Spasms.     • gabapentin (NEURONTIN) 600 MG tablet Take 1,200 mg by mouth as needed.     • HYDROcodone/acetaminophen (NORCO)  MG Tab Take 2 Tabs by mouth 4 times a day.     • Insulin Glargine (TOUJEO SOLOSTAR) 300 UNIT/ML Solution Pen-injector Inject 28 Units as instructed every bedtime.     • HYDROcodone Bitartrate 50 MG Capsule Extended Release 12 hour Abuse-Deterrent Take 1 Tab by mouth every 12 hours. For Breakthrough pain     • albuterol 108 (90 Base) MCG/ACT Aero Soln inhalation aerosol Inhale 2 Puffs by mouth every 6 hours as needed for Shortness of Breath. 8.5 g 1   • benzonatate (TESSALON) 100 MG Cap Take 1 Cap by mouth 3 times a day as needed for Cough. (Patient not taking: Reported on 8/5/2019) 60 Cap 0   • cyanocobalamin (VITAMIN B-12) 1000 MCG/ML Solution INJECT 1 ML PER SUBCUTANEOUS ROUTE ONCE A WEEK  0   • ranitidine (ZANTAC) 300 MG tablet Take 1 Tab by mouth 1 time daily as needed for Heartburn. (Patient not taking: Reported on 4/3/2019) 30 Tab 3   • BELSOMRA 15 MG Tab TK 1 T PO QD  0   • calcitRIOL (ROCALTROL) 0.25 MCG Cap Take 0.25 mcg by mouth every 72 hours.     • cloNIDine (CATAPRES) 0.1 MG Tab Take 1 Tab by mouth at bedtime as needed (insomnia). (Patient not taking: Reported on 8/5/2019)       No current facility-administered medications for this visit.           Allergies: Compazine and Sulfa drugs      ROS   Gen: Denies fever, chills, unintentional weight loss, fatigue, night sweats  E/N/T: Denies ear pain, nasal congestion  Resp:Denies Dyspnea, wheezing, cough, sputum production, hemoptysis  CV: Denies chest pain, chest tightness, palpitations, BLE edema  Sleep:Denies morning headache, insomnia, daytime somnolence, snoring, gasping for air, apnea  Neuro: Denies frequent headaches, weakness, dizziness  GI: Denies N/V, acid  "reflux/heartburn  See HPI.  All other systems reviewed and negative      Vital signs for this encounter:  Vitals:    08/05/19 1605 08/05/19 1610   Height: 1.651 m (5' 5\")    Weight: 68 kg (150 lb)    Weight % change since last entry.: 0 %    BP: 118/50    Pulse: 86    BMI (Calculated): 24.96    Resp: 16    O2 sat % room air:  92 %                 Physical Exam:   Appearance: well developed, well nourished, no acute distress.  Eyes: PERRL, EOM intact, sclere white, conjunctiva moist.  Ears: no lesions or deformities.  Hearing: grossly intact.  Nose: no lesions or deformities.  Dentition: good dentition.  Oropharynx: tongue normal, posterior pharynx without erythema or exudate.  Neck: supple, trachea midline, no masses.  Respiratory effort: no intercostal retractions or use of accessory muscles.  Lung auscultation: Bilateral diminished   Heart auscultation: no murmur, rub, or gallop.   Extremities: no cyanosis or edema.  Abdomen: soft, non-tender, no masses.  Gait and station: grossly normal   Digits and Nails: no clubbing, cyanosis, petechiae, or nodes.  Cranial nerves: grossly normal.  Motor: no focal deficits observed.  Skin: no rashes, lesions, or ulcers noted.  Orientation: oriented to time, place, and person.  Mood and affect: mood and affect appropriate, normal interaction with examiner.    Assessment   1. Chest pain of pericarditis  DX-CHEST-2 VIEWS    Multiple Oximetry    Multiple Oximetry   2. Chronic obstructive pulmonary disease, unspecified COPD type (HCC)  Multiple Oximetry    Multiple Oximetry    REFERRAL TO TOBACCO CESSATION PROGRAM   3. Abnormal CAT scan  Multiple Oximetry    Multiple Oximetry   4. Nocturnal oxygen desaturation  Multiple Oximetry    Multiple Oximetry   5. Hypoxia  Multiple Oximetry    Multiple Oximetry    REFERRAL TO TOBACCO CESSATION PROGRAM   6. Tobacco abuse counseling  REFERRAL TO TOBACCO CESSATION PROGRAM   7. Tobacco dependence  REFERRAL TO TOBACCO CESSATION PROGRAM "       Patient was seen for 30 minutes, more than 50% of time spent in face to face review, counseling, and arranging future evaluation and follow up of medical conditions and care related to medication management, reassured patient that she does not have a broken rib per the chest x-ray and her oxygen actually improved with exertion, therefore encouraged light conditioning since she has been quite sedentary since May.  Encouraged to follow-up with smoking cessation program.  Reviewed PFTs noting normal spirometry.  But she does understand as time goes on and if she continues to smoke, they will worsen quicker.  Patient is clinically stable and will proceed with following plan.     PLAN:   Patient Instructions   1) Smoking cessation program referral   2) Continue Belsomra 15mg   3) Zpack and medrol pack on hand for emergency  4) Continue nocturnal oxygen at 2L  5) Vaccines: Up to date with Pneumovax 23. Prevnar 13. Declines flu.   6) Continue rescue as needed for shortness of breath and/or wheeze   7) Encourage light conditioning  8) Return in about 6 months (around 2/5/2020) for follow up with KHAI Anderson, if not sooner, review of symptoms.

## 2019-08-05 NOTE — PATIENT INSTRUCTIONS
1) Smoking cessation program referral   2) Continue Belsomra 15mg   3) Zpack and medrol pack on hand for emergency  4) Continue nocturnal oxygen at 2L  5) Vaccines: Up to date with Pneumovax 23. Prevnar 13. Declines flu.   6) Continue rescue as needed for shortness of breath and/or wheeze   7) Encourage light conditioning  8) Return in about 6 months (around 2/5/2020) for follow up with KHAI Anderson, if not sooner, review of symptoms.

## 2019-08-05 NOTE — PROCEDURES
Multi-Ox Readings  Multi Ox #1     O2 sat % at rest 93   O2 sat % on exertion 99   O2 sat average on exertion 95   Multi Ox #2     O2 sat % at rest     O2 sat % on exertion     O2 sat average on exertion       Oxygen Use     Oxygen Frequency     Duration of need     Is the patient mobile within the home?     CPAP Use?     BIPAP Use?     Servo Titration

## 2019-08-05 NOTE — PROCEDURES
Technician: MARK Gutiérrez    Technician Comment:  Good patient effort & cooperation.  The results of this test meet the ATS/ERS standards for acceptability & reproducibility.  Test was performed on the [employer] Body Plethysmograph-Elite DX system.  Predicted values were [facility]-3 for spirometry, [facility] for DLCO, [facility] for Lung Volumes.  The DLCO was uncorrected for Hgb.  Pt used albuterol HFA in the lobby prior to test.  Interpretation:  1.  Baseline spirometry shows normal air flows.  2.  There is no bronchodilator testing.  3.  Lung volumes are within normal limits.  4.  DLCO is mildly reduced at 77% of predicted.  Pulmonary function tests are within normal limits other than mildly reduced DLCO.  This may be seen in anemia or pulmonary vascular disease.  Suggest clinical correlation.

## 2019-12-13 DIAGNOSIS — E10.8 TYPE 1 DIABETES MELLITUS WITH COMPLICATION (HCC): ICD-10-CM

## 2019-12-13 RX ORDER — VALACYCLOVIR HYDROCHLORIDE 500 MG/1
500 TABLET, FILM COATED ORAL 2 TIMES DAILY
Qty: 10 TAB | Refills: 2 | Status: SHIPPED | OUTPATIENT
Start: 2019-12-13 | End: 2019-12-18

## 2019-12-14 NOTE — TELEPHONE ENCOUNTER
Was the patient seen in the last year in this department? Yes    Does patient have an active prescription for medications requested? Yes    Received Request Via: Pharmacy   Pt states she understands she needs an appt for all other meds, Pt states she is almost out of insulin and test strips. Pt stated she is currently experiencing genital herpes out break and is asking to have her valtrex refilled. Pt advised to call scheduling to establish an appt as soon as possible to address all other medical concerns.

## 2020-01-07 DIAGNOSIS — E10.8 TYPE 1 DIABETES MELLITUS WITH COMPLICATION (HCC): ICD-10-CM

## 2020-01-07 NOTE — TELEPHONE ENCOUNTER
Please contact pharmacy and asked them to reroute this request to her endocrinologist, Dr. Quesada, who is managing her diabetes.    Renate Gr M.D.

## 2020-10-06 ENCOUNTER — HOSPITAL ENCOUNTER (EMERGENCY)
Dept: HOSPITAL 8 - ED | Age: 55
LOS: 1 days | Discharge: HOME | End: 2020-10-07
Payer: MEDICAID

## 2020-10-06 VITALS — BODY MASS INDEX: 24.02 KG/M2 | HEIGHT: 65 IN | WEIGHT: 144.18 LBS

## 2020-10-06 VITALS — SYSTOLIC BLOOD PRESSURE: 150 MMHG | DIASTOLIC BLOOD PRESSURE: 94 MMHG

## 2020-10-06 DIAGNOSIS — M79.622: Primary | ICD-10-CM

## 2020-10-06 DIAGNOSIS — R07.89: ICD-10-CM

## 2020-10-06 DIAGNOSIS — F17.210: ICD-10-CM

## 2020-10-06 DIAGNOSIS — E16.2: ICD-10-CM

## 2020-10-06 DIAGNOSIS — E10.65: ICD-10-CM

## 2020-10-06 LAB
ALBUMIN SERPL-MCNC: 3.6 G/DL (ref 3.4–5)
ALP SERPL-CCNC: 144 U/L (ref 45–117)
ALT SERPL-CCNC: 22 U/L (ref 12–78)
ANION GAP SERPL CALC-SCNC: 6 MMOL/L (ref 5–15)
BASOPHILS # BLD AUTO: 0.1 X10^3/UL (ref 0–0.1)
BASOPHILS NFR BLD AUTO: 2 % (ref 0–1)
BILIRUB SERPL-MCNC: 0.3 MG/DL (ref 0.2–1)
CALCIUM SERPL-MCNC: 8.4 MG/DL (ref 8.5–10.1)
CHLORIDE SERPL-SCNC: 103 MMOL/L (ref 98–107)
CREAT SERPL-MCNC: 1.25 MG/DL (ref 0.55–1.02)
EOSINOPHIL # BLD AUTO: 0.1 X10^3/UL (ref 0–0.4)
EOSINOPHIL NFR BLD AUTO: 4 % (ref 1–7)
ERYTHROCYTE [DISTWIDTH] IN BLOOD BY AUTOMATED COUNT: 13.6 % (ref 9.6–15.2)
LYMPHOCYTES # BLD AUTO: 1.4 X10^3/UL (ref 1–3.4)
LYMPHOCYTES NFR BLD AUTO: 35 % (ref 22–44)
MCH RBC QN AUTO: 32.1 PG (ref 27–34.8)
MCHC RBC AUTO-ENTMCNC: 33.6 G/DL (ref 32.4–35.8)
MD: NO
MONOCYTES # BLD AUTO: 0.3 X10^3/UL (ref 0.2–0.8)
MONOCYTES NFR BLD AUTO: 6 % (ref 2–9)
NEUTROPHILS # BLD AUTO: 2.2 X10^3/UL (ref 1.8–6.8)
NEUTROPHILS NFR BLD AUTO: 54 % (ref 42–75)
PLATELET # BLD AUTO: 240 X10^3/UL (ref 130–400)
PMV BLD AUTO: 10.4 FL (ref 7.4–10.4)
PROT SERPL-MCNC: 6.5 G/DL (ref 6.4–8.2)
RBC # BLD AUTO: 3.7 X10^6/UL (ref 3.82–5.3)
TROPONIN I SERPL-MCNC: < 0.015 NG/ML (ref 0–0.04)

## 2020-10-06 PROCEDURE — 36415 COLL VENOUS BLD VENIPUNCTURE: CPT

## 2020-10-06 PROCEDURE — 85025 COMPLETE CBC W/AUTO DIFF WBC: CPT

## 2020-10-06 PROCEDURE — 84484 ASSAY OF TROPONIN QUANT: CPT

## 2020-10-06 PROCEDURE — 71045 X-RAY EXAM CHEST 1 VIEW: CPT

## 2020-10-06 PROCEDURE — 80053 COMPREHEN METABOLIC PANEL: CPT

## 2020-10-06 PROCEDURE — 99285 EMERGENCY DEPT VISIT HI MDM: CPT

## 2020-10-06 PROCEDURE — 93005 ELECTROCARDIOGRAM TRACING: CPT

## 2020-10-06 NOTE — NUR
Pt here for shoulder pain that moved to her left arm and then left neck. Pt 
reports no cardiac hx but is a type-1 diabetic. Pt reports she has smoked for 
many years though. pt reports she was concerned as she has never had pain like 
this and wanted to be evaluated. Pt connected to hr, spo2, bp , rr monitor. 
Call light  in reach and given 162mg of asa. Awaitng further orders.

## 2020-11-03 ENCOUNTER — HOSPITAL ENCOUNTER (EMERGENCY)
Dept: HOSPITAL 8 - ED | Age: 55
End: 2020-11-03
Payer: MEDICAID

## 2020-11-03 VITALS — SYSTOLIC BLOOD PRESSURE: 147 MMHG | DIASTOLIC BLOOD PRESSURE: 82 MMHG

## 2020-11-03 VITALS — BODY MASS INDEX: 23.8 KG/M2 | WEIGHT: 142.86 LBS | HEIGHT: 65 IN

## 2020-11-03 DIAGNOSIS — Z53.21: ICD-10-CM

## 2020-11-03 DIAGNOSIS — M54.9: Primary | ICD-10-CM

## 2020-11-11 ENCOUNTER — OFFICE VISIT (OUTPATIENT)
Dept: MEDICAL GROUP | Facility: MEDICAL CENTER | Age: 55
End: 2020-11-11
Attending: INTERNAL MEDICINE
Payer: MEDICAID

## 2020-11-11 VITALS
SYSTOLIC BLOOD PRESSURE: 122 MMHG | HEIGHT: 65 IN | WEIGHT: 142 LBS | BODY MASS INDEX: 23.66 KG/M2 | RESPIRATION RATE: 16 BRPM | DIASTOLIC BLOOD PRESSURE: 68 MMHG | HEART RATE: 90 BPM | OXYGEN SATURATION: 96 % | TEMPERATURE: 97.7 F

## 2020-11-11 DIAGNOSIS — M54.50 ACUTE MIDLINE LOW BACK PAIN WITHOUT SCIATICA: ICD-10-CM

## 2020-11-11 DIAGNOSIS — E11.40 PAINFUL DIABETIC NEUROPATHY (HCC): ICD-10-CM

## 2020-11-11 DIAGNOSIS — E10.8 TYPE 1 DIABETES MELLITUS WITH COMPLICATION (HCC): ICD-10-CM

## 2020-11-11 DIAGNOSIS — L84 PRE-ULCERATIVE CALLUSES: ICD-10-CM

## 2020-11-11 PROCEDURE — 99213 OFFICE O/P EST LOW 20 MIN: CPT | Performed by: INTERNAL MEDICINE

## 2020-11-11 PROCEDURE — 99214 OFFICE O/P EST MOD 30 MIN: CPT | Performed by: INTERNAL MEDICINE

## 2020-11-11 RX ORDER — BLOOD SUGAR DIAGNOSTIC
STRIP MISCELLANEOUS
COMMUNITY
Start: 2020-11-10

## 2020-11-11 RX ORDER — PROMETHAZINE HYDROCHLORIDE 25 MG/1
25 TABLET ORAL 2 TIMES DAILY
COMMUNITY
Start: 2020-09-06

## 2020-11-11 RX ORDER — ASPIRIN 81 MG/1
TABLET, CHEWABLE ORAL
COMMUNITY
Start: 2020-10-06 | End: 2022-08-18

## 2020-11-11 ASSESSMENT — FIBROSIS 4 INDEX: FIB4 SCORE: 1.31

## 2020-11-12 NOTE — PROGRESS NOTES
Subjective:   Yuki Riddle is a 55 y.o. female here today for back pain, callus on foot, neuropathy    Acute bilateral low back pain without sciatica  She states that on 10/18, she was helping a friend move a dresser.  States that the elevator door was closing in on the dresser and she stepped between it and the elevator door.  States that it hit her along her lower and mid spine multiple times and she was also pushing her back against the elevator to protect the dresser.  Since then she has had increased low and mid back pain especially over the bony parts of the spine.  She also reports new sharp pain shooting down her legs intermittently.  States that her pain medication is not helpful for this.  Since the injury, she has been able to walk but there have been days where she has been in enough pain that this has been severely limited.    Painful diabetic neuropathy (CMS-HCC)  She has a history of painful diabetic neuropathy in both of her hands as well as her feet.  She reports that if she wears wrist braces, she gets significant relief in her hands pain which she describes as burning as well as numbness and tingling.  She has been evaluated by orthopedics who does not believe she has carpal tunnel syndrome.  The wrist braces she is currently using are quite old and she is requesting a new pair.  In terms of her foot symptoms, she recently had what sounds like an ingrown toenail which she was unable to fully feel.  Fortunately, she was able to soak the toe and avoid a significant infection.  She would benefit from diabetic shoes which she has not had for over 10 years.    Pre-ulcerative calluses  She has a large callus over the fifth toe on the right foot.  States that she usually wears slippers and is unaware of any rubbing on the toe.  Denies bleeding, cracking, or signs of infection.       Current medicines (including changes today)  Current Outpatient Medications   Medication Sig Dispense Refill   •  Insulin Glargine (TOUJEO SOLOSTAR) 300 UNIT/ML Solution Pen-injector Inject 28 Units as instructed every bedtime. 4 PEN 0   • albuterol 108 (90 Base) MCG/ACT Aero Soln inhalation aerosol Inhale 2 Puffs by mouth every 6 hours as needed for Shortness of Breath. 8.5 g 1   • hydrOXYzine HCl (ATARAX) 25 MG Tab Take 1 Tab by mouth 3 times a day as needed for Itching. 30 Tab 0   • lidocaine (XYLOCAINE) 5 % Ointment Apply small amount to painful lesion on scalp up to twice daily as needed 30 g 0   • cyanocobalamin (VITAMIN B-12) 1000 MCG/ML Solution INJECT 1 ML PER SUBCUTANEOUS ROUTE ONCE A WEEK  0   • B-D ULTRAFINE III SHORT PEN USE AS DIRECTED DAILY WITH INSULIN PEN  2   • diazepam (VALIUM) 10 MG tablet TK 1 T PO  EVERY 6 HOURS  5   • HUMALOG KWIKPEN 100 UNIT/ML Solution Pen-injector injection INJECT SC UP TO 30 UNITS PER DAY PER SLIDING SCALE.  1   • PROMETHEGAN 25 MG Suppos INSERT 1 SUPPOSITORY RECTALLY PRN NAUSEA  2   • BELSOMRA 15 MG Tab TK 1 T PO QD  0   • albuterol 108 (90 Base) MCG/ACT Aero Soln inhalation aerosol Inhale 2 puffs by mouth every 4 hours as needed for shortness of breath or wheezing  1 Inhaler 2   • calcitRIOL (ROCALTROL) 0.25 MCG Cap Take 0.25 mcg by mouth every 72 hours.     • cyclobenzaprine (FLEXERIL) 10 MG Tab Take 10 mg by mouth 3 times a day as needed for Muscle Spasms.     • gabapentin (NEURONTIN) 600 MG tablet Take 1,200 mg by mouth as needed.     • HYDROcodone/acetaminophen (NORCO)  MG Tab Take 2 Tabs by mouth 4 times a day.     • HYDROcodone Bitartrate 50 MG Capsule Extended Release 12 hour Abuse-Deterrent Take 1 Tab by mouth every 12 hours. For Breakthrough pain       No current facility-administered medications for this visit.      She  has a past medical history of Arthritis, Cataract, COPD (chronic obstructive pulmonary disease) ([facility]), Diabetes, Diabetic neuropathy ([facility]), Diabetic retinopathy, Diabetic retinopathy ([facility]), Fibromyalgia, Hypertension, Migraine, POTS (postural  orthostatic tachycardia syndrome), Recurrent UTI, Rheumatoid arthritis (HCC), Rheumatoid arthritis (HCC), Sleep apnea, and TIA (transient ischemic attack).    ROS   Denies chest pain, shortness of breath  Reports pain in right ear  As above in HPI     Objective:     Vitals:    11/11/20 1601   BP: 122/68   Pulse: 90   Resp: 16   Temp: 36.5 °C (97.7 °F)   SpO2: 96%     Body mass index is 23.63 kg/m².   Physical Exam:  Constitutional: Alert, no distress.  Skin: Warm, dry, good turgor, no rashes in visible areas, preulcerative callus over top of right fifth toe  Eye: Equal, round and reactive, conjunctiva clear, lids normal.  ENMT: Right TM impacted with cerumen  MSK: Tender to palpation over bony prominences of lower thoracic and upper lumbar spine, normal gait      Assessment and Plan:   The following treatment plan was discussed    1. Painful diabetic neuropathy (HCC)  She would benefit from a pair of diabetic shoes both to help with her calluses and because of her neuropathy.  I have sent a referral today.  For the neuropathy in her hands, she would benefit from bilateral wrist braces which were given to her in clinic today  -Provided with bilateral wrist braces  - REFERRAL FOR ORTHOTICS    2. Pre-ulcerative calluses  As discussed above  - REFERRAL FOR ORTHOTICS    3. Type 1 diabetes mellitus with complication (HCC)  She continues to follow with Dr. Roa of endocrinology who is managing her medication  - REFERRAL FOR ORTHOTICS    4. Acute midline low back pain without sciatica  We will obtain imaging as below to make sure she did not sustain any occult fracture   - DX-LUMBAR SPINE-2 OR 3 VIEWS; Future  - DX-THORACIC SPINE-2 VIEWS; Future        Followup: Return if symptoms worsen or fail to improve.

## 2020-11-12 NOTE — ASSESSMENT & PLAN NOTE
She has a large callus over the fifth toe on the right foot.  States that she usually wears slippers and is unaware of any rubbing on the toe.  Denies bleeding, cracking, or signs of infection.

## 2020-11-12 NOTE — ASSESSMENT & PLAN NOTE
She states that on 10/18, she was helping a friend move a dresser.  States that the elevator door was closing in on the dresser and she stepped between it and the elevator door.  States that it hit her along her lower and mid spine multiple times and she was also pushing her back against the elevator to protect the dresser.  Since then she has had increased low and mid back pain especially over the bony parts of the spine.  She also reports new sharp pain shooting down her legs intermittently.  States that her pain medication is not helpful for this.  Since the injury, she has been able to walk but there have been days where she has been in enough pain that this has been severely limited.

## 2020-11-12 NOTE — ASSESSMENT & PLAN NOTE
She has a history of painful diabetic neuropathy in both of her hands as well as her feet.  She reports that if she wears wrist braces, she gets significant relief in her hands pain which she describes as burning as well as numbness and tingling.  She has been evaluated by orthopedics who does not believe she has carpal tunnel syndrome.  The wrist braces she is currently using are quite old and she is requesting a new pair.  In terms of her foot symptoms, she recently had what sounds like an ingrown toenail which she was unable to fully feel.  Fortunately, she was able to soak the toe and avoid a significant infection.  She would benefit from diabetic shoes which she has not had for over 10 years.

## 2020-11-20 ENCOUNTER — APPOINTMENT (OUTPATIENT)
Dept: RADIOLOGY | Facility: MEDICAL CENTER | Age: 55
End: 2020-11-20
Attending: INTERNAL MEDICINE
Payer: COMMERCIAL

## 2020-11-22 ENCOUNTER — HOSPITAL ENCOUNTER (EMERGENCY)
Dept: HOSPITAL 8 - ED | Age: 55
Discharge: HOME | End: 2020-11-22
Payer: MEDICAID

## 2020-11-22 VITALS — DIASTOLIC BLOOD PRESSURE: 98 MMHG | SYSTOLIC BLOOD PRESSURE: 164 MMHG

## 2020-11-22 VITALS — HEIGHT: 65 IN | BODY MASS INDEX: 24.06 KG/M2 | WEIGHT: 144.4 LBS

## 2020-11-22 DIAGNOSIS — Z90.49: ICD-10-CM

## 2020-11-22 DIAGNOSIS — F17.200: ICD-10-CM

## 2020-11-22 DIAGNOSIS — M54.5: Primary | ICD-10-CM

## 2020-11-22 DIAGNOSIS — E11.9: ICD-10-CM

## 2020-11-22 PROCEDURE — 72110 X-RAY EXAM L-2 SPINE 4/>VWS: CPT

## 2020-11-22 PROCEDURE — 96372 THER/PROPH/DIAG INJ SC/IM: CPT

## 2020-11-22 PROCEDURE — 99284 EMERGENCY DEPT VISIT MOD MDM: CPT

## 2020-11-22 NOTE — NUR
BREAK RN: PT REPORTS LOW BACK PAIN X1 MONTH SINCE A ELEVATOR ACCIDENT. VS 
STABLE. PT IS NOT ABLE TO LAY IN THE BED. PT REPORTS SHE WANTS TO SIT IN THE 
WHEELCHAIR FOR COMFORT. CALL LIGHT IN PLACE. REPORT GIVEN TO NALLELY DANIEL

## 2021-01-08 ENCOUNTER — TELEPHONE (OUTPATIENT)
Dept: MEDICAL GROUP | Facility: MEDICAL CENTER | Age: 56
End: 2021-01-08

## 2021-01-08 NOTE — TELEPHONE ENCOUNTER
1. Caller Name: Yuki Riddle                        Call Back Number: [phone] (home)       How would the patient prefer to be contacted with a response: Phone call OK to leave a detailed message    Pt called and stated she is having issues getting her Pain meds from her pain managment. She stated thaat they are not able to refill her controlled medications and would like to know if Dr black will take over the medications

## 2021-01-09 NOTE — TELEPHONE ENCOUNTER
Please inform patient that I am not able to take over her controlled substance prescriptions at this time due to the high quantities that she is on as well as the combination of benzodiazepines and opiates.  Unfortunately, if her current prescriber is unable to continue her prescriptions I would recommend some sort of inpatient supervision for detox such as [facility], [facility], or even inpatient hospitalization given her very high doses.  Another consideration in the interim could be methadone or Suboxone clinic.    Renate Gr M.D.

## 2021-01-09 NOTE — TELEPHONE ENCOUNTER
Spoke with Pt, Pt stated that her pain management was able to get an exemption and was able to continue her medications.

## 2021-01-27 ENCOUNTER — HOSPITAL ENCOUNTER (EMERGENCY)
Facility: MEDICAL CENTER | Age: 56
End: 2021-01-27
Attending: EMERGENCY MEDICINE
Payer: MEDICAID

## 2021-01-27 ENCOUNTER — APPOINTMENT (OUTPATIENT)
Dept: RADIOLOGY | Facility: MEDICAL CENTER | Age: 56
End: 2021-01-27
Attending: EMERGENCY MEDICINE
Payer: MEDICAID

## 2021-01-27 VITALS
SYSTOLIC BLOOD PRESSURE: 120 MMHG | HEIGHT: 65 IN | HEART RATE: 95 BPM | WEIGHT: 151.46 LBS | OXYGEN SATURATION: 97 % | DIASTOLIC BLOOD PRESSURE: 69 MMHG | BODY MASS INDEX: 25.23 KG/M2 | RESPIRATION RATE: 16 BRPM | TEMPERATURE: 98 F

## 2021-01-27 DIAGNOSIS — G50.0 TRIGEMINAL NERVE SPASM: ICD-10-CM

## 2021-01-27 PROCEDURE — 99283 EMERGENCY DEPT VISIT LOW MDM: CPT

## 2021-01-27 PROCEDURE — 700102 HCHG RX REV CODE 250 W/ 637 OVERRIDE(OP): Performed by: EMERGENCY MEDICINE

## 2021-01-27 PROCEDURE — A9270 NON-COVERED ITEM OR SERVICE: HCPCS | Performed by: EMERGENCY MEDICINE

## 2021-01-27 PROCEDURE — 70450 CT HEAD/BRAIN W/O DYE: CPT

## 2021-01-27 RX ORDER — CARBAMAZEPINE 100 MG/1
100 TABLET, CHEWABLE ORAL 3 TIMES DAILY
Qty: 90 TAB | Refills: 0 | Status: SHIPPED | OUTPATIENT
Start: 2021-01-27 | End: 2022-08-18

## 2021-01-27 RX ORDER — IBUPROFEN 600 MG/1
600 TABLET ORAL ONCE
Status: COMPLETED | OUTPATIENT
Start: 2021-01-27 | End: 2021-01-27

## 2021-01-27 RX ORDER — PREDNISONE 50 MG/1
20 TABLET ORAL DAILY
Qty: 5 TAB | Refills: 0 | Status: SHIPPED | OUTPATIENT
Start: 2021-01-27 | End: 2021-02-01

## 2021-01-27 RX ORDER — IBUPROFEN 600 MG/1
600 TABLET ORAL EVERY 6 HOURS PRN
Qty: 30 TAB | Refills: 0 | Status: ON HOLD | OUTPATIENT
Start: 2021-01-27 | End: 2023-10-01

## 2021-01-27 RX ADMIN — IBUPROFEN 600 MG: 600 TABLET ORAL at 18:39

## 2021-01-27 ASSESSMENT — FIBROSIS 4 INDEX: FIB4 SCORE: 1.31

## 2021-01-28 NOTE — ED NOTES
Pt verbalizes understanding of discharge and follow-up instructions.  Given Rx X3.  VSS.  All questions answered.  Ambulates to discharge with steady gait.

## 2021-01-28 NOTE — ED TRIAGE NOTES
Chief Complaint   Patient presents with   • Headache     right side of head and face   • Sent by MD   • Rash     right side of forehead     Pt ambulated to triage with above complaints. Pt said that she started taking acyclovir for possible shingles.

## 2021-01-28 NOTE — ED PROVIDER NOTES
"ED Provider Note    CHIEF COMPLAINT  Chief Complaint   Patient presents with   • Headache     right side of head and face   • Sent by MD   • Rash     right side of forehead       HPI  Yuki Riddle is a 55 y.o. female here for evaluation of headache.  Patient states that patient has been having intermittent headaches over the last 2 or 3 months.  Patient states that she developed some redness to her forehead, on the right and left side, and states that she also has some \"electric shocks\" originating from the right side of her head to the forehead left cheek and below her chin.  She states that these come and go, and are not specific to a particular event.  She has no fever chills, nausea vomiting, and no chest pain or shortness of breath.  No abdominal pain, or back pain.  No weakness.  Patient states that she normally does not get headaches, but these have been increasing in frequency and severity.      ROS  See HPI for further details, o/w negative.     PAST MEDICAL HISTORY   has a past medical history of Arthritis, Cataract, COPD (chronic obstructive pulmonary disease) (HCC), Diabetes, Diabetic neuropathy (HCC), Diabetic retinopathy, Diabetic retinopathy (HCC), Fibromyalgia, Hypertension, Migraine, POTS (postural orthostatic tachycardia syndrome), Recurrent UTI, Rheumatoid arthritis (HCC), Rheumatoid arthritis (HCC), Sleep apnea, and TIA (transient ischemic attack).    SOCIAL HISTORY  Social History     Tobacco Use   • Smoking status: Current Every Day Smoker     Packs/day: 0.50     Years: 29.00     Pack years: 14.50     Types: Cigarettes   • Smokeless tobacco: Never Used   • Tobacco comment: 15-18 cigarettes a day    Substance and Sexual Activity   • Alcohol use: No   • Drug use: No   • Sexual activity: Not on file       Family History  No bleeding disorders    SURGICAL HISTORY   has a past surgical history that includes tonsillectomy; cholecystectomy; primary c section; other; foreign body removal " (6/18/2013); gastroscopy with balloon dilatation (2/13/2015); and gastroscopy with biopsy (2/13/2015).    CURRENT MEDICATIONS  Home Medications    **Home medications have not yet been reviewed for this encounter**         ALLERGIES  Allergies   Allergen Reactions   • Compazine      Aggressive behavior    • Sulfa Drugs Itching     Blotchy spots on chest       REVIEW OF SYSTEMS  See HPI for further details. Review of systems as above, otherwise all other systems are negative.     PHYSICAL EXAM  Constitutional: Well developed, well nourished. No acute distress.  HEENT: Normocephalic, atraumatic. Posterior pharynx clear and moist.  Eyes:  EOMI. Normal sclera.  Neck: Supple, Full range of motion, nontender.  Chest/Pulmonary: clear to ausculation. Symmetrical expansion.   Cardio: Regular rate and rhythm with no murmur.   Abdomen: Soft, nontender. No peritoneal signs. No guarding. No palpable masses.  Musculoskeletal: No deformity, no edema, neurovascular intact.   Neuro: Clear speech, appropriate, cooperative, cranial nerves II-XII grossly intact.  Moves all extremities x4.  Psych: Normal mood and affect  Skin; macular papular erythema noted to the forehead.  Blanching, no vesicles.      CT-HEAD W/O   Final Result      Negative noncontrast CT scan of the head / brain.          PROCEDURES     MEDICAL RECORD  I have reviewed patient's medical record and pertinent results are listed.    COURSE & MEDICAL DECISION MAKING  I have reviewed any medical record information, laboratory studies and radiographic results as noted above.    6:19 PM  The pt has no focal neuro deficits. She has a negative ct scan, and I will prescribe carbamazepine as well.  She will follow up with her doctor tomorrow. I will also provide her a short course of steroids for the rash.  The pt is aware that prednisone can affect her blood sugars, and will closely monitor these. She has been on this before.     If you have had any blood pressure issues while  here in the emergency department, please see your doctor for a further evaluation or work up.    Differential diagnoses include but not limited to: trigeminal neuralgia, cva, tia,     This patient presents with trigeminal neuralgia .  At this time, I have counseled the patient/family regarding their medications, pain control, and follow up.  They will continue their medications, if any, as prescribed.  They will return immediately for any worsening symptoms and/or any other medical concerns.  They will see their doctor, or contact the doctor provided, in 1-2 days for follow up.       FINAL IMPRESSION  Trigeminal neuralgia   Headache         Electronically signed by: Reji Collado D.O., 1/27/2021 5:32 PM

## 2021-01-29 ENCOUNTER — APPOINTMENT (OUTPATIENT)
Dept: RADIOLOGY | Facility: MEDICAL CENTER | Age: 56
End: 2021-01-29
Attending: EMERGENCY MEDICINE
Payer: MEDICAID

## 2021-01-29 ENCOUNTER — HOSPITAL ENCOUNTER (EMERGENCY)
Facility: MEDICAL CENTER | Age: 56
End: 2021-01-29
Attending: EMERGENCY MEDICINE
Payer: MEDICAID

## 2021-01-29 ENCOUNTER — APPOINTMENT (OUTPATIENT)
Dept: RADIOLOGY | Facility: MEDICAL CENTER | Age: 56
End: 2021-01-29
Attending: PSYCHIATRY & NEUROLOGY
Payer: MEDICAID

## 2021-01-29 VITALS
HEIGHT: 65 IN | SYSTOLIC BLOOD PRESSURE: 128 MMHG | DIASTOLIC BLOOD PRESSURE: 60 MMHG | BODY MASS INDEX: 25.56 KG/M2 | OXYGEN SATURATION: 98 % | WEIGHT: 153.44 LBS | TEMPERATURE: 98.4 F | HEART RATE: 74 BPM | RESPIRATION RATE: 16 BRPM

## 2021-01-29 DIAGNOSIS — G44.209 ACUTE NON INTRACTABLE TENSION-TYPE HEADACHE: ICD-10-CM

## 2021-01-29 DIAGNOSIS — R29.810 FACIAL DROOP: ICD-10-CM

## 2021-01-29 DIAGNOSIS — G08 INTRACRANIAL AND INTRASPINAL PHLEBITIS AND THROMBOPHLEBITIS: ICD-10-CM

## 2021-01-29 DIAGNOSIS — R51.9 INTRACTABLE HEADACHE, UNSPECIFIED CHRONICITY PATTERN, UNSPECIFIED HEADACHE TYPE: ICD-10-CM

## 2021-01-29 DIAGNOSIS — H53.8 BLURRED VISION: ICD-10-CM

## 2021-01-29 LAB
ABO GROUP BLD: NORMAL
ALBUMIN SERPL BCP-MCNC: 3.9 G/DL (ref 3.2–4.9)
ALBUMIN/GLOB SERPL: 1.6 G/DL
ALP SERPL-CCNC: 129 U/L (ref 30–99)
ALT SERPL-CCNC: 19 U/L (ref 2–50)
ANION GAP SERPL CALC-SCNC: 9 MMOL/L (ref 7–16)
APTT PPP: 26.1 SEC (ref 24.7–36)
AST SERPL-CCNC: 23 U/L (ref 12–45)
BASOPHILS # BLD AUTO: 0.3 % (ref 0–1.8)
BASOPHILS # BLD: 0.02 K/UL (ref 0–0.12)
BILIRUB SERPL-MCNC: 0.3 MG/DL (ref 0.1–1.5)
BLD GP AB SCN SERPL QL: NORMAL
BUN SERPL-MCNC: 21 MG/DL (ref 8–22)
CALCIUM SERPL-MCNC: 9.1 MG/DL (ref 8.5–10.5)
CHLORIDE SERPL-SCNC: 98 MMOL/L (ref 96–112)
CO2 SERPL-SCNC: 27 MMOL/L (ref 20–33)
CREAT SERPL-MCNC: 1.05 MG/DL (ref 0.5–1.4)
EOSINOPHIL # BLD AUTO: 0.16 K/UL (ref 0–0.51)
EOSINOPHIL NFR BLD: 2.8 % (ref 0–6.9)
ERYTHROCYTE [DISTWIDTH] IN BLOOD BY AUTOMATED COUNT: 46.7 FL (ref 35.9–50)
GLOBULIN SER CALC-MCNC: 2.5 G/DL (ref 1.9–3.5)
GLUCOSE SERPL-MCNC: 268 MG/DL (ref 65–99)
HCT VFR BLD AUTO: 31.6 % (ref 37–47)
HGB BLD-MCNC: 10.6 G/DL (ref 12–16)
IMM GRANULOCYTES # BLD AUTO: 0.05 K/UL (ref 0–0.11)
IMM GRANULOCYTES NFR BLD AUTO: 0.9 % (ref 0–0.9)
INR PPP: 0.88 (ref 0.87–1.13)
LYMPHOCYTES # BLD AUTO: 0.52 K/UL (ref 1–4.8)
LYMPHOCYTES NFR BLD: 9.1 % (ref 22–41)
MCH RBC QN AUTO: 31.8 PG (ref 27–33)
MCHC RBC AUTO-ENTMCNC: 33.5 G/DL (ref 33.6–35)
MCV RBC AUTO: 94.9 FL (ref 81.4–97.8)
MONOCYTES # BLD AUTO: 0.51 K/UL (ref 0–0.85)
MONOCYTES NFR BLD AUTO: 8.9 % (ref 0–13.4)
NEUTROPHILS # BLD AUTO: 4.48 K/UL (ref 2–7.15)
NEUTROPHILS NFR BLD: 78 % (ref 44–72)
NRBC # BLD AUTO: 0 K/UL
NRBC BLD-RTO: 0 /100 WBC
PLATELET # BLD AUTO: 161 K/UL (ref 164–446)
PMV BLD AUTO: 11.7 FL (ref 9–12.9)
POTASSIUM SERPL-SCNC: 4.3 MMOL/L (ref 3.6–5.5)
PROT SERPL-MCNC: 6.4 G/DL (ref 6–8.2)
PROTHROMBIN TIME: 12.2 SEC (ref 12–14.6)
RBC # BLD AUTO: 3.33 M/UL (ref 4.2–5.4)
RH BLD: NORMAL
SODIUM SERPL-SCNC: 134 MMOL/L (ref 135–145)
TROPONIN T SERPL-MCNC: 9 NG/L (ref 6–19)
WBC # BLD AUTO: 5.7 K/UL (ref 4.8–10.8)

## 2021-01-29 PROCEDURE — 86900 BLOOD TYPING SEROLOGIC ABO: CPT

## 2021-01-29 PROCEDURE — 0042T CT-CEREBRAL PERFUSION ANALYSIS: CPT

## 2021-01-29 PROCEDURE — 94760 N-INVAS EAR/PLS OXIMETRY 1: CPT

## 2021-01-29 PROCEDURE — 83519 RIA NONANTIBODY: CPT

## 2021-01-29 PROCEDURE — 86901 BLOOD TYPING SEROLOGIC RH(D): CPT

## 2021-01-29 PROCEDURE — 700117 HCHG RX CONTRAST REV CODE 255: Performed by: EMERGENCY MEDICINE

## 2021-01-29 PROCEDURE — 70498 CT ANGIOGRAPHY NECK: CPT

## 2021-01-29 PROCEDURE — 86850 RBC ANTIBODY SCREEN: CPT

## 2021-01-29 PROCEDURE — 36415 COLL VENOUS BLD VENIPUNCTURE: CPT

## 2021-01-29 PROCEDURE — 85025 COMPLETE CBC W/AUTO DIFF WBC: CPT

## 2021-01-29 PROCEDURE — 71045 X-RAY EXAM CHEST 1 VIEW: CPT

## 2021-01-29 PROCEDURE — 96366 THER/PROPH/DIAG IV INF ADDON: CPT

## 2021-01-29 PROCEDURE — 700111 HCHG RX REV CODE 636 W/ 250 OVERRIDE (IP): Performed by: PSYCHIATRY & NEUROLOGY

## 2021-01-29 PROCEDURE — A9270 NON-COVERED ITEM OR SERVICE: HCPCS | Performed by: EMERGENCY MEDICINE

## 2021-01-29 PROCEDURE — 93005 ELECTROCARDIOGRAM TRACING: CPT | Performed by: EMERGENCY MEDICINE

## 2021-01-29 PROCEDURE — 700111 HCHG RX REV CODE 636 W/ 250 OVERRIDE (IP): Performed by: EMERGENCY MEDICINE

## 2021-01-29 PROCEDURE — 96375 TX/PRO/DX INJ NEW DRUG ADDON: CPT | Mod: XU

## 2021-01-29 PROCEDURE — 85730 THROMBOPLASTIN TIME PARTIAL: CPT

## 2021-01-29 PROCEDURE — 70460 CT HEAD/BRAIN W/DYE: CPT

## 2021-01-29 PROCEDURE — 96365 THER/PROPH/DIAG IV INF INIT: CPT | Mod: XU

## 2021-01-29 PROCEDURE — 700102 HCHG RX REV CODE 250 W/ 637 OVERRIDE(OP): Performed by: EMERGENCY MEDICINE

## 2021-01-29 PROCEDURE — 85610 PROTHROMBIN TIME: CPT

## 2021-01-29 PROCEDURE — 99285 EMERGENCY DEPT VISIT HI MDM: CPT

## 2021-01-29 PROCEDURE — 83516 IMMUNOASSAY NONANTIBODY: CPT | Mod: XU

## 2021-01-29 PROCEDURE — 84484 ASSAY OF TROPONIN QUANT: CPT

## 2021-01-29 PROCEDURE — 70496 CT ANGIOGRAPHY HEAD: CPT

## 2021-01-29 PROCEDURE — 70450 CT HEAD/BRAIN W/O DYE: CPT

## 2021-01-29 PROCEDURE — 700105 HCHG RX REV CODE 258: Performed by: EMERGENCY MEDICINE

## 2021-01-29 PROCEDURE — 80053 COMPREHEN METABOLIC PANEL: CPT

## 2021-01-29 RX ORDER — MAGNESIUM SULFATE HEPTAHYDRATE 40 MG/ML
2 INJECTION, SOLUTION INTRAVENOUS ONCE
Status: COMPLETED | OUTPATIENT
Start: 2021-01-29 | End: 2021-01-29

## 2021-01-29 RX ORDER — METOCLOPRAMIDE HYDROCHLORIDE 5 MG/ML
10 INJECTION INTRAMUSCULAR; INTRAVENOUS ONCE
Status: COMPLETED | OUTPATIENT
Start: 2021-01-29 | End: 2021-01-29

## 2021-01-29 RX ORDER — ACETAMINOPHEN 500 MG
1000 TABLET ORAL ONCE
Status: COMPLETED | OUTPATIENT
Start: 2021-01-29 | End: 2021-01-29

## 2021-01-29 RX ORDER — SODIUM CHLORIDE 9 MG/ML
1000 INJECTION, SOLUTION INTRAVENOUS ONCE
Status: COMPLETED | OUTPATIENT
Start: 2021-01-29 | End: 2021-01-29

## 2021-01-29 RX ORDER — DIPHENHYDRAMINE HYDROCHLORIDE 50 MG/ML
25 INJECTION INTRAMUSCULAR; INTRAVENOUS ONCE
Status: COMPLETED | OUTPATIENT
Start: 2021-01-29 | End: 2021-01-29

## 2021-01-29 RX ADMIN — IOHEXOL 80 ML: 350 INJECTION, SOLUTION INTRAVENOUS at 19:32

## 2021-01-29 RX ADMIN — METOCLOPRAMIDE 10 MG: 5 INJECTION, SOLUTION INTRAMUSCULAR; INTRAVENOUS at 19:59

## 2021-01-29 RX ADMIN — MAGNESIUM SULFATE 2 G: 2 INJECTION INTRAVENOUS at 20:05

## 2021-01-29 RX ADMIN — IOHEXOL 40 ML: 350 INJECTION, SOLUTION INTRAVENOUS at 19:34

## 2021-01-29 RX ADMIN — SODIUM CHLORIDE 1000 ML: 9 INJECTION, SOLUTION INTRAVENOUS at 19:59

## 2021-01-29 RX ADMIN — ACETAMINOPHEN 1000 MG: 500 TABLET ORAL at 19:59

## 2021-01-29 RX ADMIN — DIPHENHYDRAMINE HYDROCHLORIDE 25 MG: 50 INJECTION INTRAMUSCULAR; INTRAVENOUS at 20:04

## 2021-01-29 ASSESSMENT — LIFESTYLE VARIABLES: DO YOU DRINK ALCOHOL: NO

## 2021-01-29 ASSESSMENT — FIBROSIS 4 INDEX: FIB4 SCORE: 1.31

## 2021-01-29 NOTE — ED TRIAGE NOTES
Yuki Riddle  55 y.o. female  Chief Complaint   Patient presents with   • Facial Droop   • Arm Pain     Patient reports bilateral eye droops that started last night. Patient was recently diagnosed for trigeminal neuralgia, which she had right facial pain and a rash to forehead. Patient reports persistent right facial pain but rash has improved. She spoke to her endocrinologist and advised her to come to ED.    Patient also reports she has developed right arm and right arm weakness for x1 month. Patient has a brace in place. She states she has been having difficulty doing fine motor skills with her right hand.

## 2021-01-30 LAB — EKG IMPRESSION: NORMAL

## 2021-01-30 NOTE — PROGRESS NOTES
Brief Neurology Note    The patient's chart has been reviewed. Case discussed with Dr. MILVIA Parisi.    Patient presents for the second time this week complaining of headache and subjective visual complaints.    DX-CHEST-PORTABLE (1 VIEW)   Final Result      No acute cardiopulmonary disease.      CT-HEAD WITH   Final Result            No evidence of acute cerebral infarction, hemorrhage, mass lesion, or abnormal enhancement.      If there is concern for cranial nerve abnormality, further evaluation with MRI is recommended.      CT-CEREBRAL PERFUSION ANALYSIS   Final Result      1.  Cerebral blood flow less than 30% likely representing completed infarct = 0 mL.      2.  T Max more than 6 seconds likely representing combination of completed infarct and ischemia = 0 mL.      3.  Mismatched volume likely representing ischemic brain/penumbra = None      4.  Please note that the cerebral perfusion was performed on the limited brain tissue around the basal ganglia region. Infarct/ischemia outside the CT perfusion sections can be missed in this study.      CT-CTA NECK WITH & W/O-POST PROCESSING   Final Result      1. No evidence of flow-limiting stenosis in the cervical carotid or cervical vertebral arteries.      CT-CTA HEAD WITH & W/O-POST PROCESS   Final Result         1. No hemodynamically significant narrowing of the major intracranial vessels.      CT-HEAD W/O   Final Result      No acute intracranial abnormality.        Preliminary recommendations:  - headache cocktail x1: 2g IV Magnesium, 10mg IV Reglan, 25mg IV Benadryl, and Tylenol  - serum acetylcholine receptor Ab and Anti Musk Ab; orders placed  - obtain MRI brain and orbits with and without CST in addition to MRV if symptoms remain refractory    A request was made by ERP for referral to the headache clinic as the patient is amendable to completion of workup with follow up out patient.  Orders placed.  Please page neurohospitalist service to place formal consult if  the patient is agreeable to admission  or if there are further questions.    Osmar Cartwright MD  Neurohospitalist, Acute Care Services   of Neurology

## 2021-01-30 NOTE — ED NOTES
ERP at bedside to discuss findings with patient and re evaluate.   Pt states she feels safe to go home and have MRI done outpatient.

## 2021-01-30 NOTE — ED NOTES
Attempted to round with patient and re-take vitals, unable to find patient in lobby. Several calls made, no answer.

## 2021-01-30 NOTE — ED PROVIDER NOTES
ED Provider Note    Scribed for Arnol Parisi M.D. by Edith Ignacio. 1/29/2021  6:43 PM    Primary care provider: Renate Gr M.D.  Means of arrival: Walk-in  History obtained from: Patient  History limited by: None    CHIEF COMPLAINT  Chief Complaint   Patient presents with   • Facial Droop   • Arm Pain       HPI  Yuki Riddle is a 55 y.o. female who presents to the Emergency Department for evaluation of acute bilateral eye drooping onset at 2 am. Initially she only felt this on her right side but it is now bilateral. It feels as if she is unable to open her eyes. She endorses associated blurred vision, jaw pain, headache, and forehead rash. She was recently diagnosed with trigeminal neuralgia after new onset headaches and facial pain. For about a month she has been having right arm weakness and numbness, though there isn't new or different weakness today. She has had a fever but denies cough, sore throat, shortness of breath, or vomiting. She has not started the steroids she was recently prescribed.    REVIEW OF SYSTEMS  Pertinent positives include: eye drooping, blurred vision, jaw pain, headache, rash, and fever. Pertinent negatives include: no cough, sore throat, or vomiting. See history of present illness. All other systems are negative.     PAST MEDICAL HISTORY   has a past medical history of Arthritis, Cataract, COPD (chronic obstructive pulmonary disease) (HCC), Diabetes, Diabetic neuropathy (HCC), Diabetic retinopathy, Diabetic retinopathy (HCC), Fibromyalgia, Hypertension, Migraine, POTS (postural orthostatic tachycardia syndrome), Recurrent UTI, Rheumatoid arthritis (HCC), Rheumatoid arthritis (HCC), Sleep apnea, and TIA (transient ischemic attack).    SURGICAL HISTORY   has a past surgical history that includes tonsillectomy; cholecystectomy; primary c section; other; foreign body removal (6/18/2013); gastroscopy with balloon dilatation (2/13/2015); and gastroscopy with biopsy  (2/13/2015).    SOCIAL HISTORY  Social History     Tobacco Use   • Smoking status: Current Every Day Smoker     Packs/day: 0.50     Years: 29.00     Pack years: 14.50     Types: Cigarettes   • Smokeless tobacco: Never Used   • Tobacco comment: 15-18 cigarettes a day    Substance Use Topics   • Alcohol use: No   • Drug use: No      Social History     Substance and Sexual Activity   Drug Use No       FAMILY HISTORY  Family History   Problem Relation Age of Onset   • No Known Problems Sister    • No Known Problems Brother    • No Known Problems Brother    • No Known Problems Daughter    • No Known Problems Daughter        CURRENT MEDICATIONS  Current Outpatient Medications   Medication Instructions   • albuterol 108 (90 Base) MCG/ACT Aero Soln inhalation aerosol Inhale 2 puffs by mouth every 4 hours as needed for shortness of breath or wheezing    • albuterol 108 (90 Base) MCG/ACT Aero Soln inhalation aerosol 2 Puffs, Inhalation, EVERY 6 HOURS PRN   • ASPIRIN LOW STRENGTH 81 MG Chew Tab chewable tablet No dose, route, or frequency recorded.   • B-D ULTRAFINE III SHORT PEN USE AS DIRECTED DAILY WITH INSULIN PEN   • BELSOMRA 15 MG Tab TK 1 T PO QD   • calcitRIOL (ROCALTROL) 0.25 mcg, Oral, EVERY 72 HOURS   • carBAMazepine (TEGRETOL) 100 mg, Oral, 3 TIMES DAILY   • cyanocobalamin (VITAMIN B-12) 1000 MCG/ML Solution INJECT 1 ML PER SUBCUTANEOUS ROUTE ONCE A WEEK   • cyclobenzaprine (FLEXERIL) 10 mg, Oral, 3 TIMES DAILY PRN   • diazepam (VALIUM) 10 MG tablet TK 1 T PO  EVERY 6 HOURS   • gabapentin (NEURONTIN) 1,200 mg, Oral, PRN   • HUMALOG KWIKPEN 100 UNIT/ML Solution Pen-injector injection INJECT SC UP TO 30 UNITS PER DAY PER SLIDING SCALE.   • HYDROcodone Bitartrate 50 MG Capsule Extended Release 12 hour Abuse-Deterrent 1 Tab, Oral, EVERY 12 HOURS, For Breakthrough pain   • HYDROcodone/acetaminophen (NORCO)  MG Tab 2 Tabs, Oral, 4 TIMES DAILY   • hydrOXYzine HCl (ATARAX) 25 mg, Oral, 3 TIMES DAILY PRN   • ibuprofen  "(MOTRIN) 600 mg, Oral, EVERY 6 HOURS PRN   • Insulin Glargine (TOUJEO SOLOSTAR) 28 Units, Subcutaneous, EVERY BEDTIME   • lidocaine (XYLOCAINE) 5 % Ointment Apply small amount to painful lesion on scalp up to twice daily as needed   • ONETOUCH ULTRA strip No dose, route, or frequency recorded.   • predniSONE (DELTASONE) 50 mg, Oral, DAILY   • promethazine (PHENERGAN) 25 MG Tab Oral, Take 1 tablet by mouth two times a day   • PROMETHEGAN 25 MG Suppos INSERT 1 SUPPOSITORY RECTALLY PRN NAUSEA       ALLERGIES  Allergies   Allergen Reactions   • Compazine      Aggressive behavior    • Sulfa Drugs Itching     Blotchy spots on chest       PHYSICAL EXAM  VITAL SIGNS: /80   Pulse 100   Temp 36.9 °C (98.4 °F) (Temporal)   Resp 16   Ht 1.651 m (5' 5\")   Wt 69.6 kg (153 lb 7 oz)   LMP 01/01/2002   SpO2 95%   BMI 25.53 kg/m²     Constitutional: Alert in no apparent distress.  HENT: No signs of trauma, Bilateral external ears normal, Nose normal. Uvula midline.   Eyes: Pupils are equal and reactive, Conjunctiva normal, Non-icteric.   Neck: Normal range of motion, No tenderness, Supple, No stridor.   Lymphatic: No lymphadenopathy noted.   Cardiovascular: Regular rate and rhythm, no murmurs.   Thorax & Lungs: Normal breath sounds, No respiratory distress, No wheezing, No chest tenderness.   Abdomen:  Soft, No tenderness, No peritoneal signs, No masses, No pulsatile masses.   Skin: Warm, Dry, No erythema, No rash.   Back: No bony tenderness, No CVA tenderness.   Extremities: Intact distal pulses, No edema, No tenderness, No cyanosis.  Musculoskeletal: Good range of motion in all major joints. No tenderness to palpation or major deformities noted.   Neurologic: Alert, NIH 2, bilateral weakness with opening eyes, Normal motor function, Normal sensory function, No focal deficits noted.   Psychiatric: Affect normal, Judgment normal, Mood normal.       DIAGNOSTIC STUDIES / PROCEDURES    LABS  Labs Reviewed   CBC WITH " DIFFERENTIAL - Abnormal; Notable for the following components:       Result Value    RBC 3.33 (*)     Hemoglobin 10.6 (*)     Hematocrit 31.6 (*)     MCHC 33.5 (*)     Platelet Count 161 (*)     Neutrophils-Polys 78.00 (*)     Lymphocytes 9.10 (*)     Lymphs (Absolute) 0.52 (*)     All other components within normal limits    Narrative:     Indicate which anticoagulants the patient is on:->UNKNOWN   COMP METABOLIC PANEL - Abnormal; Notable for the following components:    Sodium 134 (*)     Glucose 268 (*)     Alkaline Phosphatase 129 (*)     All other components within normal limits    Narrative:     Indicate which anticoagulants the patient is on:->UNKNOWN   ESTIMATED GFR - Abnormal; Notable for the following components:    GFR If Non  55 (*)     All other components within normal limits    Narrative:     Indicate which anticoagulants the patient is on:->UNKNOWN   PROTHROMBIN TIME    Narrative:     Indicate which anticoagulants the patient is on:->UNKNOWN   APTT    Narrative:     Indicate which anticoagulants the patient is on:->UNKNOWN   COD (ADULT)   TROPONIN    Narrative:     Indicate which anticoagulants the patient is on:->UNKNOWN   MISCELLANEOUS TEST   MUSK ANTIBODY      All labs reviewed by me.    EKG  12 Lead EKG interpreted by me to show:  Indication: Arrhythmia   Normal sinus rhythm  Rate 83  Axis: Normal  Intervals: Normal  Normal T waves  Normal ST segments  My impression of this EKG: No STEMI. Unchanged from 5/23/19.    RADIOLOGY  DX-CHEST-PORTABLE (1 VIEW)   Final Result      No acute cardiopulmonary disease.      CT-HEAD WITH   Final Result            No evidence of acute cerebral infarction, hemorrhage, mass lesion, or abnormal enhancement.      If there is concern for cranial nerve abnormality, further evaluation with MRI is recommended.      CT-CEREBRAL PERFUSION ANALYSIS   Final Result      1.  Cerebral blood flow less than 30% likely representing completed infarct = 0 mL.       2.  T Max more than 6 seconds likely representing combination of completed infarct and ischemia = 0 mL.      3.  Mismatched volume likely representing ischemic brain/penumbra = None      4.  Please note that the cerebral perfusion was performed on the limited brain tissue around the basal ganglia region. Infarct/ischemia outside the CT perfusion sections can be missed in this study.      CT-CTA NECK WITH & W/O-POST PROCESSING   Final Result      1. No evidence of flow-limiting stenosis in the cervical carotid or cervical vertebral arteries.      CT-CTA HEAD WITH & W/O-POST PROCESS   Final Result         1. No hemodynamically significant narrowing of the major intracranial vessels.      CT-HEAD W/O   Final Result      No acute intracranial abnormality.      MR-BRAIN-WITH & W/O    (Results Pending)   MR-ORBITS,FACE,NECK-WITH&W/O & SEQUENCES    (Results Pending)   MR-VENOGRAM (MRV) HEAD    (Results Pending)     The radiologist's interpretation of all radiological studies have been reviewed by me.    COURSE & MEDICAL DECISION MAKING  Nursing notes, VS, PMSFHx reviewed in chart.    55 y.o. female p/w chief complaint of eye droop and headache.    6:43 PM Patient seen and examined at bedside. Stroke IR activated.     I verified that the patient was wearing a mask and I was wearing appropriate PPE every time I entered the room. The patient's mask was on the patient at all times during my encounter except for a brief view of the oropharynx.     The differential diagnoses include but are not limited to:   Pt presents w/ hx and PE concerning for atypical migraine vs CVA  Sx improvement upon migraine cocktail.   NIH zero upon d/c  Pt will initiate daily ASA   No e/o significant electrolye abnormality as etiology of sx  No hx to suggest traumatic etiology  No hx or PE to suggest cord or nerve impingement  Plan for outpt f/u w/ neurology and PCP in next few days    Intravenous fluids administered for headache.  Patient not  "appropriate for oral rehydration, as surgical process needs to be ruled out before trial of oral rehydration.   On repeat evaluation, improved.  Pt w/ positive fluid response.    9:50 PM Paged Neurology.    9:58 PM I discussed the patient's case and the above findings with Dr. Cartwright (Neurology) who recommends outpatient MR-brain orbits with and without.     10:00 PM Patient was reevaluated at bedside. She reports that her eyelids are \"hanging down\" and this is not normal for her. Her vision has resolved at this time. EOMI, no appreciable facial weakness. Informed patient that a small CVA is not able to be ruled out at this time and she will need outpatient MRI. Discussed discharge instructions and return precautions with the patient and they were cleared for discharge. Patient was given the opportunity to ask any further questions. She is comfortable with discharge at this time.      The patient will return for new or worsening symptoms and is stable at the time of discharge.    The patient is referred to a primary physician for blood pressure management, diabetic screening, and for all other preventative health concerns.    DISPOSITION:  Patient will be discharged home in stable condition.    FOLLOW UP:  Renate Gr M.D.  [address]  [address]  [facility] [address] [phone]    In 3 days      Osmar Cartwright M.D.  [address] [address] [phone]      Please call to schedule neurology follow up appointment    [facility], Emergency Dept  [address] [facility] [address] [phone]    If symptoms worsen or return      OUTPATIENT MEDICATIONS:  New Prescriptions    No medications on file       FINAL IMPRESSION  1. Blurred vision    2. Facial droop    3. Acute non intractable tension-type headache          I, Edith Ignacio (Scribevangelist), am scribing for, and in the presence of, Arnol Parisi M.D..    Electronically signed by: Edith Ignacio (Scribe), " 1/29/2021    IArnol M.D. personally performed the services described in this documentation, as scribed by Edith Ignacio in my presence, and it is both accurate and complete.    The note accurately reflects work and decisions made by me.  Arnol Parisi M.D.  1/30/2021  3:19 AM

## 2021-01-30 NOTE — DISCHARGE INSTRUCTIONS
Please discuss the following findings with your regular doctor:  Labs Reviewed   CBC WITH DIFFERENTIAL - Abnormal; Notable for the following components:       Result Value    RBC 3.33 (*)     Hemoglobin 10.6 (*)     Hematocrit 31.6 (*)     MCHC 33.5 (*)     Platelet Count 161 (*)     Neutrophils-Polys 78.00 (*)     Lymphocytes 9.10 (*)     Lymphs (Absolute) 0.52 (*)     All other components within normal limits    Narrative:     Indicate which anticoagulants the patient is on:->UNKNOWN   COMP METABOLIC PANEL - Abnormal; Notable for the following components:    Sodium 134 (*)     Glucose 268 (*)     Alkaline Phosphatase 129 (*)     All other components within normal limits    Narrative:     Indicate which anticoagulants the patient is on:->UNKNOWN   ESTIMATED GFR - Abnormal; Notable for the following components:    GFR If Non  55 (*)     All other components within normal limits    Narrative:     Indicate which anticoagulants the patient is on:->UNKNOWN   PROTHROMBIN TIME    Narrative:     Indicate which anticoagulants the patient is on:->UNKNOWN   APTT    Narrative:     Indicate which anticoagulants the patient is on:->UNKNOWN   COD (ADULT)   TROPONIN    Narrative:     Indicate which anticoagulants the patient is on:->UNKNOWN   MISCELLANEOUS TEST   MUSK ANTIBODY      DX-CHEST-PORTABLE (1 VIEW)   Final Result      No acute cardiopulmonary disease.      CT-HEAD WITH   Final Result            No evidence of acute cerebral infarction, hemorrhage, mass lesion, or abnormal enhancement.      If there is concern for cranial nerve abnormality, further evaluation with MRI is recommended.      CT-CEREBRAL PERFUSION ANALYSIS   Final Result      1.  Cerebral blood flow less than 30% likely representing completed infarct = 0 mL.      2.  T Max more than 6 seconds likely representing combination of completed infarct and ischemia = 0 mL.      3.  Mismatched volume likely representing ischemic brain/penumbra = None       4.  Please note that the cerebral perfusion was performed on the limited brain tissue around the basal ganglia region. Infarct/ischemia outside the CT perfusion sections can be missed in this study.      CT-CTA NECK WITH & W/O-POST PROCESSING   Final Result      1. No evidence of flow-limiting stenosis in the cervical carotid or cervical vertebral arteries.      CT-CTA HEAD WITH & W/O-POST PROCESS   Final Result         1. No hemodynamically significant narrowing of the major intracranial vessels.      CT-HEAD W/O   Final Result      No acute intracranial abnormality.      MR-BRAIN-WITH & W/O    (Results Pending)   MR-ORBITS,FACE,NECK-WITH&W/O & SEQUENCES    (Results Pending)   MR-VENOGRAM (MRV) HEAD    (Results Pending)

## 2021-02-02 LAB — MUSK AB SER-SCNC: 0 NMOL/L (ref 0–0.03)

## 2021-02-04 LAB
ACHR BIND AB SER-SCNC: 0 NMOL/L (ref 0–0.4)
ACHR BLOCK AB/ACHR TOTAL SFR SER: 5 % (ref 0–26)

## 2021-04-25 ENCOUNTER — HOSPITAL ENCOUNTER (EMERGENCY)
Dept: HOSPITAL 8 - ED | Age: 56
LOS: 1 days | Discharge: HOME | End: 2021-04-26
Payer: MEDICAID

## 2021-04-25 VITALS — DIASTOLIC BLOOD PRESSURE: 78 MMHG | SYSTOLIC BLOOD PRESSURE: 119 MMHG

## 2021-04-25 VITALS — HEIGHT: 65 IN | WEIGHT: 156.53 LBS | BODY MASS INDEX: 26.08 KG/M2

## 2021-04-25 DIAGNOSIS — E11.65: ICD-10-CM

## 2021-04-25 DIAGNOSIS — Z90.49: ICD-10-CM

## 2021-04-25 DIAGNOSIS — Z87.891: ICD-10-CM

## 2021-04-25 DIAGNOSIS — R11.0: ICD-10-CM

## 2021-04-25 DIAGNOSIS — R10.32: Primary | ICD-10-CM

## 2021-04-25 LAB
ALBUMIN SERPL-MCNC: 3.3 G/DL (ref 3.4–5)
ALP SERPL-CCNC: 169 U/L (ref 45–117)
ALT SERPL-CCNC: 40 U/L (ref 12–78)
ANION GAP SERPL CALC-SCNC: 3 MMOL/L (ref 5–15)
BASOPHILS # BLD AUTO: 0 X10^3/UL (ref 0–0.1)
BASOPHILS NFR BLD AUTO: 1 % (ref 0–1)
BILIRUB SERPL-MCNC: 0.3 MG/DL (ref 0.2–1)
CALCIUM SERPL-MCNC: 8.6 MG/DL (ref 8.5–10.1)
CHLORIDE SERPL-SCNC: 102 MMOL/L (ref 98–107)
CREAT SERPL-MCNC: 1.05 MG/DL (ref 0.55–1.02)
EOSINOPHIL # BLD AUTO: 0.2 X10^3/UL (ref 0–0.4)
EOSINOPHIL NFR BLD AUTO: 4 % (ref 1–7)
ERYTHROCYTE [DISTWIDTH] IN BLOOD BY AUTOMATED COUNT: 14.1 % (ref 9.6–15.2)
LYMPHOCYTES # BLD AUTO: 1.3 X10^3/UL (ref 1–3.4)
LYMPHOCYTES NFR BLD AUTO: 29 % (ref 22–44)
MCH RBC QN AUTO: 32.6 PG (ref 27–34.8)
MCHC RBC AUTO-ENTMCNC: 34.8 G/DL (ref 32.4–35.8)
MD: NO
MONOCYTES # BLD AUTO: 0.4 X10^3/UL (ref 0.2–0.8)
MONOCYTES NFR BLD AUTO: 9 % (ref 2–9)
NEUTROPHILS # BLD AUTO: 2.6 X10^3/UL (ref 1.8–6.8)
NEUTROPHILS NFR BLD AUTO: 56 % (ref 42–75)
PLATELET # BLD AUTO: 169 X10^3/UL (ref 130–400)
PMV BLD AUTO: 9.6 FL (ref 7.4–10.4)
PROT SERPL-MCNC: 6.3 G/DL (ref 6.4–8.2)
RBC # BLD AUTO: 3.37 X10^6/UL (ref 3.82–5.3)

## 2021-04-25 PROCEDURE — 36415 COLL VENOUS BLD VENIPUNCTURE: CPT

## 2021-04-25 PROCEDURE — 85025 COMPLETE CBC W/AUTO DIFF WBC: CPT

## 2021-04-25 PROCEDURE — 83690 ASSAY OF LIPASE: CPT

## 2021-04-25 PROCEDURE — 96374 THER/PROPH/DIAG INJ IV PUSH: CPT

## 2021-04-25 PROCEDURE — 99285 EMERGENCY DEPT VISIT HI MDM: CPT

## 2021-04-25 PROCEDURE — 87086 URINE CULTURE/COLONY COUNT: CPT

## 2021-04-25 PROCEDURE — 96375 TX/PRO/DX INJ NEW DRUG ADDON: CPT

## 2021-04-25 PROCEDURE — 96361 HYDRATE IV INFUSION ADD-ON: CPT

## 2021-04-25 PROCEDURE — 81001 URINALYSIS AUTO W/SCOPE: CPT

## 2021-04-25 PROCEDURE — 74177 CT ABD & PELVIS W/CONTRAST: CPT

## 2021-04-25 PROCEDURE — 80053 COMPREHEN METABOLIC PANEL: CPT

## 2021-04-25 NOTE — NUR
PATIENT PULLED IV OUT WHILE RESTING IN BED. WILL REPLACE.  WALKED HER TO 
BATHROOM, URINE SAMPLE OBTAINED.

## 2021-04-25 NOTE — NUR
PATIENT ARRIVES WITH LEFT LOWER QUADRANT ABDOMINAL PAIN FOR TWO DAYS.  SHE HAS 
SOME NAUSEA, NO VOMITING.

## 2021-04-26 LAB — MICROSCOPIC: (no result)

## 2021-04-26 NOTE — NUR
PATIENT FIGETY AND GETS UP TO GO PEE, LOOK FOR GLASSES, GIVE HERSELF INSULIN; 
SHE JUST INFORMED ME THAT SHE GAVE HERSELF INSULIN HER NIGHTLY DOSE AND NOW 
FEELS HYPOGLYCEMIC.  WILL GET HER A SANDWICH AND LET MD KNOW. PATIENT RESTLESS.

## 2021-11-07 ENCOUNTER — HOSPITAL ENCOUNTER (EMERGENCY)
Facility: MEDICAL CENTER | Age: 56
End: 2021-11-07
Attending: EMERGENCY MEDICINE
Payer: MEDICAID

## 2021-11-07 VITALS
DIASTOLIC BLOOD PRESSURE: 58 MMHG | HEIGHT: 65 IN | WEIGHT: 145 LBS | BODY MASS INDEX: 24.16 KG/M2 | SYSTOLIC BLOOD PRESSURE: 110 MMHG | RESPIRATION RATE: 16 BRPM | HEART RATE: 91 BPM | TEMPERATURE: 99.1 F | OXYGEN SATURATION: 93 %

## 2021-11-07 DIAGNOSIS — U07.1 COVID-19 VIRUS INFECTION: ICD-10-CM

## 2021-11-07 DIAGNOSIS — E10.69 TYPE 1 DIABETES MELLITUS WITH OTHER SPECIFIED COMPLICATION (HCC): ICD-10-CM

## 2021-11-07 PROCEDURE — 99283 EMERGENCY DEPT VISIT LOW MDM: CPT

## 2021-11-07 PROCEDURE — 96372 THER/PROPH/DIAG INJ SC/IM: CPT

## 2021-11-07 PROCEDURE — 700102 HCHG RX REV CODE 250 W/ 637 OVERRIDE(OP): Performed by: EMERGENCY MEDICINE

## 2021-11-07 PROCEDURE — A9270 NON-COVERED ITEM OR SERVICE: HCPCS | Performed by: EMERGENCY MEDICINE

## 2021-11-07 PROCEDURE — 700111 HCHG RX REV CODE 636 W/ 250 OVERRIDE (IP): Performed by: EMERGENCY MEDICINE

## 2021-11-07 PROCEDURE — M0243 CASIRIVI AND IMDEVI INFUSION: HCPCS

## 2021-11-07 RX ORDER — ACETAMINOPHEN 325 MG/1
650 TABLET ORAL ONCE
Status: COMPLETED | OUTPATIENT
Start: 2021-11-07 | End: 2021-11-07

## 2021-11-07 RX ORDER — ALBUTEROL SULFATE 90 UG/1
2 AEROSOL, METERED RESPIRATORY (INHALATION) EVERY 6 HOURS PRN
Qty: 8.5 G | Refills: 0 | Status: SHIPPED | OUTPATIENT
Start: 2021-11-07 | End: 2022-08-18

## 2021-11-07 RX ORDER — DEXAMETHASONE 2 MG/1
6 TABLET ORAL 2 TIMES DAILY
Qty: 60 TABLET | Refills: 0 | Status: SHIPPED | OUTPATIENT
Start: 2021-11-07 | End: 2021-11-17

## 2021-11-07 RX ORDER — IBUPROFEN 600 MG/1
600 TABLET ORAL ONCE
Status: COMPLETED | OUTPATIENT
Start: 2021-11-07 | End: 2021-11-07

## 2021-11-07 RX ORDER — BENZONATATE 100 MG/1
100 CAPSULE ORAL 3 TIMES DAILY PRN
Qty: 30 CAPSULE | Refills: 0 | Status: SHIPPED | OUTPATIENT
Start: 2021-11-07 | End: 2022-08-18

## 2021-11-07 RX ORDER — DEXAMETHASONE SODIUM PHOSPHATE 4 MG/ML
6 INJECTION, SOLUTION INTRA-ARTICULAR; INTRALESIONAL; INTRAMUSCULAR; INTRAVENOUS; SOFT TISSUE ONCE
Status: COMPLETED | OUTPATIENT
Start: 2021-11-07 | End: 2021-11-07

## 2021-11-07 RX ADMIN — DEXAMETHASONE SODIUM PHOSPHATE 6 MG: 4 INJECTION, SOLUTION INTRA-ARTICULAR; INTRALESIONAL; INTRAMUSCULAR; INTRAVENOUS; SOFT TISSUE at 20:03

## 2021-11-07 RX ADMIN — ACETAMINOPHEN 650 MG: 325 TABLET ORAL at 19:51

## 2021-11-07 RX ADMIN — CASIRIVIMAB AND IMDEVIMAB 300 MG: 600; 600 INJECTION, SOLUTION, CONCENTRATE INTRAVENOUS at 20:00

## 2021-11-07 RX ADMIN — IBUPROFEN 600 MG: 600 TABLET ORAL at 19:50

## 2021-11-07 ASSESSMENT — FIBROSIS 4 INDEX: FIB4 SCORE: 1.8

## 2021-11-07 ASSESSMENT — PAIN DESCRIPTION - PAIN TYPE: TYPE: ACUTE PAIN

## 2021-11-08 NOTE — ED NOTES
Pt medicated per MAR. Baseline vitals obtained immediately prior to Regen-COV injections. Pt now resting in bed, NADN, VSS, call button in reach.

## 2021-11-08 NOTE — ED NOTES
Discharge teaching with paperwork regarding COVID and new prescriptions provided to pt. Pt verbalized understanding of teaching and all questions answered. Pt is A&Ox4 with stable vital  Signs and stable physical assessment upon discharge. Pt ambulatory out of ED with steady gait with all personal belongings.

## 2021-11-08 NOTE — ED NOTES
Pt resting in bed, VSS, no signs of adverse or allergic reaction to Regen -COV noted 1 hour post administration.

## 2021-11-08 NOTE — ED NOTES
Pt from the lobby to purple 71 with steady gait. Pt changed into gown and placed on continuous cardiac, BP and O2 monitors. Initial assessment complete.

## 2021-11-08 NOTE — ED TRIAGE NOTES
Chief Complaint   Patient presents with   • Coronavirus Screening     tested positive 3 days ago, Pt reports begining to feel worse 2 days ago.      Pt to triage via wheelchair for above complaint. Pt reports body aches, chills, fevers and cough. VSS.

## 2021-11-08 NOTE — ED PROVIDER NOTES
"ED Provider Note    CHIEF COMPLAINT  Chief Complaint   Patient presents with   • Coronavirus Screening     tested positive 3 days ago, Pt reports begining to feel worse 2 days ago.        HPI  Yuki Riddle is a 56 y.o. female with a history of diabetes and who is unvaccinated against coronavirus states that both of her parents got coronavirus infection and ultimate only her mother was admitted to the hospital and she tested +3 days ago but is feeling worse for the last 2 days with episodic fever and even though she is normoxic here she says that sometimes she goes down to 88% when she is at home sleeping.  She does have diabetes that is type I and did not get vaccinated \"for my own reasons \"    REVIEW OF SYSTEMS  See HPI for further details. All other systems are negative.     PAST MEDICAL HISTORY  Past Medical History:   Diagnosis Date   • Arthritis    • Cataract    • COPD (chronic obstructive pulmonary disease) (HCC)    • Diabetes     juvenile, type I   • Diabetic neuropathy (HCC)    • Diabetic retinopathy    • Diabetic retinopathy (HCC)    • Fibromyalgia    • Hypertension    • Migraine    • POTS (postural orthostatic tachycardia syndrome)     treated with hydrocodone   • Recurrent UTI    • Rheumatoid arthritis (HCC)    • Rheumatoid arthritis (HCC)    • Sleep apnea     cpap ordered, doesn't use   • TIA (transient ischemic attack)        FAMILY HISTORY  Family History   Problem Relation Age of Onset   • No Known Problems Sister    • No Known Problems Brother    • No Known Problems Brother    • No Known Problems Daughter    • No Known Problems Daughter        SOCIAL HISTORY   reports that she has been smoking cigarettes. She has a 14.50 pack-year smoking history. She has never used smokeless tobacco. She reports that she does not drink alcohol and does not use drugs.    SURGICAL HISTORY  Past Surgical History:   Procedure Laterality Date   • GASTROSCOPY WITH BALLOON DILATATION  2/13/2015    Performed by " "Venancio Aburto M.D. at SURGERY [facility] ORS   • GASTROSCOPY WITH BIOPSY  2/13/2015    Performed by Venancio Aburto M.D. at SURGERY [facility]   • FOREIGN BODY REMOVAL  6/18/2013    Performed by Raz Mari M.D. at SURGERY SAME DAY [facility] ORS   • CHOLECYSTECTOMY     • OTHER      Skin disorder of unknown name   • PRIMARY C SECTION     • TONSILLECTOMY         CURRENT MEDICATIONS  Home Medications     Reviewed by Cristobal Lo R.N. (Registered Nurse) on 11/07/21 at 1812  Med List Status: Not Addressed   Medication Last Dose Status   albuterol 108 (90 Base) MCG/ACT Aero Soln inhalation aerosol  Active   albuterol 108 (90 Base) MCG/ACT Aero Soln inhalation aerosol  Active   ASPIRIN LOW STRENGTH 81 MG Chew Tab chewable tablet  Active   B-D ULTRAFINE III SHORT PEN  Active   BELSOMRA 15 MG Tab  Active   calcitRIOL (ROCALTROL) 0.25 MCG Cap  Active   carBAMazepine (TEGRETOL) 100 MG Chew Tab  Active   cyanocobalamin (VITAMIN B-12) 1000 MCG/ML Solution  Active   cyclobenzaprine (FLEXERIL) 10 MG Tab  Active   diazepam (VALIUM) 10 MG tablet  Active   gabapentin (NEURONTIN) 600 MG tablet  Active   HUMALOG KWIKPEN 100 UNIT/ML Solution Pen-injector injection  Active   HYDROcodone Bitartrate 50 MG Capsule Extended Release 12 hour Abuse-Deterrent  Active   HYDROcodone/acetaminophen (NORCO)  MG Tab  Active   hydrOXYzine HCl (ATARAX) 25 MG Tab  Active   ibuprofen (MOTRIN) 600 MG Tab  Active   Insulin Glargine (TOUJEO SOLOSTAR) 300 UNIT/ML Solution Pen-injector  Active   lidocaine (XYLOCAINE) 5 % Ointment  Active   ONETOUCH ULTRA strip  Active   promethazine (PHENERGAN) 25 MG Tab  Active   PROMETHEGAN 25 MG Suppos  Active                ALLERGIES  Allergies   Allergen Reactions   • Compazine      Aggressive behavior    • Sulfa Drugs Itching     Blotchy spots on chest       PHYSICAL EXAM  VITAL SIGNS: /83   Pulse 95   Temp 36.4 °C (97.6 °F) (Temporal)   Resp 16   Ht 1.651 m (5' 5\")   Wt " 65.8 kg (145 lb)   LMP 01/01/2002   SpO2 96%   BMI 24.13 kg/m²    Constitutional: Well developed, Well nourished, mild respiratory distress, ill appearance.   HENT: Normocephalic, Atraumatic, Bilateral external ears normal, Oropharynx is clear mucous membranes are moist. No oral exudates or nasal discharge.   Eyes: Pupils are equal round and reactive, EOMI, Conjunctiva normal, No discharge.   Neck: Normal range of motion, No tenderness, Supple, No stridor. No meningismus.  Lymphatic: No lymphadenopathy noted.   Cardiovascular: Regular rate and rhythm without murmur rub or gallop.  Thorax & Lungs: Bilateral rhonchi with mild tachypnea. There is no chest wall tenderness.   Abdomen: Soft non-tender non-distended. There is no rebound or guarding. No organomegaly is appreciated. Bowel sounds are normal.  Skin: Normal without rash.  Hyperesthesia of skin of the lower extremities bilaterally  Back: No CVA or spinal tenderness.   Extremities: Intact distal pulses, No edema, No tenderness, No cyanosis, No clubbing. Capillary refill is less than 2 seconds.  Musculoskeletal: Good range of motion in all major joints. No tenderness to palpation or major deformities noted.   Neurologic: Alert & oriented x 3, Normal motor function, Normal sensory function, No focal deficits noted. Reflexes are normal.  Psychiatric: Affect normal, Judgment normal, Mood normal. There is no suicidal ideation or patient reported hallucinations.       COURSE & MEDICAL DECISION MAKING  Pertinent Labs & Imaging studies reviewed. (See chart for details)  The patient is normoxic here and work of breathing is minimal and she will not need hospitalization however I believe she is a good candidate for Regeneron.    I gave her the literature on Regeneron and she agrees to risks and understands benefits.  She understands that this is not fully approved by the FDA and wants to have the medication in spite of this    No labs were ordered given that she will  not require admission.  She was given Regeneron therapy under pharmacy supervision as well as mine and was ultimately observed for 1 hour.  Regeneron was administered at 1930 hrs.    The patient will ultimately be discharged after interventions and she will need to be observed until 2030 hrs. for any kind of reaction.  Thereafter she is encouraged to use Tessalon Perles for cough and expectoration and I am placing her on Decadron 6 mg/day orally for 10 days as well as use of inhaler.  She understands that given her diabetes she will likely need to adjust her insulin upward and she is comfortable with this plan    FINAL IMPRESSION  1. COVID-19 virus infection    2. Type 1 diabetes mellitus with other specified complication (HCC)             Electronically signed by: Chas Randhawa M.D., 11/7/2021 7:13 PM

## 2021-12-15 ENCOUNTER — TELEPHONE (OUTPATIENT)
Dept: MEDICAL GROUP | Facility: MEDICAL CENTER | Age: 56
End: 2021-12-15

## 2022-01-07 ENCOUNTER — OFFICE VISIT (OUTPATIENT)
Dept: URGENT CARE | Facility: PHYSICIAN GROUP | Age: 57
End: 2022-01-07
Payer: MEDICAID

## 2022-01-07 VITALS
WEIGHT: 155 LBS | RESPIRATION RATE: 12 BRPM | HEIGHT: 63 IN | TEMPERATURE: 97.2 F | OXYGEN SATURATION: 95 % | SYSTOLIC BLOOD PRESSURE: 168 MMHG | DIASTOLIC BLOOD PRESSURE: 88 MMHG | HEART RATE: 86 BPM | BODY MASS INDEX: 27.46 KG/M2

## 2022-01-07 DIAGNOSIS — H61.21 RIGHT EAR IMPACTED CERUMEN: ICD-10-CM

## 2022-01-07 DIAGNOSIS — I15.9 SECONDARY HYPERTENSION: ICD-10-CM

## 2022-01-07 PROCEDURE — 99213 OFFICE O/P EST LOW 20 MIN: CPT | Mod: 25 | Performed by: STUDENT IN AN ORGANIZED HEALTH CARE EDUCATION/TRAINING PROGRAM

## 2022-01-07 ASSESSMENT — FIBROSIS 4 INDEX: FIB4 SCORE: 1.8

## 2022-01-07 NOTE — PROGRESS NOTES
Subjective:   CHIEF COMPLAINT  Chief Complaint   Patient presents with   • Ear Pain   • Dizziness       [facility]  Yuki Riddle is a 56 y.o. female who presents with a chief complaint of loss of hearing from her right ear associated with mild discomfort for approximately 3.5 weeks.  Patient has tried Debrox lotion and a flush kit which have not helped.  Says there has been no drainage or discharge.    REVIEW OF SYSTEMS  General: no fever or chills  GI: no nausea or vomiting  See HPI for further details.    PAST MEDICAL HISTORY  Patient Active Problem List    Diagnosis Date Noted   • Pre-ulcerative calluses 11/11/2020   • Acute bilateral low back pain without sciatica 11/11/2020   • Scalp lesion 04/03/2019   • Neurodermatitis 04/03/2019   • GERD (gastroesophageal reflux disease) 02/01/2019   • Cyst of skin of right breast 02/01/2019   • Nocturnal hypoxia 01/25/2019   • Tobacco abuse counseling 01/25/2019   • Abnormal CAT scan 06/21/2018   • Muscle spasm 05/24/2018   • POTS (postural orthostatic tachycardia syndrome) 05/24/2018   • History of TIA (transient ischemic attack) 05/24/2018   • Recurrent UTI 05/23/2018   • Ovarian cyst 05/23/2018   • Drug-induced constipation 05/23/2018   • Insomnia 05/23/2018   • Anxiety 05/23/2018   • Rheumatoid arthritis ([facility]) 05/23/2018   • Chronic pain of both knees 05/23/2018   • Type 1 diabetes mellitus with complication ([facility]) 11/27/2017   • Benzodiazepine dependence ([facility]) 11/27/2017   • Painful diabetic neuropathy ([facility]) 08/02/2017   • COPD (chronic obstructive pulmonary disease) ([facility]) 01/05/2017   • Opiate dependence (CMS-[facility]) 11/17/2013   • Tobacco dependence 08/26/2013   • Fibromyalgia 08/23/2013       SURGICAL HISTORY   has a past surgical history that includes tonsillectomy; cholecystectomy; primary c section; other; foreign body removal (6/18/2013); gastroscopy with balloon dilatation (2/13/2015); and gastroscopy with biopsy (2/13/2015).    ALLERGIES  Allergies   Allergen  Reactions   • Compazine      Aggressive behavior    • Sulfa Drugs Itching     Blotchy spots on chest       CURRENT MEDICATIONS  Home Medications     Reviewed by Olga Salazar Med Ass't (Medical Assistant) on 01/07/22 at 1411  Med List Status: <None>   Medication Last Dose Status   albuterol 108 (90 Base) MCG/ACT Aero Soln inhalation aerosol PRN Active   albuterol 108 (90 Base) MCG/ACT Aero Soln inhalation aerosol  Active   albuterol 108 (90 Base) MCG/ACT Aero Soln inhalation aerosol  Active   ASPIRIN LOW STRENGTH 81 MG Chew Tab chewable tablet  Active   B-D ULTRAFINE III SHORT PEN Taking Active   BELSOMRA 15 MG Tab Not Taking Active   benzonatate (TESSALON) 100 MG Cap  Active   calcitRIOL (ROCALTROL) 0.25 MCG Cap Not Taking Active   carBAMazepine (TEGRETOL) 100 MG Chew Tab  Active   cyanocobalamin (VITAMIN B-12) 1000 MCG/ML Solution Not Taking Active   cyclobenzaprine (FLEXERIL) 10 MG Tab Taking Active   diazepam (VALIUM) 10 MG tablet Taking Active   gabapentin (NEURONTIN) 600 MG tablet Taking Active   HUMALOG KWIKPEN 100 UNIT/ML Solution Pen-injector injection Taking Active   HYDROcodone Bitartrate 50 MG Capsule Extended Release 12 hour Abuse-Deterrent Taking Active   HYDROcodone/acetaminophen (NORCO)  MG Tab Taking Active   hydrOXYzine HCl (ATARAX) 25 MG Tab PRN Active   ibuprofen (MOTRIN) 600 MG Tab  Active   Insulin Glargine (TOUJEO SOLOSTAR) 300 UNIT/ML Solution Pen-injector  Active   lidocaine (XYLOCAINE) 5 % Ointment PRN Active   ONETOUCH ULTRA strip  Active   promethazine (PHENERGAN) 25 MG Tab  Active   PROMETHEGAN 25 MG Suppos Taking Active                SOCIAL HISTORY  Social History     Tobacco Use   • Smoking status: Current Every Day Smoker     Packs/day: 0.50     Years: 29.00     Pack years: 14.50     Types: Cigarettes   • Smokeless tobacco: Never Used   • Tobacco comment: 15-18 cigarettes a day    Substance and Sexual Activity   • Alcohol use: No   • Drug use: No   • Sexual activity: Not on  "file       FAMILY HISTORY  Family History   Problem Relation Age of Onset   • No Known Problems Sister    • No Known Problems Brother    • No Known Problems Brother    • No Known Problems Daughter    • No Known Problems Daughter           Objective:   PHYSICAL EXAM  VITAL SIGNS: BP (!) 168/88   Pulse 86   Temp 36.2 °C (97.2 °F)   Resp 12   Ht 1.6 m (5' 3\")   Wt 70.3 kg (155 lb)   LMP 01/01/2002   SpO2 95%   BMI 27.46 kg/m²     Gen: no acute distress, normal voice  Skin: dry, intact, moist mucosal membranes  ENT: Right ear infection.  Left TM clear intact without bulging, erythema or effusion.  Lungs: CTAB w/ symmetric expansion  CV: RRR w/o murmurs or clicks  Psych: normal affect, normal judgement, alert, awake    Assessment/Plan:     1. Right ear impacted cerumen     2. Secondary hypertension     1)On  examination patient demonstrated right cerumen impaction.  She has failed home therapy so manual lavage was performed successfully removing cerumen.  On repeat examination she demonstrated a serous effusion but TM was intact without any evidence of a middle ear infection.  She was instructed to try using Flonase and Zyrtec.  If symptoms worsen or do not improve follow-up in urgent care versus primary care as needed    2) blood pressure currently 168/80.  Patient says she has a history of orthostatic hypotension does not tolerate p.o. antihypertensive medications.  She was instructed to follow-up with primary care for continued management.    Differential diagnosis, natural history, supportive care, and indications for immediate follow-up discussed. All questions answered. Patient agrees with the plan of care.    Follow-up as needed if symptoms worsen or fail to improve to PCP, Urgent care or Emergency Room.    Please note that this dictation was created using voice recognition software. I have made a reasonable attempt to correct obvious errors, but I expect that there are errors of grammar and possibly " content that I did not discover before finalizing the note.

## 2022-08-18 ENCOUNTER — OFFICE VISIT (OUTPATIENT)
Dept: MEDICAL GROUP | Facility: MEDICAL CENTER | Age: 57
End: 2022-08-18
Attending: INTERNAL MEDICINE
Payer: MEDICAID

## 2022-08-18 VITALS
RESPIRATION RATE: 16 BRPM | HEART RATE: 102 BPM | BODY MASS INDEX: 27.82 KG/M2 | HEIGHT: 65 IN | TEMPERATURE: 97.9 F | DIASTOLIC BLOOD PRESSURE: 100 MMHG | OXYGEN SATURATION: 94 % | WEIGHT: 167 LBS | SYSTOLIC BLOOD PRESSURE: 160 MMHG

## 2022-08-18 DIAGNOSIS — Z12.31 SCREENING MAMMOGRAM, ENCOUNTER FOR: ICD-10-CM

## 2022-08-18 DIAGNOSIS — K31.84 DIABETIC GASTROPARESIS ASSOCIATED WITH TYPE 1 DIABETES MELLITUS (HCC): ICD-10-CM

## 2022-08-18 DIAGNOSIS — R14.2 BURPING: ICD-10-CM

## 2022-08-18 DIAGNOSIS — L65.9 HAIR LOSS: ICD-10-CM

## 2022-08-18 DIAGNOSIS — R03.0 ELEVATED BLOOD PRESSURE READING: ICD-10-CM

## 2022-08-18 DIAGNOSIS — K21.9 GASTROESOPHAGEAL REFLUX DISEASE, UNSPECIFIED WHETHER ESOPHAGITIS PRESENT: ICD-10-CM

## 2022-08-18 DIAGNOSIS — H91.91 HEARING LOSS OF RIGHT EAR, UNSPECIFIED HEARING LOSS TYPE: ICD-10-CM

## 2022-08-18 DIAGNOSIS — E10.43 DIABETIC GASTROPARESIS ASSOCIATED WITH TYPE 1 DIABETES MELLITUS (HCC): ICD-10-CM

## 2022-08-18 DIAGNOSIS — E10.8 TYPE 1 DIABETES MELLITUS WITH COMPLICATION (HCC): ICD-10-CM

## 2022-08-18 DIAGNOSIS — M25.511 CHRONIC RIGHT SHOULDER PAIN: ICD-10-CM

## 2022-08-18 DIAGNOSIS — G89.29 CHRONIC RIGHT SHOULDER PAIN: ICD-10-CM

## 2022-08-18 DIAGNOSIS — E11.40 PAINFUL DIABETIC NEUROPATHY (HCC): ICD-10-CM

## 2022-08-18 PROBLEM — H91.90 HEARING LOSS: Status: ACTIVE | Noted: 2022-08-18

## 2022-08-18 PROBLEM — Z71.6 TOBACCO ABUSE COUNSELING: Status: RESOLVED | Noted: 2019-01-25 | Resolved: 2022-08-18

## 2022-08-18 PROCEDURE — 99214 OFFICE O/P EST MOD 30 MIN: CPT | Performed by: INTERNAL MEDICINE

## 2022-08-18 RX ORDER — BUPROPION HYDROCHLORIDE 150 MG/1
150 TABLET, EXTENDED RELEASE ORAL 2 TIMES DAILY
COMMUNITY
Start: 2022-08-15

## 2022-08-18 RX ORDER — NITROFURANTOIN 25; 75 MG/1; MG/1
100 CAPSULE ORAL 2 TIMES DAILY
COMMUNITY
Start: 2022-08-02 | End: 2022-08-18

## 2022-08-18 RX ORDER — INSULIN GLARGINE 300 U/ML
27 INJECTION, SOLUTION SUBCUTANEOUS
COMMUNITY
Start: 2022-08-15

## 2022-08-18 RX ORDER — PHENTERMINE HYDROCHLORIDE 15 MG/1
15 CAPSULE ORAL EVERY MORNING
COMMUNITY
Start: 2022-07-15 | End: 2022-08-18

## 2022-08-18 RX ORDER — PHENAZOPYRIDINE 100 MG/1
100 TABLET, FILM COATED ORAL 3 TIMES DAILY
COMMUNITY
Start: 2022-08-02 | End: 2022-08-18

## 2022-08-18 RX ORDER — OXYCODONE 36 MG/1
CAPSULE, EXTENDED RELEASE ORAL 2 TIMES DAILY
Status: ON HOLD | COMMUNITY
Start: 2022-07-26 | End: 2023-10-01

## 2022-08-18 RX ORDER — AMOXICILLIN AND CLAVULANATE POTASSIUM 875; 125 MG/1; MG/1
1 TABLET, FILM COATED ORAL 2 TIMES DAILY
COMMUNITY
Start: 2022-07-13 | End: 2022-08-18

## 2022-08-18 ASSESSMENT — FIBROSIS 4 INDEX: FIB4 SCORE: 1.75

## 2022-08-18 NOTE — ASSESSMENT & PLAN NOTE
She states that approximately 1 month after vi COVID, she woke up 1 day and could not hear out of the right ear.  States she has continued to wait for her hearing to return but it has not.  She had an ear lavage done at urgent care which did not help.  She denies loud noise exposure, ear pain, head trauma.  She is interested in being evaluated by audiology.

## 2022-08-18 NOTE — ASSESSMENT & PLAN NOTE
She reports a lot of burping lately.  She states that it is very difficult for her to burp and she feels like something is being shoved down her throat that is preventing her from getting the martine out.  She gets very nervous and anxious when she has to burp.  She feels like she has to stand up and straighten out her spine or even arched backwards to allow it to pass.  She has noted worsening heartburn lately.  In the past, she took Prilosec briefly but states it gave her severe gas and so she stopped.  She is not currently using any antacids.  She has a history of GERD and was told she had Marley's esophagus when she was in her 20s.  She follows with [facility] and plans to make an appointment to be seen shortly.  She reports persistent nausea and sometimes she vomits.  She denies melena or hematochezia.  She does consume a fair amount of gluten and does not remember being tested for celiac disease.  She does not remember being tested for H. pylori.

## 2022-08-19 PROBLEM — L65.9 HAIR LOSS: Status: ACTIVE | Noted: 2022-08-19

## 2022-08-19 PROBLEM — M06.9 RHEUMATOID ARTHRITIS (HCC): Status: RESOLVED | Noted: 2018-05-23 | Resolved: 2022-08-19

## 2022-08-19 PROBLEM — N60.81 CYST OF SKIN OF RIGHT BREAST: Status: RESOLVED | Noted: 2019-02-01 | Resolved: 2022-08-19

## 2022-08-19 PROBLEM — M54.50 ACUTE BILATERAL LOW BACK PAIN WITHOUT SCIATICA: Status: RESOLVED | Noted: 2020-11-11 | Resolved: 2022-08-19

## 2022-08-19 PROBLEM — R93.89 ABNORMAL CAT SCAN: Status: RESOLVED | Noted: 2018-06-21 | Resolved: 2022-08-19

## 2022-08-19 NOTE — ASSESSMENT & PLAN NOTE
States that she has been diagnosed with diabetic gastroparesis in the past through her GI provider.  She is not sure if it is gotten more severe.  She does report early satiety in addition to the burping as well as more GERD lately.

## 2022-08-19 NOTE — ASSESSMENT & PLAN NOTE
She reports that her right shoulder has been very painful for about a year and a half.  She has had difficulty moving it at all and she does most of her activities with her left.  She cannot recall any injuries to the shoulder.

## 2022-08-19 NOTE — ASSESSMENT & PLAN NOTE
She is currently under the care of Dr. Armendariz of endocrinology.  He prescribes her diabetic supplies and performs her diabetic testing.  He also prescribes her high-dose opiates and benzos.

## 2022-08-19 NOTE — ASSESSMENT & PLAN NOTE
"Blood pressure is quite high in clinic today at 160/100.  Patient states that she has \"blood pressures all over the place\" at home.  Sometimes she will be in the 90s over 60s.  She has had some syncopal and presyncopal episodes related to low blood pressure.  She is not currently on any medication and declines prescription.  "

## 2022-08-19 NOTE — PROGRESS NOTES
Subjective:   Yuki Riddle is a 57 y.o. female here today for burping, shoulder pain, chronic issues below    Burping  She reports a lot of burping lately.  She states that it is very difficult for her to burp and she feels like something is being shoved down her throat that is preventing her from getting the martine out.  She gets very nervous and anxious when she has to burp.  She feels like she has to stand up and straighten out her spine or even arched backwards to allow it to pass.  She has noted worsening heartburn lately.  In the past, she took Prilosec briefly but states it gave her severe gas and so she stopped.  She is not currently using any antacids.  She has a history of GERD and was told she had Marley's esophagus when she was in her 20s.  She follows with [facility] and plans to make an appointment to be seen shortly.  She reports persistent nausea and sometimes she vomits.  She denies melena or hematochezia.  She does consume a fair amount of gluten and does not remember being tested for celiac disease.  She does not remember being tested for H. pylori.    Hearing loss  She states that approximately 1 month after vi COVID, she woke up 1 day and could not hear out of the right ear.  States she has continued to wait for her hearing to return but it has not.  She had an ear lavage done at urgent care which did not help.  She denies loud noise exposure, ear pain, head trauma.  She is interested in being evaluated by audiology.    Diabetic gastroparesis associated with type 1 diabetes mellitus (HCC)  States that she has been diagnosed with diabetic gastroparesis in the past through her GI provider.  She is not sure if it is gotten more severe.  She does report early satiety in addition to the burping as well as more GERD lately.    Hair loss  She states that she has lost significant amounts of hair after vi COVID.  She has started to grow small wisps.  She reports losing a  "lot of hair in the back.  She states that she followed up with her endocrinologist for this and he ordered labs but she is not sure if thyroid was ordered.    Painful diabetic neuropathy (CMS-HCC)  Neuropathy most severely affects her hands.  She has been using wrist braces to help with this.  We last gave her a pair in 2020 and she states that they are very worn out and she is requesting a new pair today.    Elevated blood pressure reading  Blood pressure is quite high in clinic today at 160/100.  Patient states that she has \"blood pressures all over the place\" at home.  Sometimes she will be in the 90s over 60s.  She has had some syncopal and presyncopal episodes related to low blood pressure.  She is not currently on any medication and declines prescription.    Chronic right shoulder pain  She reports that her right shoulder has been very painful for about a year and a half.  She has had difficulty moving it at all and she does most of her activities with her left.  She cannot recall any injuries to the shoulder.    Type 1 diabetes mellitus with complication ([facility])  She is currently under the care of Dr. Armendariz of endocrinology.  He prescribes her diabetic supplies and performs her diabetic testing.  He also prescribes her high-dose opiates and benzos.       Current medicines (including changes today)  Current Outpatient Medications   Medication Sig Dispense Refill    ibuprofen (MOTRIN) 600 MG Tab Take 1 Tab by mouth every 6 hours as needed. 30 Tab 0    ONETOUCH ULTRA strip       promethazine (PHENERGAN) 25 MG Tab Take  by mouth. Take 1 tablet by mouth two times a day      hydrOXYzine HCl (ATARAX) 25 MG Tab Take 1 Tab by mouth 3 times a day as needed for Itching. 30 Tab 0    diazepam (VALIUM) 10 MG tablet TK 1 T PO  EVERY 6 HOURS  5    HUMALOG KWIKPEN 100 UNIT/ML Solution Pen-injector injection INJECT SC UP TO 30 UNITS PER DAY PER SLIDING SCALE.  1    PROMETHEGAN 25 MG Suppos INSERT 1 SUPPOSITORY RECTALLY PRN " NAUSEA  2    cyclobenzaprine (FLEXERIL) 10 MG Tab Take 10 mg by mouth 3 times a day as needed for Muscle Spasms.      gabapentin (NEURONTIN) 600 MG tablet Take 1,200 mg by mouth as needed.      HYDROcodone/acetaminophen (NORCO)  MG Tab Take 2 Tabs by mouth 4 times a day.      buPROPion SR (WELLBUTRIN-SR) 150 MG TABLET SR 12 HR sustained-release tablet Take 150 mg by mouth 2 times a day.      XTAMPZA ER 36 MG Capsule Extended Release 12 hour Abuse-Deterrent Take  by mouth 2 times a day.      TOUJEO SOLOSTAR 300 UNIT/ML Solution Pen-injector INJECT 28 UNITS UNDER THE SKIN EVERY NIGHT AT BEDTIME      B-D ULTRAFINE III SHORT PEN USE AS DIRECTED DAILY WITH INSULIN PEN  2     No current facility-administered medications for this visit.     She  has a past medical history of Arthritis, Cataract, COPD (chronic obstructive pulmonary disease) ([facility]), Diabetes, Diabetic neuropathy ([facility]), Diabetic retinopathy, Diabetic retinopathy ([facility]), Fibromyalgia, Hypertension, Migraine, POTS (postural orthostatic tachycardia syndrome), Recurrent UTI, Rheumatoid arthritis ([facility]), Rheumatoid arthritis ([facility]), Sleep apnea, and TIA (transient ischemic attack).         Objective:     Vitals:    08/18/22 1657   BP: (!) 160/100   Pulse: (!) 102   Resp: 16   Temp: 36.6 °C (97.9 °F)   SpO2: 94%     Body mass index is 27.79 kg/m².   Physical Exam:  Constitutional: Alert, no distress.  Skin: Warm, dry, good turgor, no rashes in visible areas numerous excoriated lesions over arms and legs, hair is very thin diffusely across scalp.  Eye: Equal, round and reactive, conjunctiva clear, lids normal.  ENT: TMs are clear bilaterally  Abdomen: Soft, mild tenderness to palpation in epigastric region without rebound or guarding, no masses, no hepatosplenomegaly.  Psych: Alert and oriented x3, normal affect and mood.  MSK: Range of motion of right shoulder limited to about 30 degrees abduction and 30 degrees forward flexion.  There is tenderness to palpation  "over the anterior glenohumeral joint, the biceps, and the deltoid.  Positive impingement sign.    Assessment and Plan:   The following treatment plan was discussed    1. Burping  Etiology is unclear.  I have encouraged her to follow-up with her GI doctor as she may benefit from repeat endoscopy given her history of GERD and Marley's esophagus.  We discussed checking for celiac disease given her comorbid type 1 diabetes and also ruling out H. pylori.  We discussed trial of an OTC antacid.  She does not want to take a PPI so we discussed Pepcid  -Follow-up with GI  -Trial of Pepcid 20 to 40 mg daily as needed  - CELIAC DISEASE AB PANEL; Future  - H. PYLORI, UREA BREATH TEST, ADULT; Future    2. Gastroesophageal reflux disease, unspecified whether esophagitis present  See above    3. Diabetic gastroparesis associated with type 1 diabetes mellitus (HCC)  May have worsened.  Could consider repeating a gastric emptying study depending on GI follow-up and above testing    4. Screening mammogram, encounter for  - MA-SCREENING MAMMO BILAT W/ IMPLANTS W/CAD; Future    5. Hair loss  Etiology unclear.  We will add a thyroid test to her lab  - TSH WITH REFLEX TO FT4; Future    6. Hearing loss of right ear, unspecified hearing loss type  No evidence of cerumen impaction on exam.  Discussed follow-up with audiology for formal hearing testing.  - Referral to Audiology    7. Chronic right shoulder pain  Symptoms are consistent with frozen shoulder due to primary rotator cuff tendinopathy.  Discussed initial imaging, physical therapy.  If no improvement or she cannot tolerate physical therapy, would proceed with MRI and orthopedic referral if appropriate.  - DX-SHOULDER 2+ RIGHT; Future  - Referral to Physical Therapy    8. Painful diabetic neuropathy (HCC)  Bilateral wrist braces given in clinic today    9. Elevated blood pressure reading  She reports a labile blood pressure and states \"you cannot treat me with blood pressure " "medications because of my POTS\"    10. Type 1 diabetes mellitus with complication (HCC)  Unclear level of control.  She will continue to follow-up with Dr. Quesada who is prescribing her medications.        Followup: Return if symptoms worsen or fail to improve.           "

## 2022-08-19 NOTE — ASSESSMENT & PLAN NOTE
Neuropathy most severely affects her hands.  She has been using wrist braces to help with this.  We last gave her a pair in 2020 and she states that they are very worn out and she is requesting a new pair today.

## 2022-08-19 NOTE — ASSESSMENT & PLAN NOTE
She states that she has lost significant amounts of hair after vi COVID.  She has started to grow small wisps.  She reports losing a lot of hair in the back.  She states that she followed up with her endocrinologist for this and he ordered labs but she is not sure if thyroid was ordered.

## 2023-06-07 ENCOUNTER — PHARMACY VISIT (OUTPATIENT)
Dept: PHARMACY | Facility: MEDICAL CENTER | Age: 58
End: 2023-06-07
Payer: COMMERCIAL

## 2023-06-07 PROCEDURE — RXOTC WILLOW AMBULATORY OTC CHARGE

## 2023-06-07 PROCEDURE — RXMED WILLOW AMBULATORY MEDICATION CHARGE: Performed by: INTERNAL MEDICINE

## 2023-06-07 RX ORDER — INSULIN GLARGINE 300 U/ML
INJECTION, SOLUTION SUBCUTANEOUS
Qty: 3 ML | Refills: 0 | Status: CANCELLED | OUTPATIENT
Start: 2023-06-07

## 2023-06-07 RX ORDER — INSULIN GLARGINE 300 U/ML
INJECTION, SOLUTION SUBCUTANEOUS
Qty: 3 ML | Refills: 0 | Status: ON HOLD | OUTPATIENT
Start: 2023-06-07 | End: 2023-10-01

## 2023-07-05 PROCEDURE — RXMED WILLOW AMBULATORY MEDICATION CHARGE: Performed by: INTERNAL MEDICINE

## 2023-07-06 ENCOUNTER — PHARMACY VISIT (OUTPATIENT)
Dept: PHARMACY | Facility: MEDICAL CENTER | Age: 58
End: 2023-07-06
Payer: COMMERCIAL

## 2023-09-07 ENCOUNTER — HOSPITAL ENCOUNTER (OUTPATIENT)
Dept: LAB | Facility: MEDICAL CENTER | Age: 58
End: 2023-09-07
Attending: INTERNAL MEDICINE
Payer: MEDICAID

## 2023-09-07 LAB
25(OH)D3 SERPL-MCNC: 11 NG/ML (ref 30–100)
ALBUMIN SERPL BCP-MCNC: 4.1 G/DL (ref 3.2–4.9)
ALBUMIN/GLOB SERPL: 1.7 G/DL
ALP SERPL-CCNC: 214 U/L (ref 30–99)
ALT SERPL-CCNC: 39 U/L (ref 2–50)
ANION GAP SERPL CALC-SCNC: 11 MMOL/L (ref 7–16)
AST SERPL-CCNC: 32 U/L (ref 12–45)
BASOPHILS # BLD AUTO: 0.8 % (ref 0–1.8)
BASOPHILS # BLD: 0.04 K/UL (ref 0–0.12)
BILIRUB SERPL-MCNC: 0.2 MG/DL (ref 0.1–1.5)
BUN SERPL-MCNC: 21 MG/DL (ref 8–22)
CALCIUM ALBUM COR SERPL-MCNC: 8.9 MG/DL (ref 8.5–10.5)
CALCIUM SERPL-MCNC: 9 MG/DL (ref 8.5–10.5)
CHLORIDE SERPL-SCNC: 105 MMOL/L (ref 96–112)
CO2 SERPL-SCNC: 21 MMOL/L (ref 20–33)
CREAT SERPL-MCNC: 1.36 MG/DL (ref 0.5–1.4)
EOSINOPHIL # BLD AUTO: 0.15 K/UL (ref 0–0.51)
EOSINOPHIL NFR BLD: 3.1 % (ref 0–6.9)
ERYTHROCYTE [DISTWIDTH] IN BLOOD BY AUTOMATED COUNT: 49.8 FL (ref 35.9–50)
EST. AVERAGE GLUCOSE BLD GHB EST-MCNC: 194 MG/DL
ESTRADIOL SERPL-MCNC: 14.6 PG/ML
FERRITIN SERPL-MCNC: 102 NG/ML (ref 10–291)
FSH SERPL-ACNC: 35.9 MIU/ML
GFR SERPLBLD CREATININE-BSD FMLA CKD-EPI: 45 ML/MIN/1.73 M 2
GLOBULIN SER CALC-MCNC: 2.4 G/DL (ref 1.9–3.5)
GLUCOSE SERPL-MCNC: 202 MG/DL (ref 65–99)
HBA1C MFR BLD: 8.4 % (ref 4–5.6)
HCT VFR BLD AUTO: 33 % (ref 37–47)
HGB BLD-MCNC: 11.1 G/DL (ref 12–16)
IMM GRANULOCYTES # BLD AUTO: 0.02 K/UL (ref 0–0.11)
IMM GRANULOCYTES NFR BLD AUTO: 0.4 % (ref 0–0.9)
LH SERPL-ACNC: 22.6 IU/L
LYMPHOCYTES # BLD AUTO: 1.19 K/UL (ref 1–4.8)
LYMPHOCYTES NFR BLD: 24.5 % (ref 22–41)
MCH RBC QN AUTO: 33.4 PG (ref 27–33)
MCHC RBC AUTO-ENTMCNC: 33.6 G/DL (ref 32.2–35.5)
MCV RBC AUTO: 99.4 FL (ref 81.4–97.8)
MONOCYTES # BLD AUTO: 0.3 K/UL (ref 0–0.85)
MONOCYTES NFR BLD AUTO: 6.2 % (ref 0–13.4)
NEUTROPHILS # BLD AUTO: 3.15 K/UL (ref 1.82–7.42)
NEUTROPHILS NFR BLD: 65 % (ref 44–72)
NRBC # BLD AUTO: 0 K/UL
NRBC BLD-RTO: 0 /100 WBC (ref 0–0.2)
PHOSPHATE SERPL-MCNC: 3.8 MG/DL (ref 2.5–4.5)
PLATELET # BLD AUTO: 204 K/UL (ref 164–446)
PMV BLD AUTO: 11.8 FL (ref 9–12.9)
POTASSIUM SERPL-SCNC: 4.9 MMOL/L (ref 3.6–5.5)
PROT SERPL-MCNC: 6.5 G/DL (ref 6–8.2)
PTH-INTACT SERPL-MCNC: 51.7 PG/ML (ref 14–72)
RBC # BLD AUTO: 3.32 M/UL (ref 4.2–5.4)
SODIUM SERPL-SCNC: 137 MMOL/L (ref 135–145)
T3FREE SERPL-MCNC: 3.1 PG/ML (ref 2–4.4)
T4 FREE SERPL-MCNC: 0.85 NG/DL (ref 0.93–1.7)
TSH SERPL DL<=0.005 MIU/L-ACNC: 3.51 UIU/ML (ref 0.38–5.33)
WBC # BLD AUTO: 4.9 K/UL (ref 4.8–10.8)

## 2023-09-07 PROCEDURE — 84133 ASSAY OF URINE POTASSIUM: CPT

## 2023-09-07 PROCEDURE — 82088 ASSAY OF ALDOSTERONE: CPT

## 2023-09-07 PROCEDURE — 84481 FREE ASSAY (FT-3): CPT

## 2023-09-07 PROCEDURE — 84300 ASSAY OF URINE SODIUM: CPT

## 2023-09-07 PROCEDURE — 80307 DRUG TEST PRSMV CHEM ANLYZR: CPT

## 2023-09-07 PROCEDURE — 82652 VIT D 1 25-DIHYDROXY: CPT

## 2023-09-07 PROCEDURE — 84105 ASSAY OF URINE PHOSPHORUS: CPT

## 2023-09-07 PROCEDURE — 82570 ASSAY OF URINE CREATININE: CPT | Mod: XU

## 2023-09-07 PROCEDURE — 83970 ASSAY OF PARATHORMONE: CPT

## 2023-09-07 PROCEDURE — 84100 ASSAY OF PHOSPHORUS: CPT

## 2023-09-07 PROCEDURE — 83002 ASSAY OF GONADOTROPIN (LH): CPT

## 2023-09-07 PROCEDURE — 82670 ASSAY OF TOTAL ESTRADIOL: CPT

## 2023-09-07 PROCEDURE — 83945 ASSAY OF OXALATE: CPT

## 2023-09-07 PROCEDURE — 84439 ASSAY OF FREE THYROXINE: CPT

## 2023-09-07 PROCEDURE — 80053 COMPREHEN METABOLIC PANEL: CPT

## 2023-09-07 PROCEDURE — 36415 COLL VENOUS BLD VENIPUNCTURE: CPT

## 2023-09-07 PROCEDURE — 83036 HEMOGLOBIN GLYCOSYLATED A1C: CPT

## 2023-09-07 PROCEDURE — 82340 ASSAY OF CALCIUM IN URINE: CPT

## 2023-09-07 PROCEDURE — G0480 DRUG TEST DEF 1-7 CLASSES: HCPCS

## 2023-09-07 PROCEDURE — 85025 COMPLETE CBC W/AUTO DIFF WBC: CPT

## 2023-09-07 PROCEDURE — 82728 ASSAY OF FERRITIN: CPT

## 2023-09-07 PROCEDURE — 84305 ASSAY OF SOMATOMEDIN: CPT

## 2023-09-07 PROCEDURE — 84443 ASSAY THYROID STIM HORMONE: CPT

## 2023-09-07 PROCEDURE — 83001 ASSAY OF GONADOTROPIN (FSH): CPT

## 2023-09-07 PROCEDURE — 82306 VITAMIN D 25 HYDROXY: CPT

## 2023-09-08 LAB
CREAT UR-MCNC: 108.91 MG/DL
POTASSIUM UR-SCNC: 41 MMOL/L
SODIUM UR-SCNC: 51 MMOL/L

## 2023-09-09 LAB
1,25(OH)2D3 SERPL-MCNC: 19.9 PG/ML (ref 19.9–79.3)
ALDOST SERPL-MCNC: 14.9 NG/DL
AMPHET CTO UR CFM-MCNC: POSITIVE NG/ML
BARBITURATES CTO UR CFM-MCNC: NEGATIVE NG/ML
BENZODIAZ CTO UR CFM-MCNC: POSITIVE NG/ML
CALCIUM 24H UR-MCNC: 3.6 MG/DL
CALCIUM 24H UR-MRATE: NORMAL MG/D (ref 100–250)
CALCIUM/CREAT 24H UR: 33 MG/G (ref 20–300)
CANNABINOIDS CTO UR CFM-MCNC: NEGATIVE NG/ML
COCAINE CTO UR CFM-MCNC: NEGATIVE NG/ML
COLLECT DURATION TIME SPEC: NORMAL HRS
COLLECT DURATION TIME SPEC: NORMAL HRS
CREAT 24H UR-MCNC: 110 MG/DL
CREAT 24H UR-MRATE: NORMAL MG/D (ref 500–1400)
DRUG COMMENT 753798: NORMAL
IGF-I SERPL-MCNC: 129 NG/ML (ref 47–236)
IGF-I Z-SCORE SERPL: 0.3
METHADONE CTO UR CFM-MCNC: NEGATIVE NG/ML
OPIATES CTO UR CFM-MCNC: POSITIVE NG/ML
PCP CTO UR CFM-MCNC: NEGATIVE NG/ML
PHOSPHATE 24H UR-MCNC: 62 MG/DL
PHOSPHATE 24H UR-MRATE: NORMAL MG/D (ref 400–1300)
PHOSPHATE/CREAT 24H UR: 564 MG/G
PROPOXYPH CTO UR CFM-MCNC: NEGATIVE NG/ML
SPECIMEN VOL ?TM UR: NORMAL ML
TOTAL VOLUME 1105: NORMAL ML

## 2023-09-12 LAB
1OH-MIDAZOLAM UR CFM-MCNC: <20 NG/ML
6MAM UR CFM-MCNC: <10 NG/ML
7AMINOCLONAZEPAM UR CFM-MCNC: <5 NG/ML
A-OH ALPRAZ UR CFM-MCNC: <5 NG/ML
ALPRAZ UR CFM-MCNC: <5 NG/ML
AMPHET UR CFM-MCNC: <50 NG/ML
CHLORDIAZEP UR CFM-MCNC: <20 NG/ML
CLONAZEPAM UR CFM-MCNC: <5 NG/ML
CODEINE UR CFM-MCNC: <20 NG/ML
DIAZEPAM UR CFM-MCNC: <20 NG/ML
HYDROCODONE UR CFM-MCNC: >4000 NG/ML
HYDROMORPHONE UR CFM-MCNC: 65 NG/ML
LORAZEPAM UR CFM-MCNC: <20 NG/ML
MDA UR CFM-MCNC: <200 NG/ML
MDEA UR CFM-MCNC: <200 NG/ML
MDMA UR CFM-MCNC: <200 NG/ML
METHAMPHET UR CFM-MCNC: <200 NG/ML
MIDAZOLAM UR CFM-MCNC: <20 NG/ML
MORPHINE UR CFM-MCNC: <20 NG/ML
NORDIAZEPAM UR CFM-MCNC: 1452 NG/ML
NORHYDROCODONE UR CFM-MCNC: >4000 NG/ML
NOROXYCODONE UR CFM-MCNC: 72 NG/ML
OPIATES UR NOROXYM Q0836: <20 NG/ML
OXAZEPAM UR CFM-MCNC: >4000 NG/ML
OXYCODONE UR CFM-MCNC: <20 NG/ML
OXYMORPHONE UR CFM-MCNC: <20 NG/ML
PHENTERMINE UR CFM-MCNC: <200 NG/ML
TEMAZEPAM UR CFM-MCNC: >4000 NG/ML

## 2023-09-13 LAB
COLLECT DURATION TIME SPEC: NORMAL H
CREAT 24H UR-MCNC: 113 MG/DL
OXALATE 24H UR-MCNC: 22 MG/L
OXALATE 24H UR-MRATE: NORMAL MG/D (ref 13–40)
TOTAL VOLUME 1105: NORMAL ML

## 2023-09-28 ENCOUNTER — PATIENT MESSAGE (OUTPATIENT)
Dept: MEDICAL GROUP | Facility: MEDICAL CENTER | Age: 58
End: 2023-09-28
Payer: MEDICAID

## 2023-09-28 DIAGNOSIS — H91.91 HEARING LOSS OF RIGHT EAR, UNSPECIFIED HEARING LOSS TYPE: ICD-10-CM

## 2023-10-01 ENCOUNTER — HOSPITAL ENCOUNTER (OUTPATIENT)
Facility: MEDICAL CENTER | Age: 58
End: 2023-10-02
Attending: EMERGENCY MEDICINE | Admitting: INTERNAL MEDICINE
Payer: MEDICAID

## 2023-10-01 ENCOUNTER — APPOINTMENT (OUTPATIENT)
Dept: RADIOLOGY | Facility: MEDICAL CENTER | Age: 58
End: 2023-10-01
Attending: INTERNAL MEDICINE
Payer: MEDICAID

## 2023-10-01 DIAGNOSIS — L03.113 CELLULITIS OF RIGHT WRIST: ICD-10-CM

## 2023-10-01 DIAGNOSIS — E16.2 HYPOGLYCEMIA: ICD-10-CM

## 2023-10-01 DIAGNOSIS — N17.9 AKI (ACUTE KIDNEY INJURY) (HCC): ICD-10-CM

## 2023-10-01 DIAGNOSIS — T23.271S: ICD-10-CM

## 2023-10-01 LAB
ALBUMIN SERPL BCP-MCNC: 4.3 G/DL (ref 3.2–4.9)
ALBUMIN/GLOB SERPL: 1.7 G/DL
ALP SERPL-CCNC: 184 U/L (ref 30–99)
ALT SERPL-CCNC: 24 U/L (ref 2–50)
ANION GAP SERPL CALC-SCNC: 12 MMOL/L (ref 7–16)
AST SERPL-CCNC: 25 U/L (ref 12–45)
B-OH-BUTYR SERPL-MCNC: 0.05 MMOL/L (ref 0.02–0.27)
BASE EXCESS BLDV CALC-SCNC: 1 MMOL/L
BASOPHILS # BLD AUTO: 0.9 % (ref 0–1.8)
BASOPHILS # BLD: 0.06 K/UL (ref 0–0.12)
BILIRUB SERPL-MCNC: 0.2 MG/DL (ref 0.1–1.5)
BODY TEMPERATURE: 36.6 CENTIGRADE
BUN SERPL-MCNC: 23 MG/DL (ref 8–22)
CALCIUM ALBUM COR SERPL-MCNC: 9.6 MG/DL (ref 8.5–10.5)
CALCIUM SERPL-MCNC: 9.8 MG/DL (ref 8.5–10.5)
CHLORIDE SERPL-SCNC: 104 MMOL/L (ref 96–112)
CO2 SERPL-SCNC: 26 MMOL/L (ref 20–33)
CREAT SERPL-MCNC: 2.06 MG/DL (ref 0.5–1.4)
EOSINOPHIL # BLD AUTO: 0.15 K/UL (ref 0–0.51)
EOSINOPHIL NFR BLD: 2.3 % (ref 0–6.9)
ERYTHROCYTE [DISTWIDTH] IN BLOOD BY AUTOMATED COUNT: 50.2 FL (ref 35.9–50)
GFR SERPLBLD CREATININE-BSD FMLA CKD-EPI: 28 ML/MIN/1.73 M 2
GLOBULIN SER CALC-MCNC: 2.5 G/DL (ref 1.9–3.5)
GLUCOSE BLD STRIP.AUTO-MCNC: 119 MG/DL (ref 65–99)
GLUCOSE BLD STRIP.AUTO-MCNC: 195 MG/DL (ref 65–99)
GLUCOSE BLD STRIP.AUTO-MCNC: 266 MG/DL (ref 65–99)
GLUCOSE BLD STRIP.AUTO-MCNC: 54 MG/DL (ref 65–99)
GLUCOSE SERPL-MCNC: 49 MG/DL (ref 65–99)
HCO3 BLDV-SCNC: 27 MMOL/L (ref 24–28)
HCT VFR BLD AUTO: 35 % (ref 37–47)
HGB BLD-MCNC: 12 G/DL (ref 12–16)
IMM GRANULOCYTES # BLD AUTO: 0.01 K/UL (ref 0–0.11)
IMM GRANULOCYTES NFR BLD AUTO: 0.2 % (ref 0–0.9)
INHALED O2 FLOW RATE: 0 L/MIN
LYMPHOCYTES # BLD AUTO: 1.71 K/UL (ref 1–4.8)
LYMPHOCYTES NFR BLD: 26.7 % (ref 22–41)
MAGNESIUM SERPL-MCNC: 2.1 MG/DL (ref 1.5–2.5)
MCH RBC QN AUTO: 33.6 PG (ref 27–33)
MCHC RBC AUTO-ENTMCNC: 34.3 G/DL (ref 32.2–35.5)
MCV RBC AUTO: 98 FL (ref 81.4–97.8)
MONOCYTES # BLD AUTO: 0.43 K/UL (ref 0–0.85)
MONOCYTES NFR BLD AUTO: 6.7 % (ref 0–13.4)
NEUTROPHILS # BLD AUTO: 4.04 K/UL (ref 1.82–7.42)
NEUTROPHILS NFR BLD: 63.2 % (ref 44–72)
NRBC # BLD AUTO: 0 K/UL
NRBC BLD-RTO: 0 /100 WBC (ref 0–0.2)
PCO2 BLDV: 47.6 MMHG (ref 41–51)
PH BLDV: 7.36 [PH] (ref 7.31–7.45)
PLATELET # BLD AUTO: 251 K/UL (ref 164–446)
PMV BLD AUTO: 11.1 FL (ref 9–12.9)
PO2 BLDV: 32.3 MMHG (ref 25–40)
POTASSIUM SERPL-SCNC: 4.3 MMOL/L (ref 3.6–5.5)
PROT SERPL-MCNC: 6.8 G/DL (ref 6–8.2)
RBC # BLD AUTO: 3.57 M/UL (ref 4.2–5.4)
SAO2 % BLDV: 56.8 %
SODIUM SERPL-SCNC: 142 MMOL/L (ref 135–145)
WBC # BLD AUTO: 6.4 K/UL (ref 4.8–10.8)

## 2023-10-01 PROCEDURE — 99285 EMERGENCY DEPT VISIT HI MDM: CPT

## 2023-10-01 PROCEDURE — 96374 THER/PROPH/DIAG INJ IV PUSH: CPT

## 2023-10-01 PROCEDURE — G0378 HOSPITAL OBSERVATION PER HR: HCPCS

## 2023-10-01 PROCEDURE — 90715 TDAP VACCINE 7 YRS/> IM: CPT | Performed by: EMERGENCY MEDICINE

## 2023-10-01 PROCEDURE — 80053 COMPREHEN METABOLIC PANEL: CPT

## 2023-10-01 PROCEDURE — 700102 HCHG RX REV CODE 250 W/ 637 OVERRIDE(OP): Mod: UD | Performed by: INTERNAL MEDICINE

## 2023-10-01 PROCEDURE — 700111 HCHG RX REV CODE 636 W/ 250 OVERRIDE (IP): Performed by: EMERGENCY MEDICINE

## 2023-10-01 PROCEDURE — 700101 HCHG RX REV CODE 250: Mod: UD | Performed by: EMERGENCY MEDICINE

## 2023-10-01 PROCEDURE — 96372 THER/PROPH/DIAG INJ SC/IM: CPT

## 2023-10-01 PROCEDURE — 85025 COMPLETE CBC W/AUTO DIFF WBC: CPT

## 2023-10-01 PROCEDURE — 36415 COLL VENOUS BLD VENIPUNCTURE: CPT

## 2023-10-01 PROCEDURE — 83735 ASSAY OF MAGNESIUM: CPT

## 2023-10-01 PROCEDURE — 700102 HCHG RX REV CODE 250 W/ 637 OVERRIDE(OP): Mod: UD | Performed by: EMERGENCY MEDICINE

## 2023-10-01 PROCEDURE — 700105 HCHG RX REV CODE 258: Mod: UD | Performed by: INTERNAL MEDICINE

## 2023-10-01 PROCEDURE — 700111 HCHG RX REV CODE 636 W/ 250 OVERRIDE (IP): Mod: UD | Performed by: INTERNAL MEDICINE

## 2023-10-01 PROCEDURE — 86341 ISLET CELL ANTIBODY: CPT

## 2023-10-01 PROCEDURE — 90471 IMMUNIZATION ADMIN: CPT

## 2023-10-01 PROCEDURE — 76775 US EXAM ABDO BACK WALL LIM: CPT

## 2023-10-01 PROCEDURE — 96361 HYDRATE IV INFUSION ADD-ON: CPT

## 2023-10-01 PROCEDURE — 99223 1ST HOSP IP/OBS HIGH 75: CPT | Performed by: INTERNAL MEDICINE

## 2023-10-01 PROCEDURE — 82803 BLOOD GASES ANY COMBINATION: CPT

## 2023-10-01 PROCEDURE — 82962 GLUCOSE BLOOD TEST: CPT | Mod: 91

## 2023-10-01 PROCEDURE — A9270 NON-COVERED ITEM OR SERVICE: HCPCS | Mod: UD | Performed by: INTERNAL MEDICINE

## 2023-10-01 PROCEDURE — 700105 HCHG RX REV CODE 258: Mod: UD | Performed by: EMERGENCY MEDICINE

## 2023-10-01 PROCEDURE — 82010 KETONE BODYS QUAN: CPT

## 2023-10-01 PROCEDURE — A9270 NON-COVERED ITEM OR SERVICE: HCPCS | Mod: UD | Performed by: EMERGENCY MEDICINE

## 2023-10-01 RX ORDER — GABAPENTIN 400 MG/1
1200 CAPSULE ORAL
Status: DISCONTINUED | OUTPATIENT
Start: 2023-10-01 | End: 2023-10-02 | Stop reason: HOSPADM

## 2023-10-01 RX ORDER — BISACODYL 10 MG
10 SUPPOSITORY, RECTAL RECTAL
Status: DISCONTINUED | OUTPATIENT
Start: 2023-10-01 | End: 2023-10-02 | Stop reason: HOSPADM

## 2023-10-01 RX ORDER — PROMETHAZINE HYDROCHLORIDE 25 MG/1
12.5-25 SUPPOSITORY RECTAL EVERY 4 HOURS PRN
Status: DISCONTINUED | OUTPATIENT
Start: 2023-10-01 | End: 2023-10-02 | Stop reason: HOSPADM

## 2023-10-01 RX ORDER — HYDROXYZINE HYDROCHLORIDE 25 MG/1
25 TABLET, FILM COATED ORAL 3 TIMES DAILY PRN
Status: DISCONTINUED | OUTPATIENT
Start: 2023-10-01 | End: 2023-10-02 | Stop reason: HOSPADM

## 2023-10-01 RX ORDER — LABETALOL HYDROCHLORIDE 5 MG/ML
10 INJECTION, SOLUTION INTRAVENOUS EVERY 4 HOURS PRN
Status: DISCONTINUED | OUTPATIENT
Start: 2023-10-01 | End: 2023-10-02 | Stop reason: HOSPADM

## 2023-10-01 RX ORDER — ONDANSETRON 2 MG/ML
4 INJECTION INTRAMUSCULAR; INTRAVENOUS EVERY 4 HOURS PRN
Status: DISCONTINUED | OUTPATIENT
Start: 2023-10-01 | End: 2023-10-02 | Stop reason: HOSPADM

## 2023-10-01 RX ORDER — SODIUM CHLORIDE, SODIUM LACTATE, POTASSIUM CHLORIDE, CALCIUM CHLORIDE 600; 310; 30; 20 MG/100ML; MG/100ML; MG/100ML; MG/100ML
1000 INJECTION, SOLUTION INTRAVENOUS ONCE
Status: COMPLETED | OUTPATIENT
Start: 2023-10-01 | End: 2023-10-01

## 2023-10-01 RX ORDER — ONDANSETRON 4 MG/1
4 TABLET, ORALLY DISINTEGRATING ORAL EVERY 4 HOURS PRN
Status: DISCONTINUED | OUTPATIENT
Start: 2023-10-01 | End: 2023-10-02 | Stop reason: HOSPADM

## 2023-10-01 RX ORDER — OXYCODONE HCL 20 MG/1
20 TABLET, FILM COATED, EXTENDED RELEASE ORAL 3 TIMES DAILY
Status: DISCONTINUED | OUTPATIENT
Start: 2023-10-01 | End: 2023-10-02 | Stop reason: HOSPADM

## 2023-10-01 RX ORDER — METHOCARBAMOL 500 MG/1
500 TABLET, FILM COATED ORAL 2 TIMES DAILY
Status: DISCONTINUED | OUTPATIENT
Start: 2023-10-01 | End: 2023-10-01

## 2023-10-01 RX ORDER — POLYETHYLENE GLYCOL 3350 17 G/17G
1 POWDER, FOR SOLUTION ORAL
Status: DISCONTINUED | OUTPATIENT
Start: 2023-10-01 | End: 2023-10-02 | Stop reason: HOSPADM

## 2023-10-01 RX ORDER — DEXTROSE MONOHYDRATE 25 G/50ML
50 INJECTION, SOLUTION INTRAVENOUS ONCE
Status: COMPLETED | OUTPATIENT
Start: 2023-10-01 | End: 2023-10-01

## 2023-10-01 RX ORDER — SODIUM CHLORIDE, SODIUM LACTATE, POTASSIUM CHLORIDE, CALCIUM CHLORIDE 600; 310; 30; 20 MG/100ML; MG/100ML; MG/100ML; MG/100ML
INJECTION, SOLUTION INTRAVENOUS CONTINUOUS
Status: DISCONTINUED | OUTPATIENT
Start: 2023-10-01 | End: 2023-10-02 | Stop reason: HOSPADM

## 2023-10-01 RX ORDER — CYCLOBENZAPRINE HCL 10 MG
10 TABLET ORAL 3 TIMES DAILY PRN
Status: DISCONTINUED | OUTPATIENT
Start: 2023-10-01 | End: 2023-10-02 | Stop reason: HOSPADM

## 2023-10-01 RX ORDER — AMOXICILLIN 250 MG
2 CAPSULE ORAL 2 TIMES DAILY
Status: DISCONTINUED | OUTPATIENT
Start: 2023-10-01 | End: 2023-10-02 | Stop reason: HOSPADM

## 2023-10-01 RX ORDER — DEXTROSE MONOHYDRATE 25 G/50ML
25 INJECTION, SOLUTION INTRAVENOUS
Status: DISCONTINUED | OUTPATIENT
Start: 2023-10-01 | End: 2023-10-02 | Stop reason: HOSPADM

## 2023-10-01 RX ORDER — CEPHALEXIN 500 MG/1
500 CAPSULE ORAL EVERY 6 HOURS
Status: DISCONTINUED | OUTPATIENT
Start: 2023-10-01 | End: 2023-10-02 | Stop reason: HOSPADM

## 2023-10-01 RX ORDER — NICOTINE 21 MG/24HR
14 PATCH, TRANSDERMAL 24 HOURS TRANSDERMAL
Status: DISCONTINUED | OUTPATIENT
Start: 2023-10-01 | End: 2023-10-01

## 2023-10-01 RX ORDER — DIAZEPAM 5 MG/1
10 TABLET ORAL 4 TIMES DAILY
Status: DISCONTINUED | OUTPATIENT
Start: 2023-10-01 | End: 2023-10-01

## 2023-10-01 RX ORDER — DIAZEPAM 5 MG/1
10 TABLET ORAL EVERY 6 HOURS PRN
Status: DISCONTINUED | OUTPATIENT
Start: 2023-10-01 | End: 2023-10-02 | Stop reason: HOSPADM

## 2023-10-01 RX ORDER — HEPARIN SODIUM 5000 [USP'U]/ML
5000 INJECTION, SOLUTION INTRAVENOUS; SUBCUTANEOUS EVERY 8 HOURS
Status: DISCONTINUED | OUTPATIENT
Start: 2023-10-01 | End: 2023-10-02 | Stop reason: HOSPADM

## 2023-10-01 RX ORDER — CEPHALEXIN 500 MG/1
500 CAPSULE ORAL ONCE
Status: COMPLETED | OUTPATIENT
Start: 2023-10-01 | End: 2023-10-01

## 2023-10-01 RX ORDER — HYDROXYZINE HYDROCHLORIDE 25 MG/1
25 TABLET, FILM COATED ORAL 2 TIMES DAILY
COMMUNITY

## 2023-10-01 RX ORDER — OXYCODONE HCL 20 MG/1
20 TABLET, FILM COATED, EXTENDED RELEASE ORAL 3 TIMES DAILY
COMMUNITY

## 2023-10-01 RX ORDER — PROMETHAZINE HYDROCHLORIDE 25 MG/1
12.5-25 TABLET ORAL EVERY 4 HOURS PRN
Status: DISCONTINUED | OUTPATIENT
Start: 2023-10-01 | End: 2023-10-02 | Stop reason: HOSPADM

## 2023-10-01 RX ORDER — ALISKIREN 150 MG/1
150 TABLET, FILM COATED ORAL DAILY
Status: DISCONTINUED | OUTPATIENT
Start: 2023-10-01 | End: 2023-10-01

## 2023-10-01 RX ORDER — ACETAMINOPHEN 325 MG/1
650 TABLET ORAL EVERY 6 HOURS PRN
Status: DISCONTINUED | OUTPATIENT
Start: 2023-10-01 | End: 2023-10-02 | Stop reason: HOSPADM

## 2023-10-01 RX ORDER — HYDROCODONE BITARTRATE AND ACETAMINOPHEN 10; 325 MG/1; MG/1
2 TABLET ORAL EVERY 6 HOURS PRN
Status: DISCONTINUED | OUTPATIENT
Start: 2023-10-01 | End: 2023-10-02 | Stop reason: HOSPADM

## 2023-10-01 RX ORDER — SENNOSIDES A AND B 8.6 MG/1
8.6 TABLET, FILM COATED ORAL
COMMUNITY

## 2023-10-01 RX ORDER — SODIUM CHLORIDE 9 MG/ML
1000 INJECTION, SOLUTION INTRAVENOUS ONCE
Status: COMPLETED | OUTPATIENT
Start: 2023-10-01 | End: 2023-10-01

## 2023-10-01 RX ORDER — BUPROPION HYDROCHLORIDE 150 MG/1
150 TABLET, EXTENDED RELEASE ORAL 2 TIMES DAILY
Status: DISCONTINUED | OUTPATIENT
Start: 2023-10-01 | End: 2023-10-01

## 2023-10-01 RX ADMIN — CEPHALEXIN 500 MG: 500 CAPSULE ORAL at 11:29

## 2023-10-01 RX ADMIN — SODIUM CHLORIDE, POTASSIUM CHLORIDE, SODIUM LACTATE AND CALCIUM CHLORIDE: 600; 310; 30; 20 INJECTION, SOLUTION INTRAVENOUS at 13:30

## 2023-10-01 RX ADMIN — CLOSTRIDIUM TETANI TOXOID ANTIGEN (FORMALDEHYDE INACTIVATED), CORYNEBACTERIUM DIPHTHERIAE TOXOID ANTIGEN (FORMALDEHYDE INACTIVATED), BORDETELLA PERTUSSIS TOXOID ANTIGEN (GLUTARALDEHYDE INACTIVATED), BORDETELLA PERTUSSIS FILAMENTOUS HEMAGGLUTININ ANTIGEN (FORMALDEHYDE INACTIVATED), BORDETELLA PERTUSSIS PERTACTIN ANTIGEN, AND BORDETELLA PERTUSSIS FIMBRIAE 2/3 ANTIGEN 0.5 ML: 5; 2; 2.5; 5; 3; 5 INJECTION, SUSPENSION INTRAMUSCULAR at 11:24

## 2023-10-01 RX ADMIN — INSULIN HUMAN 7 UNITS: 100 INJECTION, SOLUTION PARENTERAL at 16:06

## 2023-10-01 RX ADMIN — HYDROCODONE BITARTRATE AND ACETAMINOPHEN 2 TABLET: 10; 325 TABLET ORAL at 15:59

## 2023-10-01 RX ADMIN — SODIUM CHLORIDE, POTASSIUM CHLORIDE, SODIUM LACTATE AND CALCIUM CHLORIDE 1000 ML: 600; 310; 30; 20 INJECTION, SOLUTION INTRAVENOUS at 12:44

## 2023-10-01 RX ADMIN — INSULIN GLARGINE-YFGN 21 UNITS: 100 INJECTION, SOLUTION SUBCUTANEOUS at 20:27

## 2023-10-01 RX ADMIN — CEPHALEXIN 500 MG: 500 CAPSULE ORAL at 22:38

## 2023-10-01 RX ADMIN — CEPHALEXIN 500 MG: 500 CAPSULE ORAL at 16:02

## 2023-10-01 RX ADMIN — DIAZEPAM 10 MG: 5 TABLET ORAL at 15:51

## 2023-10-01 RX ADMIN — HEPARIN SODIUM 5000 UNITS: 5000 INJECTION, SOLUTION INTRAVENOUS; SUBCUTANEOUS at 22:38

## 2023-10-01 RX ADMIN — HYDROCODONE BITARTRATE AND ACETAMINOPHEN 2 TABLET: 10; 325 TABLET ORAL at 22:38

## 2023-10-01 RX ADMIN — DEXTROSE MONOHYDRATE 50 ML: 25 INJECTION, SOLUTION INTRAVENOUS at 11:37

## 2023-10-01 RX ADMIN — SODIUM CHLORIDE 1000 ML: 9 INJECTION, SOLUTION INTRAVENOUS at 11:21

## 2023-10-01 RX ADMIN — HEPARIN SODIUM 5000 UNITS: 5000 INJECTION, SOLUTION INTRAVENOUS; SUBCUTANEOUS at 15:53

## 2023-10-01 RX ADMIN — DIAZEPAM 10 MG: 5 TABLET ORAL at 20:26

## 2023-10-01 ASSESSMENT — FIBROSIS 4 INDEX
FIB4 SCORE: 1.43
FIB4 SCORE: 1.37

## 2023-10-01 ASSESSMENT — PATIENT HEALTH QUESTIONNAIRE - PHQ9
2. FEELING DOWN, DEPRESSED, IRRITABLE, OR HOPELESS: NOT AT ALL
SUM OF ALL RESPONSES TO PHQ9 QUESTIONS 1 AND 2: 0
1. LITTLE INTEREST OR PLEASURE IN DOING THINGS: NOT AT ALL

## 2023-10-01 ASSESSMENT — LIFESTYLE VARIABLES: EVER_SMOKED: YES

## 2023-10-01 ASSESSMENT — PAIN DESCRIPTION - PAIN TYPE
TYPE: ACUTE PAIN

## 2023-10-01 NOTE — PROGRESS NOTES
Med rec completed per patient.   Allergies reviewed with patient.   Patient states that she has had Bactrim without reactions since initial incidence of rash.   NO Noted home antibiotics in past 30 days   Patient preferred pharmacy: [facility].   Pharmacist updated.

## 2023-10-01 NOTE — ASSESSMENT & PLAN NOTE
- Suspect this is NSAID induced, from diclofenac which was started 5 days ago by her outpatient endocrinologist for insulin induced neuropathy in setting of brittle type 1 diabetes mellitus.  -Admit to the CDU/medical floors.  -Hydrate with IV LR at 125 cc/h.  -Will obtain renal ultrasound to rule out obstructive uropathy.  -Monitor for clinical improvement with IV fluid rehydration.  Monitor electrolytes closely.  BMP in the morning.  - avoid nephrotoxins, and continue to renally dose all medications.
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
## 2023-10-01 NOTE — ED NOTES
Polysporin applied to right wrist. Dressing applied with Adaptic dressing and wrapped in Kerlix. Supplies given to take home.
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
## 2023-10-01 NOTE — ED NOTES
Patient stated she felt her blood sugar is low. POCT Glucose checked BGL showed 54mg/dL. ERP made aware. D50 administered.

## 2023-10-01 NOTE — ASSESSMENT & PLAN NOTE
- I spent 4 minutes counseling the patient on cessation techniques and resources were offered including nicotine patches, gum, along with medical treatment with wellbutrin and chantix. The patient understands continuing to smoke could lead to complications such as lung disease, heart attack, stroke and death.  The benefits of stopping were also presented to her. The patient verbalized understanding. CPT CODE: 37967 (3-10 minutes tobacco counseling).  - Will start on nicotine replacement therapy while in-house.
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
## 2023-10-01 NOTE — H&P
Hospital Medicine History & Physical Note    Date of Service  10/1/2023    Primary Care Physician  Renate Gr M.D.    Consultants  None    Code Status  Full Code    Chief Complaint  Chief Complaint   Patient presents with    Wound Check     Burn to right wrist/hand       History of Presenting Illness  Yuki Riddle is a 58 y.o. 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  Since then she noticed that the redness on her right wrist continued to worsen, and that the area has been hurting.  She had no fevers or chills.  She had no other complaints.  However since then, her blood sugars have been running high in the 500s.  Today, it was again in the 500s and she took a total of 31 units of insulin per sliding scale.  Otherwise she states that she thinks she has been eating and drinking adequately.  She denies any nausea, vomiting, diarrhea or abdominal pain.  However she has been and feeling unwell.  She decided to go to the ED today.    ED course:  On evaluation, vital signs were stable.   She had no leukocytosis.  Electrolytes were normal.  However creatinine was elevated at 2.06 from baseline of 0.8-1.1.  Blood glucose was also low at 49. Beta hydroxybutyrate was normal.  Venous pH was normal.  Alkaline phosphatase was 184 which is chronic.  Patient was given IV fluids and tetanus shot, and was subsequently admitted to the hospitalist service.    I personally reviewed all lab results mentioned above. Prior medical records from this institution and outside facilities were independently reviewed as noted. I also personally reviewed all ER physician and consultant recommendations and plans as documented above. History was independently obtained by myself. I discussed the plan of care with patient, bedside RN, charge RN, , and  ERP .    Review of Systems  ROS    Pertinent positives/negatives as mentioned above.     A complete review of systems was personally done by me. All other systems were negative.       Past Medical History   has a past medical history of Arthritis, Cataract, COPD (chronic obstructive pulmonary disease) (HCC), Diabetes, Diabetic neuropathy (HCC), Diabetic retinopathy, Diabetic retinopathy (HCC), Fibromyalgia, Hypertension, Migraine, POTS (postural orthostatic tachycardia syndrome), Recurrent UTI, Rheumatoid arthritis (HCC), Rheumatoid arthritis (HCC), Sleep apnea, and TIA (transient ischemic attack).    Surgical History   has a past surgical history that includes tonsillectomy; cholecystectomy; primary c section; other; foreign body removal (6/18/2013); gastroscopy with balloon dilatation (2/13/2015); and gastroscopy with biopsy (2/13/2015).     Family History  family history includes No Known Problems in her brother, brother, daughter, daughter, and sister.     Social History   reports that she has been smoking cigarettes. She has a 14.5 pack-year smoking history. She has never used smokeless tobacco. She reports that she does not drink alcohol and does not use drugs.    Allergies  Allergies   Allergen Reactions    Compazine      Aggressive behavior     Sulfa Drugs Itching and Rash     Blotchy spots on chest       Medications  Prior to Admission Medications   Prescriptions Last Dose Informant Patient Reported? Taking?   B-D ULTRAFINE III SHORT PEN   Yes No   Sig: USE AS DIRECTED DAILY WITH INSULIN PEN   HUMALOG KWIKPEN 100 UNIT/ML Solution Pen-injector injection   Yes No   Sig: INJECT SC UP TO 30 UNITS PER DAY PER SLIDING SCALE.   HYDROcodone/acetaminophen (NORCO)  MG Tab  Patient Yes No   Sig: Take 2 Tabs by mouth 4 times a day.   Insulin Glargine, 2 Unit Dial, (TOUJEO MAX SOLOSTAR) 300 UNIT/ML Solution Pen-injector   No No   Sig: INJECT 30 UNITS UNDER THE SKIN EVERY NIGHT AT BEDTIME   Insulin Glargine, 2 Unit  Dial, (TOUJEO MAX SOLOSTAR) 300 UNIT/ML Solution Pen-injector   No No   Sig: INJECT 30 UNITS UNDER THE SKIN EVERY NIGHT AT BEDTIME   Insulin Glargine, 2 Unit Dial, (TOUJEO MAX SOLOSTAR) 300 UNIT/ML Solution Pen-injector   No No   Sig: Inject 30 Units under the skin every evening at bedtime.   Naloxone (NARCAN) 4 MG/0.1ML Liquid   Yes No   Sig: CALL 911. SPR CONTENTS OF ONE SPRAYER (0.1ML) INTO ONE NOSTRIL. REPEAT IN 2-3 MIN IF SYMPTOMS OF OPIOID EMERGENCY PERSIST, ALTERNATE NOSTRILS   ONETOUCH ULTRA strip   Yes No   PROMETHEGAN 25 MG Suppos   Yes No   Sig: INSERT 1 SUPPOSITORY RECTALLY PRN NAUSEA   TOUJEO SOLOSTAR 300 UNIT/ML Solution Pen-injector   Yes No   Sig: INJECT 28 UNITS UNDER THE SKIN EVERY NIGHT AT BEDTIME   XTAMPZA ER 36 MG Capsule Extended Release 12 hour Abuse-Deterrent   Yes No   Sig: Take  by mouth 2 times a day.   aliskiren (TEKTURNA) 150 MG tablet   Yes No   Sig: Take 150 mg by mouth every day.   buPROPion SR (WELLBUTRIN-SR) 150 MG TABLET SR 12 HR sustained-release tablet   Yes No   Sig: Take 150 mg by mouth 2 times a day.   cephALEXin (KEFLEX) 500 MG Cap   No No   Sig: Take 1 Capsule by mouth 4 times a day.   cyclobenzaprine (FLEXERIL) 10 MG Tab  Patient Yes No   Sig: Take 10 mg by mouth 3 times a day as needed for Muscle Spasms.   diazepam (VALIUM) 10 MG tablet   Yes No   Sig: TK 1 T PO  EVERY 6 HOURS   gabapentin (NEURONTIN) 600 MG tablet  Patient Yes No   Sig: Take 1,200 mg by mouth as needed.   hydrOXYzine HCl (ATARAX) 25 MG Tab   No No   Sig: Take 1 Tab by mouth 3 times a day as needed for Itching.   ibuprofen (MOTRIN) 600 MG Tab   No No   Sig: Take 1 Tab by mouth every 6 hours as needed.   memantine (NAMENDA) 5 MG Tab   Yes No   methocarbamol (ROBAXIN) 500 MG Tab   Yes No   Sig: Take 500 mg by mouth 2 times a day.   promethazine (PHENERGAN) 25 MG Tab   Yes No   Sig: Take  by mouth. Take 1 tablet by mouth two times a day      Facility-Administered Medications: None       Physical Exam  Temp:   [36.6 °C (97.8 °F)] 36.6 °C (97.8 °F)  Pulse:  [79-91] 79  Resp:  [14] 14  BP: (118-165)/(65-94) 160/79  SpO2:  [94 %-96 %] 96 %  Blood Pressure: (!) 165/73   Temperature: 36.6 °C (97.8 °F)   Pulse: 87   Respiration: 14   Pulse Oximetry: 95 %       Physical Exam  Vitals reviewed.   Constitutional:       General: She is not in acute distress.     Appearance: Normal appearance. She is normal weight. She is not ill-appearing or diaphoretic.   HENT:      Head: Normocephalic and atraumatic.      Right Ear: External ear normal.      Left Ear: External ear normal.      Mouth/Throat:      Mouth: Mucous membranes are moist.      Pharynx: No oropharyngeal exudate or posterior oropharyngeal erythema.   Eyes:      General: No scleral icterus.     Extraocular Movements: Extraocular movements intact.      Conjunctiva/sclera: Conjunctivae normal.      Pupils: Pupils are equal, round, and reactive to light.   Cardiovascular:      Rate and Rhythm: Normal rate and regular rhythm.      Heart sounds: Normal heart sounds. No murmur heard.  Pulmonary:      Effort: Pulmonary effort is normal. No respiratory distress.      Breath sounds: Normal breath sounds. No stridor. No wheezing, rhonchi or rales.   Chest:      Chest wall: No tenderness.   Abdominal:      General: Bowel sounds are normal. There is no distension.      Palpations: Abdomen is soft. There is no mass.      Tenderness: There is no abdominal tenderness. There is no guarding or rebound.   Musculoskeletal:         General: No swelling. Normal range of motion.      Cervical back: Normal range of motion and neck supple. No rigidity. No muscular tenderness.      Right lower leg: No edema.      Left lower leg: No edema.      Comments: Erythema and cellulitis over the right wrist   Lymphadenopathy:      Cervical: No cervical adenopathy.   Skin:     General: Skin is warm and dry.      Coloration: Skin is not jaundiced.      Findings: No rash.   Neurological:      General: No focal  "deficit present.      Mental Status: She is alert and oriented to person, place, and time. Mental status is at baseline.      Cranial Nerves: No cranial nerve deficit.   Psychiatric:         Mood and Affect: Mood normal.         Behavior: Behavior normal.         Thought Content: Thought content normal.         Judgment: Judgment normal.         Laboratory:  Recent Labs     10/01/23  1116   WBC 6.4   RBC 3.57*   HEMOGLOBIN 12.0   HEMATOCRIT 35.0*   MCV 98.0*   MCH 33.6*   MCHC 34.3   RDW 50.2*   PLATELETCT 251   MPV 11.1     Recent Labs     10/01/23  1116   SODIUM 142   POTASSIUM 4.3   CHLORIDE 104   CO2 26   GLUCOSE 49*   BUN 23*   CREATININE 2.06*   CALCIUM 9.8     Recent Labs     10/01/23  1116   ALTSGPT 24   ASTSGOT 25   ALKPHOSPHAT 184*   TBILIRUBIN 0.2   GLUCOSE 49*         No results for input(s): \"NTPROBNP\" in the last 72 hours.      No results for input(s): \"TROPONINT\" in the last 72 hours.    Imaging:  US-RENAL    (Results Pending)         Imaging studies and EKG results were independently reviewed as above.      Assessment/Plan:  Justification for Admission Status  I anticipate this patient is appropriate for observation status at this time because acute kidney injury likely prerenal, along with right wrist cellulitis.      * JOAQIUN (acute kidney injury) (HCC)- (present on admission)  Assessment & Plan  - Suspect this is NSAID induced, from diclofenac which was started 5 days ago by her outpatient endocrinologist for insulin induced neuropathy in setting of brittle type 1 diabetes mellitus.  -Admit to the CDU/medical floors.  -Hydrate with IV LR at 125 cc/h.  -Will obtain renal ultrasound to rule out obstructive uropathy.  -Monitor for clinical improvement with IV fluid rehydration.  Monitor electrolytes closely.  BMP in the morning.  - avoid nephrotoxins, and continue to renally dose all medications.      Cellulitis of right wrist- (present on admission)  Assessment & Plan  - Due to steam burn to the right " wrist.  No signs of sepsis.  WBC count is normal.  -Start oral Keflex.  -Monitor closely for clinical improvement.    Type 1 diabetes mellitus with neuropathy, gastroparesis, and retinopathy ([facility])- (present on admission)  Assessment & Plan  - Her blood glucose trend can fluctuate widely.  BG's has been running high in the 500s since she had burn to the right wrist, but had hypoglycemia after taking 31 units of insulin today.  -Restart Lantus 30 units at bedtime.  I will put her on sliding scale insulin coverage while in the hospital.  Monitor BG's closely. Accu-Cheks before meals and at bedtime. Goal to keep BG between 140-180 per 2019 ADA guidelines.    -She will be on diabetic diet.  -I discussed extensively with patient's outpatient endocrinologist (Dr. Quesada - [phone]), who shared that her diabetes is usually better controlled when she is on her scheduled Valium and high-dose opioids.  Resume home Valium and oxycodone ER.    COPD (chronic obstructive pulmonary disease) (HCC)- (present on admission)  Assessment & Plan  - Not in acute exacerbation.  She will be on RT protocol while in the hospital.        VTE prophylaxis: enoxaparin ppx

## 2023-10-02 VITALS
TEMPERATURE: 98.3 F | HEART RATE: 84 BPM | SYSTOLIC BLOOD PRESSURE: 136 MMHG | OXYGEN SATURATION: 95 % | BODY MASS INDEX: 25.33 KG/M2 | RESPIRATION RATE: 18 BRPM | DIASTOLIC BLOOD PRESSURE: 81 MMHG | HEIGHT: 66 IN | WEIGHT: 157.63 LBS

## 2023-10-02 LAB
ANION GAP SERPL CALC-SCNC: 11 MMOL/L (ref 7–16)
BASOPHILS # BLD AUTO: 1 % (ref 0–1.8)
BASOPHILS # BLD: 0.05 K/UL (ref 0–0.12)
BUN SERPL-MCNC: 22 MG/DL (ref 8–22)
CALCIUM SERPL-MCNC: 8.9 MG/DL (ref 8.5–10.5)
CHLORIDE SERPL-SCNC: 109 MMOL/L (ref 96–112)
CO2 SERPL-SCNC: 22 MMOL/L (ref 20–33)
CREAT SERPL-MCNC: 1.24 MG/DL (ref 0.5–1.4)
EOSINOPHIL # BLD AUTO: 0.2 K/UL (ref 0–0.51)
EOSINOPHIL NFR BLD: 3.8 % (ref 0–6.9)
ERYTHROCYTE [DISTWIDTH] IN BLOOD BY AUTOMATED COUNT: 50.4 FL (ref 35.9–50)
GFR SERPLBLD CREATININE-BSD FMLA CKD-EPI: 51 ML/MIN/1.73 M 2
GLUCOSE BLD STRIP.AUTO-MCNC: 83 MG/DL (ref 65–99)
GLUCOSE SERPL-MCNC: 168 MG/DL (ref 65–99)
HCT VFR BLD AUTO: 33.7 % (ref 37–47)
HGB BLD-MCNC: 11.3 G/DL (ref 12–16)
IMM GRANULOCYTES # BLD AUTO: 0.02 K/UL (ref 0–0.11)
IMM GRANULOCYTES NFR BLD AUTO: 0.4 % (ref 0–0.9)
LYMPHOCYTES # BLD AUTO: 1.91 K/UL (ref 1–4.8)
LYMPHOCYTES NFR BLD: 36.5 % (ref 22–41)
MCH RBC QN AUTO: 33.6 PG (ref 27–33)
MCHC RBC AUTO-ENTMCNC: 33.5 G/DL (ref 32.2–35.5)
MCV RBC AUTO: 100.3 FL (ref 81.4–97.8)
MONOCYTES # BLD AUTO: 0.39 K/UL (ref 0–0.85)
MONOCYTES NFR BLD AUTO: 7.4 % (ref 0–13.4)
NEUTROPHILS # BLD AUTO: 2.67 K/UL (ref 1.82–7.42)
NEUTROPHILS NFR BLD: 50.9 % (ref 44–72)
NRBC # BLD AUTO: 0 K/UL
NRBC BLD-RTO: 0 /100 WBC (ref 0–0.2)
PLATELET # BLD AUTO: 212 K/UL (ref 164–446)
PMV BLD AUTO: 11.1 FL (ref 9–12.9)
POTASSIUM SERPL-SCNC: 4.6 MMOL/L (ref 3.6–5.5)
RBC # BLD AUTO: 3.36 M/UL (ref 4.2–5.4)
SODIUM SERPL-SCNC: 142 MMOL/L (ref 135–145)
WBC # BLD AUTO: 5.2 K/UL (ref 4.8–10.8)

## 2023-10-02 PROCEDURE — 85025 COMPLETE CBC W/AUTO DIFF WBC: CPT

## 2023-10-02 PROCEDURE — A9270 NON-COVERED ITEM OR SERVICE: HCPCS | Mod: UD | Performed by: INTERNAL MEDICINE

## 2023-10-02 PROCEDURE — 700102 HCHG RX REV CODE 250 W/ 637 OVERRIDE(OP): Mod: UD | Performed by: INTERNAL MEDICINE

## 2023-10-02 PROCEDURE — G0378 HOSPITAL OBSERVATION PER HR: HCPCS

## 2023-10-02 PROCEDURE — 700111 HCHG RX REV CODE 636 W/ 250 OVERRIDE (IP): Mod: UD | Performed by: INTERNAL MEDICINE

## 2023-10-02 PROCEDURE — 80048 BASIC METABOLIC PNL TOTAL CA: CPT

## 2023-10-02 PROCEDURE — 99239 HOSP IP/OBS DSCHRG MGMT >30: CPT | Performed by: INTERNAL MEDICINE

## 2023-10-02 PROCEDURE — 700105 HCHG RX REV CODE 258: Mod: UD | Performed by: INTERNAL MEDICINE

## 2023-10-02 PROCEDURE — 96372 THER/PROPH/DIAG INJ SC/IM: CPT

## 2023-10-02 PROCEDURE — 82962 GLUCOSE BLOOD TEST: CPT

## 2023-10-02 RX ORDER — CEPHALEXIN 500 MG/1
500 CAPSULE ORAL 4 TIMES DAILY
Qty: 20 CAPSULE | Refills: 0 | Status: SHIPPED | OUTPATIENT
Start: 2023-10-02 | End: 2023-10-07

## 2023-10-02 RX ADMIN — HYDROCODONE BITARTRATE AND ACETAMINOPHEN 2 TABLET: 10; 325 TABLET ORAL at 05:34

## 2023-10-02 RX ADMIN — HEPARIN SODIUM 5000 UNITS: 5000 INJECTION, SOLUTION INTRAVENOUS; SUBCUTANEOUS at 05:35

## 2023-10-02 RX ADMIN — SODIUM CHLORIDE, POTASSIUM CHLORIDE, SODIUM LACTATE AND CALCIUM CHLORIDE: 600; 310; 30; 20 INJECTION, SOLUTION INTRAVENOUS at 01:55

## 2023-10-02 RX ADMIN — CEPHALEXIN 500 MG: 500 CAPSULE ORAL at 05:34

## 2023-10-02 ASSESSMENT — LIFESTYLE VARIABLES
EVER HAD A DRINK FIRST THING IN THE MORNING TO STEADY YOUR NERVES TO GET RID OF A HANGOVER: NO
ALCOHOL_USE: NO
TOTAL SCORE: 0
HAVE YOU EVER FELT YOU SHOULD CUT DOWN ON YOUR DRINKING: NO
AVERAGE NUMBER OF DAYS PER WEEK YOU HAVE A DRINK CONTAINING ALCOHOL: 0
CONSUMPTION TOTAL: NEGATIVE
TOTAL SCORE: 0
ON A TYPICAL DAY WHEN YOU DRINK ALCOHOL HOW MANY DRINKS DO YOU HAVE: 0
TOTAL SCORE: 0
HAVE PEOPLE ANNOYED YOU BY CRITICIZING YOUR DRINKING: NO
EVER FELT BAD OR GUILTY ABOUT YOUR DRINKING: NO
HOW MANY TIMES IN THE PAST YEAR HAVE YOU HAD 5 OR MORE DRINKS IN A DAY: 0

## 2023-10-02 ASSESSMENT — PAIN DESCRIPTION - PAIN TYPE: TYPE: CHRONIC PAIN

## 2023-10-02 NOTE — DISCHARGE PLANNING
"Care Transition Team Assessment    Information Source  Who is responsible for making decisions for patient? : Patient    Readmission Evaluation  Is this a readmission?: No    Elopement Risk  Legal Hold: (P) No  Ambulatory or Self Mobile in Wheelchair: (P) No-Not an Elopement Risk    Interdisciplinary Discharge Planning  Lives with - Patient's Self Care Capacity: (P) Other (Comments), Friends (8 roommates)  Patient or legal guardian wants to designate a caregiver: (P) No  Support Systems: (P) Children, Friends / Neighbors, Parent  Housing / Facility: (P) 2 Story House  Durable Medical Equipment: (P) Not Applicable    Discharge Preparedness  What is your plan after discharge?: Home with help  What are your discharge supports?: Parent, Child  Prior Functional Level: Ambulatory, Independent with Activities of Daily Living, Independent with Medication Management    Functional Assesment  Prior Functional Level: Ambulatory, Independent with Activities of Daily Living, Independent with Medication Management    Finances  Financial Barriers to Discharge: No  Prescription Coverage: Yes    Vision / Hearing Impairment  Vision Impairment : (P) Yes  Right Eye Vision: Wears Glasses, Impaired  Left Eye Vision: Wears Glasses, Impaired  Hearing Impairment : (P) Yes  Hearing Impairment: (P) Right Ear, Other (Comments) (\"very low sound\" in right ear)  Does Pt Need Special Equipment for the Hearing Impaired?: (P) No    Advance Directive  Advance Directive?: None  Advance Directive offered?: AD Booklet refused    Domestic Abuse  Have you ever been the victim of abuse or violence?: (P) No  Physical Abuse or Sexual Abuse: (P) No  Verbal Abuse or Emotional Abuse: (P) No  Possible Abuse/Neglect Reported to:: (P) Not Applicable    Psychological Assessment  History of Substance Abuse: Amphetamines, Marijuana, Methamphetamine, Prescription opioids  Date Last Used - Amphetamines: positivie UDS on admission  Date Last Used - Marijuana: positive " UDS on admission  Date Last Used - Methamphetamine: positive UDS on admission  Date Last Used - Prescription Opioids: positive UDS on admission  Substance Abuse Comments: Patient reports that she has been smoking cigarettes. She has a 14.5 pack-year smoking history. She has never used smokeless tobacco. She reports that she does not drink alcohol and does not use drugs. However, had positive UDS on admission  History of Psychiatric Problems: Yes (Anxiety)  Non-compliant with Treatment: Yes  Newly Diagnosed Illness: No    Discharge Risks or Barriers  Discharge risks or barriers?: Substance abuse, Mental health, Complex medical needs, Non-adherence to medication or treatment  Patient risk factors: Complex medical needs, Noncompliance, Polypharmacy (>7 prescriptions), Substance abuse    Anticipated Discharge Information  Discharge Disposition: Discharged to home/self care (01)  Discharge Address: [address] [place] [address]  Discharge Contact Phone Number: [phone]    Case Management Discharge Planning    Admission Date: 10/1/2023  GMLOS:    ALOS: 0    6-Clicks ADL Score:    6-Clicks Mobility Score:        Anticipated Discharge Dispo: Discharge Disposition: Discharged to home/self care (01)  Discharge Address: [address] [place] [address]  Discharge Contact Phone Number: [phone]    DME Needed: No    Action(s) Taken: Updated Provider/Nurse on Discharge Plan    Escalations Completed: None    Medically Clear: Yes    Next Steps: Follow-up with the Attending and Bedside RN for any issues/changes and update the discharge plan accordingly.    Barriers to Discharge: None    Is the patient up for discharge tomorrow: No, patient is set to discharge home today.

## 2023-10-02 NOTE — CARE PLAN
Problem: Pain - Standard  Goal: Alleviation of pain or a reduction in pain to the patient’s comfort goal  Outcome: Progressing     Problem: Knowledge Deficit - Standard  Goal: Patient and family/care givers will demonstrate understanding of plan of care, disease process/condition, diagnostic tests and medications  Outcome: Progressing     Problem: Psychosocial  Goal: Patient's level of anxiety will decrease  Outcome: Progressing     Problem: Fluid Volume  Goal: Fluid volume balance will be maintained  Outcome: Progressing     Problem: Infection - Standard  Goal: Patient will remain free from infection  Outcome: Progressing     Problem: Wound/ / Incision Healing  Goal: Patient's wound/surgical incision will decrease in size and heals properly  Outcome: Progressing   The patient is Stable - Low risk of patient condition declining or worsening    Shift Goals  Clinical Goals: pain mgt, IVF  Patient Goals: rest  Family Goals: no family present    Progress made toward(s) clinical / shift goals:  improving    Patient is not progressing towards the following goals:

## 2023-10-02 NOTE — PROGRESS NOTES
Bedside report received. Assumed care of patient this morning. Assessment completed      Patient is A&O x 4, pt calls for assistance appropriately  Patient reports improvement of neuropathy pain  Pt is in room air   Mobility upself, steady gait   Voiding +  Flatus +    Plan of care reviewed with the patient. Bed is locked and in the lowest position. Call light is within reach. Patient encouraged to voice needs and concerns, all needs met at this time. Hourly rounding in place.

## 2023-10-02 NOTE — DISCHARGE SUMMARY
Discharge Summary    CHIEF COMPLAINT ON ADMISSION  Chief Complaint   Patient presents with    Wound Check     Burn to right wrist/hand       Reason for Admission  Wound check     Admission Date  10/1/2023    CODE STATUS  Full Code    HPI & HOSPITAL COURSE  Yuki Riddle is a 58 y.o. 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  Since then she noticed that the redness on her right wrist continued to worsen, and that the area has been hurting.  She had no fevers or chills.  She had no other complaints.  However since then, her blood sugars have been running high in the 500s.  Today, it was again in the 500s and she took a total of 31 units of insulin per sliding scale.  Otherwise she states that she thinks she has been eating and drinking adequately.  She denies any nausea, vomiting, diarrhea or abdominal pain.  However she has been and feeling unwell.  She decided to go to the ED.     On evaluation, vital signs were stable.   She had no leukocytosis.  Electrolytes were normal.  However creatinine was elevated at 2.06 from baseline of 0.8-1.1.  Blood glucose was also low at 49. Beta hydroxybutyrate was normal.  Venous pH was normal.  Alkaline phosphatase was 184 which is chronic.  Renal ultrasound showed no obstructive uropathy.  Patient was given IV fluids, dextrose, and tetanus shot.  She was started on oral Keflex.  She clinically improved.  Her creatinine improved back to baseline.  Her right wrist cellulitis also improved.  She remained hemodynamically stable and afebrile.  She had improved sense of wellbeing.    I have personally seen and examined the patient on the day of discharge. With her clinical improvement, she was deemed ready to discharge from the hospital as  she did not have any further hospitalization needs. Patient felt comfortable going home. The discharge plan was discussed with the patient, with which she was agreeable to.     Therefore, she is discharged in good and stable condition to home with close outpatient follow-up.      Discharge Date  10/2/2023      FOLLOW UP ITEMS POST DISCHARGE  -She will continue on Keflex for 5 days.  -Counseled to keep herself well-hydrated.  Will defer to endocrinology regarding ongoing treatment with NSAIDs for her insulin induced neuropathy.  -Follow-up with PCP.  - counseled to seek immediate medical attention, or return to the ED for recurrent or worsening symptoms.      DISCHARGE DIAGNOSES  Principal Problem:    JOQAUIN (acute kidney injury), likely prerenal and NSAID induced (HCC) (POA: Yes)  Active Problems:    Cellulitis of right wrist (POA: Yes)    COPD (chronic obstructive pulmonary disease) (HCC) (POA: Yes)    Type 1 diabetes mellitus with neuropathy, gastroparesis, and retinopathy (HCC) (POA: Yes)  Resolved Problems:    Tobacco dependence (POA: Yes)      FOLLOW UP  No future appointments.  No follow-up provider specified.    MEDICATIONS ON DISCHARGE     Medication List        CONTINUE taking these medications        Instructions   B-D ULTRAFINE III SHORT PEN  Generic drug: Insulin Pen Needle   Inject 1 Needle under the skin as needed (INSULIN INJECTIONS). USE AS DIRECTED  Dose: 1 Needle     buPROPion  MG Tb12 sustained-release tablet  Commonly known as: Wellbutrin-SR   Take 150 mg by mouth 2 times a day.  Dose: 150 mg     cephALEXin 500 MG Caps  Commonly known as: Keflex   Take 1 Capsule by mouth 4 times a day for 5 days.  Dose: 500 mg     diazepam 10 MG tablet  Commonly known as: Valium   Take 10 mg by mouth every 6 hours as needed for Anxiety.  Dose: 10 mg     gabapentin 600 MG tablet  Commonly known as: Neurontin   Take 600-1,200 mg by mouth 1 time a day as needed (NERVE PAIN). Indications: Neuropathic Pain  Dose:  "600-1,200 mg     GAS-X PO   Take 1 Tablet by mouth 1 time a day as needed (GASSINESS).  Dose: 1 Tablet     HumaLOG KwikPen 100 UNIT/ML Sopn injection PEN  Generic drug: insulin lispro   INJECT SC UP TO 30 UNITS PER DAY PER SLIDING SCALE.     HYDROcodone/acetaminophen  MG Tabs  Commonly known as: Norco   Take 2 Tablets by mouth every 6 hours as needed (PAIN). Indications: Moderate to Moderately Severe Pain  Dose: 2 Tablet     hydrOXYzine HCl 25 MG Tabs  Commonly known as: Atarax   Take 25 mg by mouth 2 times a day.  Dose: 25 mg     Naloxone 4 MG/0.1ML Liqd  Commonly known as: Narcan   CALL 911. SPR CONTENTS OF ONE SPRAYER (0.1ML) INTO ONE NOSTRIL. REPEAT IN 2-3 MIN IF SYMPTOMS OF OPIOID EMERGENCY PERSIST, ALTERNATE NOSTRILS     Non Formulary Request   Take 1 Capsule by mouth 1 time a day as needed (CONSTIPATION). \"[place] RADHA\" HERBAL LAXATIVE CONTAIN 8.5MG SENNOSIDES  Dose: 1 Capsule     OneTouch Ultra strip  Generic drug: glucose blood      OxyCONTIN 20 MG T12a  Generic drug: oxyCODONE CR   Take 20 mg by mouth in the morning, at noon, and at bedtime.  Dose: 20 mg     promethazine 25 MG Tabs  Commonly known as: Phenergan   Take 25 mg by mouth 2 times a day.  Dose: 25 mg     sennosides 8.6 MG Tabs  Commonly known as: Senokot   Take 8.6 mg by mouth 1 time a day as needed (CONSTIPATION).  Dose: 8.6 mg     Toujeo SoloStar 300 UNIT/ML Sopn  Generic drug: Insulin Glargine (1 Unit Dial)   Inject 27 Units under the skin at bedtime.  Dose: 27 Units              Allergies  Allergies   Allergen Reactions    Compazine      Aggressive behavior     Sulfa Drugs Itching and Rash     Blotchy spots on chest       DIET  Orders Placed This Encounter   Procedures    Diet Order Diet: Consistent CHO (Diabetic)     Standing Status:   Standing     Number of Occurrences:   1     Order Specific Question:   Diet:     Answer:   Consistent CHO (Diabetic) [4]       ACTIVITY  As tolerated.  Weight bearing as " tolerated    CONSULTATIONS  None    PROCEDURES  None    LABORATORY  Lab Results   Component Value Date    SODIUM 142 10/02/2023    POTASSIUM 4.6 10/02/2023    CHLORIDE 109 10/02/2023    CO2 22 10/02/2023    GLUCOSE 168 (H) 10/02/2023    BUN 22 10/02/2023    CREATININE 1.24 10/02/2023    CREATININE 0.8 12/13/2008    GLOMRATE 49 (L) 11/23/2022        Lab Results   Component Value Date    WBC 5.2 10/02/2023    HEMOGLOBIN 11.3 (L) 10/02/2023    HEMATOCRIT 33.7 (L) 10/02/2023    PLATELETCT 212 10/02/2023        Total time of the discharge process = 38 minutes.

## 2023-10-02 NOTE — PROGRESS NOTES
Discharge instructions given to patient; patient verbalizes understanding, all questions answered.  Copy of DC summary provided, signed copy in chart.  1 prescriptions electronically sent to pt's pharmacy/provided to patient, copies in chart.  Pt states personal belongings are in possession.  Pt escorted off unit by this RN without incident.  Patient IV was discontinued prior to arrival.

## 2023-10-02 NOTE — PROGRESS NOTES
PIV d/c tip was intact. Discharge lounge picked up patient. Patient calling friend to pick her up per patient.

## 2023-10-02 NOTE — DISCHARGE INSTRUCTIONS
Discharge Instructions    Discharged to home by car with relative. Discharged via wheelchair, hospital escort: Yes.  Special equipment needed: Not Applicable    Be sure to schedule a follow-up appointment with your primary care doctor or any specialists as instructed.     Discharge Plan:   Diet Plan: Discussed  Activity Level: Discussed  Confirmed Follow up Appointment: Patient to Call and Schedule Appointment  Confirmed Symptoms Management: Discussed  Medication Reconciliation Updated: Yes  Influenza Vaccine Indication: Patient Refuses    I understand that a diet low in cholesterol, fat, and sodium is recommended for good health. Unless I have been given specific instructions below for another diet, I accept this instruction as my diet prescription.   Other diet: diabetic diet     Special Instructions: None    -Is this patient being discharged with medication to prevent blood clots?  No    Is patient discharged on Warfarin / Coumadin?   No

## 2023-10-05 LAB — GAD65 AB SER IA-ACNC: >250 IU/ML (ref 0–5)

## 2023-11-06 NOTE — TELEPHONE ENCOUNTER
1. Caller Name: [phone] (home)                         Call Back Number: Yuki Riddle      How would the patient prefer to be contacted with a response: Phone call OK to leave a detailed message    Pt called and left long detailed message advising that she had lost her hearing in one ear. Pt stated she had a number of issues going and wanted advice. Called Pt, Pt advised that she had experinced covid illness as well a number of other issues, the primary complaint being sudden hearing loss in one ear, Pt advised that she has been using ear buds for a length of time and now was experincing sudden hearing loss, she complained of neck pain jaw pain as well as lower ear discomnfort. Pt stated that her blood sugars have been pretty much uncontrolled . Pt was promptly advised to seek medical attention via urgent care or emergency room. Pt state dshe would follow up at [facility] to have ear reveiwed. Pt was advsied numerous times that medical assistants can not give medical advise or make medical decisions as this is out side of scope of practice. Pt was advsied that Dr Gr has not seen her in over a year and that she would need to make an appointment to follow up on medical issues. Pt asked what she should be doing about her blood sugars as they have been in the range of approx 800. Pt stated that her insuling has not been working. Pt was advsied again that she should report to the urgent care or emergency room. Pt stated that her parents are not able to drive her until 2 days from the date of this message. Pt was advised that it was not wise or advisable to wait. Pt stated she would call for an appointment with her PCP as well as her other medical providers.     
English

## 2023-11-14 ENCOUNTER — APPOINTMENT (OUTPATIENT)
Dept: RADIOLOGY | Facility: MEDICAL CENTER | Age: 58
End: 2023-11-14
Attending: EMERGENCY MEDICINE
Payer: MEDICAID

## 2023-11-14 ENCOUNTER — HOSPITAL ENCOUNTER (EMERGENCY)
Facility: MEDICAL CENTER | Age: 58
End: 2023-11-14
Attending: EMERGENCY MEDICINE
Payer: MEDICAID

## 2023-11-14 VITALS
DIASTOLIC BLOOD PRESSURE: 81 MMHG | RESPIRATION RATE: 17 BRPM | WEIGHT: 155.65 LBS | TEMPERATURE: 97 F | SYSTOLIC BLOOD PRESSURE: 168 MMHG | HEART RATE: 95 BPM | BODY MASS INDEX: 25.12 KG/M2 | OXYGEN SATURATION: 98 %

## 2023-11-14 DIAGNOSIS — G44.221 CHRONIC TENSION-TYPE HEADACHE, INTRACTABLE: ICD-10-CM

## 2023-11-14 DIAGNOSIS — M79.89 RIGHT LEG SWELLING: ICD-10-CM

## 2023-11-14 DIAGNOSIS — M79.604 RIGHT LEG PAIN: ICD-10-CM

## 2023-11-14 DIAGNOSIS — R21 RASH: ICD-10-CM

## 2023-11-14 LAB
ALBUMIN SERPL BCP-MCNC: 4.6 G/DL (ref 3.2–4.9)
ALBUMIN/GLOB SERPL: 1.9 G/DL
ALP SERPL-CCNC: 212 U/L (ref 30–99)
ALT SERPL-CCNC: 46 U/L (ref 2–50)
ANION GAP SERPL CALC-SCNC: 11 MMOL/L (ref 7–16)
AST SERPL-CCNC: 23 U/L (ref 12–45)
BASOPHILS # BLD AUTO: 1.3 % (ref 0–1.8)
BASOPHILS # BLD: 0.06 K/UL (ref 0–0.12)
BILIRUB SERPL-MCNC: 0.3 MG/DL (ref 0.1–1.5)
BUN SERPL-MCNC: 20 MG/DL (ref 8–22)
CALCIUM ALBUM COR SERPL-MCNC: 9 MG/DL (ref 8.5–10.5)
CALCIUM SERPL-MCNC: 9.5 MG/DL (ref 8.5–10.5)
CHLORIDE SERPL-SCNC: 104 MMOL/L (ref 96–112)
CO2 SERPL-SCNC: 22 MMOL/L (ref 20–33)
CREAT SERPL-MCNC: 1.14 MG/DL (ref 0.5–1.4)
CRP SERPL HS-MCNC: 0.5 MG/L (ref 0–3)
EOSINOPHIL # BLD AUTO: 0.11 K/UL (ref 0–0.51)
EOSINOPHIL NFR BLD: 2.4 % (ref 0–6.9)
ERYTHROCYTE [DISTWIDTH] IN BLOOD BY AUTOMATED COUNT: 45.6 FL (ref 35.9–50)
ERYTHROCYTE [SEDIMENTATION RATE] IN BLOOD BY WESTERGREN METHOD: 24 MM/HOUR (ref 0–25)
GFR SERPLBLD CREATININE-BSD FMLA CKD-EPI: 56 ML/MIN/1.73 M 2
GLOBULIN SER CALC-MCNC: 2.4 G/DL (ref 1.9–3.5)
GLUCOSE SERPL-MCNC: 174 MG/DL (ref 65–99)
HCT VFR BLD AUTO: 33.7 % (ref 37–47)
HGB BLD-MCNC: 11.7 G/DL (ref 12–16)
IMM GRANULOCYTES # BLD AUTO: 0.01 K/UL (ref 0–0.11)
IMM GRANULOCYTES NFR BLD AUTO: 0.2 % (ref 0–0.9)
LYMPHOCYTES # BLD AUTO: 1.45 K/UL (ref 1–4.8)
LYMPHOCYTES NFR BLD: 31.5 % (ref 22–41)
MCH RBC QN AUTO: 32.4 PG (ref 27–33)
MCHC RBC AUTO-ENTMCNC: 34.7 G/DL (ref 32.2–35.5)
MCV RBC AUTO: 93.4 FL (ref 81.4–97.8)
MONOCYTES # BLD AUTO: 0.31 K/UL (ref 0–0.85)
MONOCYTES NFR BLD AUTO: 6.7 % (ref 0–13.4)
NEUTROPHILS # BLD AUTO: 2.67 K/UL (ref 1.82–7.42)
NEUTROPHILS NFR BLD: 57.9 % (ref 44–72)
NRBC # BLD AUTO: 0 K/UL
NRBC BLD-RTO: 0 /100 WBC (ref 0–0.2)
PLATELET # BLD AUTO: 225 K/UL (ref 164–446)
PMV BLD AUTO: 11.1 FL (ref 9–12.9)
POTASSIUM SERPL-SCNC: 4.1 MMOL/L (ref 3.6–5.5)
PROT SERPL-MCNC: 7 G/DL (ref 6–8.2)
RBC # BLD AUTO: 3.61 M/UL (ref 4.2–5.4)
SODIUM SERPL-SCNC: 137 MMOL/L (ref 135–145)
WBC # BLD AUTO: 4.6 K/UL (ref 4.8–10.8)

## 2023-11-14 PROCEDURE — 80053 COMPREHEN METABOLIC PANEL: CPT

## 2023-11-14 PROCEDURE — 73590 X-RAY EXAM OF LOWER LEG: CPT | Mod: RT

## 2023-11-14 PROCEDURE — 36415 COLL VENOUS BLD VENIPUNCTURE: CPT

## 2023-11-14 PROCEDURE — 96374 THER/PROPH/DIAG INJ IV PUSH: CPT

## 2023-11-14 PROCEDURE — 96375 TX/PRO/DX INJ NEW DRUG ADDON: CPT

## 2023-11-14 PROCEDURE — 700111 HCHG RX REV CODE 636 W/ 250 OVERRIDE (IP): Mod: JZ,UD | Performed by: EMERGENCY MEDICINE

## 2023-11-14 PROCEDURE — 86141 C-REACTIVE PROTEIN HS: CPT

## 2023-11-14 PROCEDURE — 85025 COMPLETE CBC W/AUTO DIFF WBC: CPT

## 2023-11-14 PROCEDURE — 93971 EXTREMITY STUDY: CPT | Mod: RT

## 2023-11-14 PROCEDURE — 85652 RBC SED RATE AUTOMATED: CPT

## 2023-11-14 PROCEDURE — 99284 EMERGENCY DEPT VISIT MOD MDM: CPT

## 2023-11-14 PROCEDURE — 70450 CT HEAD/BRAIN W/O DYE: CPT

## 2023-11-14 RX ORDER — MORPHINE SULFATE 4 MG/ML
4 INJECTION INTRAVENOUS ONCE
Status: COMPLETED | OUTPATIENT
Start: 2023-11-14 | End: 2023-11-14

## 2023-11-14 RX ORDER — ONDANSETRON 2 MG/ML
4 INJECTION INTRAMUSCULAR; INTRAVENOUS ONCE
Status: COMPLETED | OUTPATIENT
Start: 2023-11-14 | End: 2023-11-14

## 2023-11-14 RX ORDER — TRIAMCINOLONE ACETONIDE 1 MG/G
CREAM TOPICAL
Qty: 15 G | Refills: 0 | Status: SHIPPED | OUTPATIENT
Start: 2023-11-14 | End: 2023-11-14 | Stop reason: SDUPTHER

## 2023-11-14 RX ORDER — TRIAMCINOLONE ACETONIDE 1 MG/G
CREAM TOPICAL
Qty: 15 G | Refills: 0 | Status: SHIPPED | OUTPATIENT
Start: 2023-11-14

## 2023-11-14 RX ADMIN — MORPHINE SULFATE 4 MG: 4 INJECTION, SOLUTION INTRAMUSCULAR; INTRAVENOUS at 19:40

## 2023-11-14 RX ADMIN — ONDANSETRON 4 MG: 2 INJECTION INTRAMUSCULAR; INTRAVENOUS at 19:41

## 2023-11-14 ASSESSMENT — PAIN DESCRIPTION - PAIN TYPE: TYPE: ACUTE PAIN

## 2023-11-14 ASSESSMENT — FIBROSIS 4 INDEX: FIB4 SCORE: 1.37

## 2023-11-15 PROCEDURE — 93971 EXTREMITY STUDY: CPT | Mod: 26,RT | Performed by: INTERNAL MEDICINE

## 2023-11-15 NOTE — ED TRIAGE NOTES
Chief Complaint   Patient presents with    Abrasion     RLE. Redness noted to RLE.     Headache     X 3wks.     Nausea     W/ chills and febrile per pt.         BP (!) 131/91   Pulse 97   Temp 36.3 °C (97.4 °F) (Temporal)   Resp 18   LMP 01/01/2002   SpO2 97%

## 2023-11-15 NOTE — ED NOTES
Bedside report from [facility]. Pt on gurney in lowest, locked position, on RA, and continuous pulse ox monitoring. Fall precautions in place, including fall risk sign. Pt updated on plan of care. No needs at this time. Call light within reach.

## 2023-11-15 NOTE — ED NOTES
Pt provided discharge instructions, and prescription. Pt verbalized understanding of all instructions. IV removed, cathlon intact, site without s/s of infection. Pt ambulatory to lobby w/ steady gait.

## 2023-11-15 NOTE — ED PROVIDER NOTES
"ED Provider Note    CHIEF COMPLAINT  Chief Complaint   Patient presents with    Abrasion     RLE. Redness noted to RLE.     Headache     X 3wks.     Nausea     W/ chills and febrile per pt.        EXTERNAL RECORDS REVIEWED  Outpatient labs & studies April 2023 outpatient tibia-fibula x-rays were read as healing fracture as expected    HPI/ROS  LIMITATION TO HISTORY   Select: : None  OUTSIDE HISTORIAN(S):  None    Yuki Riddle is a 58 y.o. female who presents with 2 main complaints.  The first is a frontal headache bilateral, radiating posterior with associated photophobia.  She states it is present all day every day for the past 3 weeks, no associated trauma.  No acute numbness weakness to extremities.  Patient does have history of diabetic neuropathy however this is unchanged.  She states pain is different than her trigeminal neuralgia.  She has had associated nausea, no vomiting.  No acute neck stiffness    Second complaint is itching swelling and pain lower leg anterior tibial region just above the ankle.  She has noticed over the past 3 days patch of redness with a \"stringer line\".  She states the area is pruritic, and has been rubbing her heel on the area to scratch it.  She stated she spoke to her orthopedist over the phone and was referred to the emergency department for evaluation.  Last March she had repair of broken fibula by Dr. Wiggins.  No new trauma.  No fever or chills.    PAST MEDICAL HISTORY   has a past medical history of Arthritis, Cataract, COPD (chronic obstructive pulmonary disease) ([facility]), Diabetes, Diabetic neuropathy ([facility]), Diabetic retinopathy, Diabetic retinopathy ([facility]), Fibromyalgia, Hypertension, Migraine, POTS (postural orthostatic tachycardia syndrome), Recurrent UTI, Rheumatoid arthritis ([facility]), Rheumatoid arthritis ([facility]), Sleep apnea, and TIA (transient ischemic attack).    SURGICAL HISTORY   has a past surgical history that includes tonsillectomy; cholecystectomy; primary c " section; other; foreign body removal (6/18/2013); gastroscopy with balloon dilatation (2/13/2015); and gastroscopy with biopsy (2/13/2015).    FAMILY HISTORY  Family History   Problem Relation Age of Onset    No Known Problems Sister     No Known Problems Brother     No Known Problems Brother     No Known Problems Daughter     No Known Problems Daughter        SOCIAL HISTORY  Social History     Tobacco Use    Smoking status: Former     Current packs/day: 0.50     Average packs/day: 0.5 packs/day for 29.0 years (14.5 ttl pk-yrs)     Types: Cigarettes    Smokeless tobacco: Never    Tobacco comments:     15-18 cigarettes a day    Substance and Sexual Activity    Alcohol use: No    Drug use: No    Sexual activity: Not on file       CURRENT MEDICATIONS  Home Medications    **Home medications have not yet been reviewed for this encounter**         ALLERGIES  Allergies   Allergen Reactions    Compazine      Aggressive behavior     Sulfa Drugs Itching and Rash     Blotchy spots on chest       PHYSICAL EXAM  VITAL SIGNS: BP (!) 131/91   Pulse 97   Temp 36.3 °C (97.4 °F) (Temporal)   Resp 18   Wt 70.6 kg (155 lb 10.3 oz)   LMP 01/01/2002   SpO2 97%   BMI 25.12 kg/m²    HEENT: No facial swelling, no head trauma  Eyes: Pupils are equal  Neurologic: Diminished sensation both feet, strength equal.  Speech is clear.  Facial expression symmetric  GI: Abdomen is soft and nontender  Cardiac: Regular rate and rhythm  Respiratory: Clear lung sounds  Skin: Linear abrasions/excoriation several areas of shoulders and arms, no cellulitic change.  Right anterior lower extremity shows area of erythematous change, appearance of either rash or local dermatitis, seems less likely cellulitis.  The right lower leg is slightly swollen compared to the left      DIAGNOSTIC STUDIES / PROCEDURES      LABS  Results for orders placed or performed during the hospital encounter of 11/14/23   CBC WITH DIFFERENTIAL   Result Value Ref Range    WBC 4.6  (L) 4.8 - 10.8 K/uL    RBC 3.61 (L) 4.20 - 5.40 M/uL    Hemoglobin 11.7 (L) 12.0 - 16.0 g/dL    Hematocrit 33.7 (L) 37.0 - 47.0 %    MCV 93.4 81.4 - 97.8 fL    MCH 32.4 27.0 - 33.0 pg    MCHC 34.7 32.2 - 35.5 g/dL    RDW 45.6 35.9 - 50.0 fL    Platelet Count 225 164 - 446 K/uL    MPV 11.1 9.0 - 12.9 fL    Neutrophils-Polys 57.90 44.00 - 72.00 %    Lymphocytes 31.50 22.00 - 41.00 %    Monocytes 6.70 0.00 - 13.40 %    Eosinophils 2.40 0.00 - 6.90 %    Basophils 1.30 0.00 - 1.80 %    Immature Granulocytes 0.20 0.00 - 0.90 %    Nucleated RBC 0.00 0.00 - 0.20 /100 WBC    Neutrophils (Absolute) 2.67 1.82 - 7.42 K/uL    Lymphs (Absolute) 1.45 1.00 - 4.80 K/uL    Monos (Absolute) 0.31 0.00 - 0.85 K/uL    Eos (Absolute) 0.11 0.00 - 0.51 K/uL    Baso (Absolute) 0.06 0.00 - 0.12 K/uL    Immature Granulocytes (abs) 0.01 0.00 - 0.11 K/uL    NRBC (Absolute) 0.00 K/uL   COMP METABOLIC PANEL   Result Value Ref Range    Sodium 137 135 - 145 mmol/L    Potassium 4.1 3.6 - 5.5 mmol/L    Chloride 104 96 - 112 mmol/L    Co2 22 20 - 33 mmol/L    Anion Gap 11.0 7.0 - 16.0    Glucose 174 (H) 65 - 99 mg/dL    Bun 20 8 - 22 mg/dL    Creatinine 1.14 0.50 - 1.40 mg/dL    Calcium 9.5 8.5 - 10.5 mg/dL    Correct Calcium 9.0 8.5 - 10.5 mg/dL    AST(SGOT) 23 12 - 45 U/L    ALT(SGPT) 46 2 - 50 U/L    Alkaline Phosphatase 212 (H) 30 - 99 U/L    Total Bilirubin 0.3 0.1 - 1.5 mg/dL    Albumin 4.6 3.2 - 4.9 g/dL    Total Protein 7.0 6.0 - 8.2 g/dL    Globulin 2.4 1.9 - 3.5 g/dL    A-G Ratio 1.9 g/dL   CRP HIGH SENSITIVE (CARDIAC)   Result Value Ref Range    C Reactive Protein High Sensitive 0.5 0.0 - 3.0 mg/L   ESTIMATED GFR   Result Value Ref Range    GFR (CKD-EPI) 56 (A) >60 mL/min/1.73 m 2         RADIOLOGY  I have independently interpreted the diagnostic imaging associated with this visit and am waiting the final reading from the radiologist.   My preliminary interpretation is as follows: Right tibia-fibula x-ray negative for acute  fracture  Radiologist interpretation:   US-EXTREMITY VENOUS LOWER UNILAT RIGHT         DX-TIBIA AND FIBULA RIGHT   Final Result      No radiographic evidence of acute traumatic injury.      CT-HEAD W/O   Final Result      Head CT without contrast within normal limits. No evidence of acute cerebral infarction, hemorrhage or mass lesion.           Ultrasound negative for DVT right leg    COURSE & MEDICAL DECISION MAKING    ED Observation Status? No; Patient does not meet criteria for ED Observation.     INITIAL ASSESSMENT, COURSE AND PLAN  Care Narrative: Patient presents with complaints of chronic headache over the last month, no acute neurologic findings however.  CT scan of the head was obtained to rule out mass, hydrocephalus, hemorrhage of which this was a negative study.  Patient's pain improved with a dose of morphine.  She has her own medications for pain she takes chronically.  CRP and white blood cell count returned normal, patient is afebrile, no evidence of cellulitis, unlikely to be osteomyelitis.  The x-ray of the right lower leg showed no changes of osteomyelitis, no evidence of acute trauma.  With slight swelling right leg compared to left, unexplained distal leg symptoms including discomfort, ultrasound was obtained which was negative for DVT.  The appearance of the area which the patient describes as pruritic appears to be excoriated, she admits to rubbing it with the back of her heel to help the itching.  It also has appearance of contact dermatitis.  Patient has been prescribed Kenalog cream for local pruritus control and is advised to follow-up with her primary doctor for recheck soon as possible.  HTN/IDDM FOLLOW UP:  The patient has known hypertension and is being followed by their primary care doctor      ADDITIONAL PROBLEM LIST  Patient states history of renal insufficiency: Creatinine today is normal at 1.1    Hyperglycemia: No evidence of acidosis, patient encouraged to continue her diabetic  medication as prescribed    DISPOSITION AND DISCUSSIONS    Escalation of care considered, and ultimately not performed:acute inpatient care management, however at this time, the patient is most appropriate for outpatient management    Barriers to care at this time, including but not limited to:  None .     Decision tools and prescription drugs considered including, but not limited to: Medication modification no change in the patient's medications, she is to continue her diabetic and pain medication as prescribed .    FINAL DIAGNOSIS  1. Chronic tension-type headache, intractable    2. Right leg swelling    3. Rash    4. Right leg pain           Electronically signed by: Joseph Wang M.D., 11/14/2023 7:23 PM

## 2023-11-15 NOTE — DISCHARGE INSTRUCTIONS
Follow-up with your primary doctor for recheck as soon as possible.  Return for fever, spreading redness, or any concerns.

## 2024-01-28 ENCOUNTER — HOSPITAL ENCOUNTER (EMERGENCY)
Facility: MEDICAL CENTER | Age: 59
End: 2024-01-29
Attending: EMERGENCY MEDICINE
Payer: MEDICAID

## 2024-01-28 ENCOUNTER — APPOINTMENT (OUTPATIENT)
Dept: RADIOLOGY | Facility: MEDICAL CENTER | Age: 59
End: 2024-01-28
Attending: EMERGENCY MEDICINE
Payer: MEDICAID

## 2024-01-28 DIAGNOSIS — R73.9 HYPERGLYCEMIA: ICD-10-CM

## 2024-01-28 DIAGNOSIS — J10.1 INFLUENZA A: ICD-10-CM

## 2024-01-28 DIAGNOSIS — R11.0 NAUSEA: ICD-10-CM

## 2024-01-28 DIAGNOSIS — M79.10 MYALGIA: ICD-10-CM

## 2024-01-28 DIAGNOSIS — B34.9 VIRAL SYNDROME: ICD-10-CM

## 2024-01-28 LAB
ALBUMIN SERPL BCP-MCNC: 3.9 G/DL (ref 3.2–4.9)
ALBUMIN/GLOB SERPL: 1.4 G/DL
ALP SERPL-CCNC: 167 U/L (ref 30–99)
ALT SERPL-CCNC: 101 U/L (ref 2–50)
ANION GAP SERPL CALC-SCNC: 12 MMOL/L (ref 7–16)
APPEARANCE UR: CLEAR
AST SERPL-CCNC: 113 U/L (ref 12–45)
BACTERIA #/AREA URNS HPF: NEGATIVE /HPF
BASE EXCESS BLDV CALC-SCNC: -2 MMOL/L
BASOPHILS # BLD AUTO: 0.5 % (ref 0–1.8)
BASOPHILS # BLD: 0.02 K/UL (ref 0–0.12)
BILIRUB SERPL-MCNC: 0.5 MG/DL (ref 0.1–1.5)
BILIRUB UR QL STRIP.AUTO: NEGATIVE
BODY TEMPERATURE: 38.9 CENTIGRADE
BUN SERPL-MCNC: 17 MG/DL (ref 8–22)
CALCIUM ALBUM COR SERPL-MCNC: 9 MG/DL (ref 8.5–10.5)
CALCIUM SERPL-MCNC: 8.9 MG/DL (ref 8.5–10.5)
CHLORIDE SERPL-SCNC: 101 MMOL/L (ref 96–112)
CO2 SERPL-SCNC: 23 MMOL/L (ref 20–33)
COLOR UR: YELLOW
CREAT SERPL-MCNC: 1.14 MG/DL (ref 0.5–1.4)
EOSINOPHIL # BLD AUTO: 0 K/UL (ref 0–0.51)
EOSINOPHIL NFR BLD: 0 % (ref 0–6.9)
EPI CELLS #/AREA URNS HPF: NEGATIVE /HPF
ERYTHROCYTE [DISTWIDTH] IN BLOOD BY AUTOMATED COUNT: 46.9 FL (ref 35.9–50)
GFR SERPLBLD CREATININE-BSD FMLA CKD-EPI: 56 ML/MIN/1.73 M 2
GLOBULIN SER CALC-MCNC: 2.7 G/DL (ref 1.9–3.5)
GLUCOSE SERPL-MCNC: 367 MG/DL (ref 65–99)
GLUCOSE UR STRIP.AUTO-MCNC: >=1000 MG/DL
HCO3 BLDV-SCNC: 21 MMOL/L (ref 24–28)
HCT VFR BLD AUTO: 36.1 % (ref 37–47)
HGB BLD-MCNC: 12.4 G/DL (ref 12–16)
HYALINE CASTS #/AREA URNS LPF: ABNORMAL /LPF
IMM GRANULOCYTES # BLD AUTO: 0.02 K/UL (ref 0–0.11)
IMM GRANULOCYTES NFR BLD AUTO: 0.5 % (ref 0–0.9)
INHALED O2 FLOW RATE: ABNORMAL L/MIN
KETONES UR STRIP.AUTO-MCNC: 15 MG/DL
LACTATE SERPL-SCNC: 1.5 MMOL/L (ref 0.5–2)
LEUKOCYTE ESTERASE UR QL STRIP.AUTO: ABNORMAL
LYMPHOCYTES # BLD AUTO: 0.2 K/UL (ref 1–4.8)
LYMPHOCYTES NFR BLD: 5.3 % (ref 22–41)
MCH RBC QN AUTO: 32.4 PG (ref 27–33)
MCHC RBC AUTO-ENTMCNC: 34.3 G/DL (ref 32.2–35.5)
MCV RBC AUTO: 94.3 FL (ref 81.4–97.8)
MICRO URNS: ABNORMAL
MONOCYTES # BLD AUTO: 0.28 K/UL (ref 0–0.85)
MONOCYTES NFR BLD AUTO: 7.4 % (ref 0–13.4)
NEUTROPHILS # BLD AUTO: 3.26 K/UL (ref 1.82–7.42)
NEUTROPHILS NFR BLD: 86.3 % (ref 44–72)
NITRITE UR QL STRIP.AUTO: NEGATIVE
NRBC # BLD AUTO: 0 K/UL
NRBC BLD-RTO: 0 /100 WBC (ref 0–0.2)
PCO2 BLDV: 32 MMHG (ref 41–51)
PCO2 TEMP ADJ BLDV: 34.8 MMHG (ref 41–51)
PH BLDV: 7.44 [PH] (ref 7.31–7.45)
PH TEMP ADJ BLDV: 7.41 [PH] (ref 7.31–7.45)
PH UR STRIP.AUTO: 5.5 [PH] (ref 5–8)
PLATELET # BLD AUTO: 170 K/UL (ref 164–446)
PMV BLD AUTO: 11.3 FL (ref 9–12.9)
PO2 BLDV: 35.8 MMHG (ref 25–40)
PO2 TEMP ADJ BLDV: 41 MMHG (ref 25–40)
POTASSIUM SERPL-SCNC: 4 MMOL/L (ref 3.6–5.5)
PROT SERPL-MCNC: 6.6 G/DL (ref 6–8.2)
PROT UR QL STRIP: 300 MG/DL
RBC # BLD AUTO: 3.83 M/UL (ref 4.2–5.4)
RBC # URNS HPF: ABNORMAL /HPF
RBC UR QL AUTO: ABNORMAL
SAO2 % BLDV: 70.7 %
SODIUM SERPL-SCNC: 136 MMOL/L (ref 135–145)
SP GR UR STRIP.AUTO: 1.03
UROBILINOGEN UR STRIP.AUTO-MCNC: 0.2 MG/DL
WBC # BLD AUTO: 3.8 K/UL (ref 4.8–10.8)
WBC #/AREA URNS HPF: ABNORMAL /HPF

## 2024-01-28 PROCEDURE — 83605 ASSAY OF LACTIC ACID: CPT

## 2024-01-28 PROCEDURE — 81001 URINALYSIS AUTO W/SCOPE: CPT

## 2024-01-28 PROCEDURE — 0241U HCHG SARS-COV-2 COVID-19 NFCT DS RESP RNA 4 TRGT ED POC: CPT

## 2024-01-28 PROCEDURE — 85025 COMPLETE CBC W/AUTO DIFF WBC: CPT

## 2024-01-28 PROCEDURE — 700105 HCHG RX REV CODE 258: Mod: UD | Performed by: EMERGENCY MEDICINE

## 2024-01-28 PROCEDURE — 80053 COMPREHEN METABOLIC PANEL: CPT

## 2024-01-28 PROCEDURE — 71045 X-RAY EXAM CHEST 1 VIEW: CPT

## 2024-01-28 PROCEDURE — 700102 HCHG RX REV CODE 250 W/ 637 OVERRIDE(OP): Mod: UD | Performed by: EMERGENCY MEDICINE

## 2024-01-28 PROCEDURE — 96375 TX/PRO/DX INJ NEW DRUG ADDON: CPT

## 2024-01-28 PROCEDURE — 99284 EMERGENCY DEPT VISIT MOD MDM: CPT

## 2024-01-28 PROCEDURE — 82803 BLOOD GASES ANY COMBINATION: CPT

## 2024-01-28 PROCEDURE — 700111 HCHG RX REV CODE 636 W/ 250 OVERRIDE (IP): Performed by: EMERGENCY MEDICINE

## 2024-01-28 PROCEDURE — A9270 NON-COVERED ITEM OR SERVICE: HCPCS | Mod: UD | Performed by: EMERGENCY MEDICINE

## 2024-01-28 PROCEDURE — 96374 THER/PROPH/DIAG INJ IV PUSH: CPT

## 2024-01-28 PROCEDURE — 36415 COLL VENOUS BLD VENIPUNCTURE: CPT

## 2024-01-28 RX ORDER — ACETAMINOPHEN 500 MG
1000 TABLET ORAL ONCE
Status: COMPLETED | OUTPATIENT
Start: 2024-01-28 | End: 2024-01-28

## 2024-01-28 RX ORDER — SODIUM CHLORIDE, SODIUM LACTATE, POTASSIUM CHLORIDE, CALCIUM CHLORIDE 600; 310; 30; 20 MG/100ML; MG/100ML; MG/100ML; MG/100ML
1000 INJECTION, SOLUTION INTRAVENOUS ONCE
Status: COMPLETED | OUTPATIENT
Start: 2024-01-28 | End: 2024-01-29

## 2024-01-28 RX ORDER — KETOROLAC TROMETHAMINE 15 MG/ML
15 INJECTION, SOLUTION INTRAMUSCULAR; INTRAVENOUS ONCE
Status: COMPLETED | OUTPATIENT
Start: 2024-01-28 | End: 2024-01-28

## 2024-01-28 RX ADMIN — SODIUM CHLORIDE, POTASSIUM CHLORIDE, SODIUM LACTATE AND CALCIUM CHLORIDE 1000 ML: 600; 310; 30; 20 INJECTION, SOLUTION INTRAVENOUS at 23:10

## 2024-01-28 RX ADMIN — KETOROLAC TROMETHAMINE 15 MG: 15 INJECTION, SOLUTION INTRAMUSCULAR; INTRAVENOUS at 23:08

## 2024-01-28 RX ADMIN — ACETAMINOPHEN 1000 MG: 500 TABLET ORAL at 23:17

## 2024-01-28 ASSESSMENT — FIBROSIS 4 INDEX: FIB4 SCORE: 1.12

## 2024-01-28 ASSESSMENT — PAIN DESCRIPTION - PAIN TYPE: TYPE: ACUTE PAIN

## 2024-01-29 VITALS
BODY MASS INDEX: 26.66 KG/M2 | TEMPERATURE: 99.7 F | OXYGEN SATURATION: 94 % | HEIGHT: 65 IN | SYSTOLIC BLOOD PRESSURE: 170 MMHG | HEART RATE: 81 BPM | RESPIRATION RATE: 23 BRPM | DIASTOLIC BLOOD PRESSURE: 79 MMHG | WEIGHT: 160 LBS

## 2024-01-29 LAB
FLUAV RNA SPEC QL NAA+PROBE: POSITIVE
FLUBV RNA SPEC QL NAA+PROBE: NEGATIVE
GLUCOSE BLD STRIP.AUTO-MCNC: 253 MG/DL (ref 65–99)
GLUCOSE BLD STRIP.AUTO-MCNC: 365 MG/DL (ref 65–99)
RSV RNA SPEC QL NAA+PROBE: NEGATIVE
SARS-COV-2 RNA RESP QL NAA+PROBE: NOTDETECTED

## 2024-01-29 PROCEDURE — 700105 HCHG RX REV CODE 258: Mod: UD | Performed by: EMERGENCY MEDICINE

## 2024-01-29 PROCEDURE — 82962 GLUCOSE BLOOD TEST: CPT | Mod: 91

## 2024-01-29 PROCEDURE — A9270 NON-COVERED ITEM OR SERVICE: HCPCS | Mod: UD | Performed by: EMERGENCY MEDICINE

## 2024-01-29 PROCEDURE — 700102 HCHG RX REV CODE 250 W/ 637 OVERRIDE(OP): Mod: UD | Performed by: EMERGENCY MEDICINE

## 2024-01-29 RX ORDER — OSELTAMIVIR PHOSPHATE 30 MG/1
30 CAPSULE ORAL 2 TIMES DAILY
Qty: 20 CAPSULE | Refills: 0 | Status: ACTIVE | OUTPATIENT
Start: 2024-01-29 | End: 2024-02-08

## 2024-01-29 RX ORDER — ONDANSETRON 4 MG/1
4 TABLET, ORALLY DISINTEGRATING ORAL EVERY 6 HOURS PRN
Qty: 10 TABLET | Refills: 0 | Status: SHIPPED | OUTPATIENT
Start: 2024-01-29

## 2024-01-29 RX ORDER — SODIUM CHLORIDE, SODIUM LACTATE, POTASSIUM CHLORIDE, CALCIUM CHLORIDE 600; 310; 30; 20 MG/100ML; MG/100ML; MG/100ML; MG/100ML
1000 INJECTION, SOLUTION INTRAVENOUS ONCE
Status: COMPLETED | OUTPATIENT
Start: 2024-01-29 | End: 2024-01-29

## 2024-01-29 RX ORDER — HYDROXYZINE HYDROCHLORIDE 25 MG/1
25 TABLET, FILM COATED ORAL ONCE
Status: COMPLETED | OUTPATIENT
Start: 2024-01-29 | End: 2024-01-29

## 2024-01-29 RX ORDER — NAPROXEN 500 MG/1
500 TABLET ORAL 2 TIMES DAILY WITH MEALS
Qty: 20 TABLET | Refills: 0 | Status: SHIPPED | OUTPATIENT
Start: 2024-01-29 | End: 2024-02-08

## 2024-01-29 RX ORDER — OSELTAMIVIR PHOSPHATE 75 MG/1
75 CAPSULE ORAL 2 TIMES DAILY
Qty: 10 CAPSULE | Refills: 0 | Status: SHIPPED | OUTPATIENT
Start: 2024-01-29 | End: 2024-01-29

## 2024-01-29 RX ORDER — LORAZEPAM 1 MG/1
1 TABLET ORAL ONCE
Status: COMPLETED | OUTPATIENT
Start: 2024-01-29 | End: 2024-01-29

## 2024-01-29 RX ORDER — OSELTAMIVIR PHOSPHATE 75 MG/1
75 CAPSULE ORAL ONCE
Status: COMPLETED | OUTPATIENT
Start: 2024-01-29 | End: 2024-01-29

## 2024-01-29 RX ADMIN — HYDROXYZINE HYDROCHLORIDE 25 MG: 25 TABLET, FILM COATED ORAL at 00:52

## 2024-01-29 RX ADMIN — SODIUM CHLORIDE, POTASSIUM CHLORIDE, SODIUM LACTATE AND CALCIUM CHLORIDE 1000 ML: 600; 310; 30; 20 INJECTION, SOLUTION INTRAVENOUS at 00:54

## 2024-01-29 RX ADMIN — OSELTAMIVIR PHOSPHATE 75 MG: 75 CAPSULE ORAL at 02:55

## 2024-01-29 RX ADMIN — INSULIN HUMAN 10 UNITS: 100 INJECTION, SOLUTION PARENTERAL at 00:46

## 2024-01-29 RX ADMIN — LORAZEPAM 1 MG: 1 TABLET ORAL at 00:52

## 2024-01-29 NOTE — ED NOTES
Pt medicated with insulin and anti anxiety medications per MAR. Pt provided with water. Pt denies any further needs at this time. Symmetrical chest rise and fall noted. NAD noted. VSS. Call light within reach

## 2024-01-29 NOTE — ED NOTES
Pt assisted back to bed by this RN and ED tech. VSS. Pt AAOx4; gait steady. Bed alarm initiated, pt educated on need for bed alarm following fall; pt verbalized understanding.

## 2024-01-29 NOTE — ED TRIAGE NOTES
"Chief Complaint   Patient presents with    Flu Like Symptoms     BIBA from home for fevers, chills, HA, nausea, and SOB x 2 days. On scene oral temperature 101.7F, . Pt reports hx of DM1. Pt states her family was sick back in December 2023. Pt arrives with oral temperature 100.4F, GCS 15.      Medications en route: 400 LR. Pt self medicated with 1.5g tylenol PTA.     BP (!) 185/86   Pulse (!) 101   Temp 38 °C (100.4 °F) (Oral)   Resp 18   Ht 1.651 m (5' 5\")   Wt 72.6 kg (160 lb)   LMP 01/01/2002   SpO2 91%   BMI 26.63 kg/m²     "

## 2024-01-29 NOTE — ED NOTES
Pt oral temperature 101.9F. Pt reports she did not take tylenol PTA. Pt medicated with 1g Tylenol per MAR.

## 2024-01-29 NOTE — ED NOTES
Repeat . Pt resting comfortably, respirations even and unlabored. NAD noted. VSS. Bed alarm on, call light within reach

## 2024-01-29 NOTE — ED NOTES
Discharge education provided. Patient verbalizes understanding. Patient A/0x4 and ambulatory to the lobby with a steady gait. Patient discharged home to self care.   Pt to schedule transpiration home via MTM

## 2024-01-29 NOTE — ED NOTES
"0025: Pt pressed call light to alert staff she felt her sugar was high. Pt advised that this RN would step outside to grab glucometer and be back momentarily.   0030: Pt found face down on floor by this RN and ED tech at 0030. Bloody nose noted, ERP notified. Pt reports she \"did not hear\" this RN say she would be back to check her BGL, so she got up. Pt reports she tripped on her own feet and fell face forward. Pt reports 5/10 pain to her nose bridge. Scant amount of blood noted on pts nostrils; bleeding controlled. No lacerations, hematomas, or abrasions noted to face, head, neck, or other body parts.             "

## 2024-01-29 NOTE — ED NOTES
Pt placed in lobby. Pt calling [facility] for ride. Triage tech alerted that pt will be out there waiting for ride

## 2024-01-29 NOTE — ED PROVIDER NOTES
ED Provider Note    CHIEF COMPLAINT  Chief Complaint   Patient presents with    Flu Like Symptoms     BIBA from home for fevers, chills, HA, nausea, and SOB x 2 days. On scene oral temperature 101.7F, . Pt reports hx of DM1. Pt states her family was sick back in December 2023. Pt arrives with oral temperature 100.4F, GCS 15.      EXTERNAL RECORDS REVIEWED  External ED Note from 12/22/2023 when the patient was seen for generalized bodyaches, chills, cough and subjective fevers of 5 days duration.  She had positive COVID exposures and in the ER had some headache with no concerning vital sign abnormalities or hypoxia.  Glucose was noted as being 362, she given small amount of fluid bolus .  She had a white count of 5, no significant abnormalities beyond initial elevated glucose that after fluids was downtrending.  The rapid viral testing was initially negative and she was treated as presumptive viral illness.  Chest x-ray showed no focal infiltrates.  Bonded well to Toradol and Zofran and fluids    HPI/ROS  LIMITATION TO HISTORY   Select: : None  OUTSIDE HISTORIAN(S):  none    Yuki Esperanza Riddle is a 58 y.o. female who presents to the emergency room for concerns regarding body aches, chills, cough and had some headaches and nausea.  She has had subjective fevers and chills and eventually was taking her temperature and was found to have a temp of 102.  She been feeling unwell similar to how she was feeling in December when she and her family members had a substantial viral illness.  She does not have any neck pain, has not noticed any productive cough, has not had any abdominal discomfort or distention and denies any urinary symptoms or bowel changes.    She is a type I diabetic, last blood sugar was 368, temp on scene per medics was 101.7.  Recent medication changes.  She arrives alert and oriented and in moderate discomfort.    PAST MEDICAL HISTORY   has a past medical history of Arthritis, Cataract, COPD  (chronic obstructive pulmonary disease) (HCC), Diabetes, Diabetic neuropathy (HCC), Diabetic retinopathy, Diabetic retinopathy (HCC), Fibromyalgia, Hypertension, Migraine, POTS (postural orthostatic tachycardia syndrome), Recurrent UTI, Rheumatoid arthritis (HCC), Rheumatoid arthritis (HCC), Sleep apnea, and TIA (transient ischemic attack).    SURGICAL HISTORY   has a past surgical history that includes tonsillectomy; cholecystectomy; primary c section; other; foreign body removal (6/18/2013); gastroscopy with balloon dilatation (2/13/2015); and gastroscopy with biopsy (2/13/2015).    FAMILY HISTORY  Family History   Problem Relation Age of Onset    No Known Problems Sister     No Known Problems Brother     No Known Problems Brother     No Known Problems Daughter     No Known Problems Daughter        SOCIAL HISTORY  Social History     Tobacco Use    Smoking status: Former     Current packs/day: 0.50     Average packs/day: 0.5 packs/day for 29.0 years (14.5 ttl pk-yrs)     Types: Cigarettes    Smokeless tobacco: Never    Tobacco comments:     15-18 cigarettes a day    Vaping Use    Vaping Use: Some days    Substances: Nicotine   Substance and Sexual Activity    Alcohol use: No    Drug use: No    Sexual activity: Not on file       CURRENT MEDICATIONS  Home Medications       Reviewed by Pam Pettit R.N. (Registered Nurse) on 01/28/24 at 2155  Med List Status: Not Addressed     Medication Last Dose Status   B-D ULTRAFINE III SHORT PEN  Active   buPROPion SR (WELLBUTRIN-SR) 150 MG TABLET SR 12 HR sustained-release tablet  Active   diazepam (VALIUM) 10 MG tablet  Active   gabapentin (NEURONTIN) 600 MG tablet  Active   HUMALOG KWIKPEN 100 UNIT/ML Solution Pen-injector injection  Active   HYDROcodone/acetaminophen (NORCO)  MG Tab  Active   hydrOXYzine HCl (ATARAX) 25 MG Tab  Active   Naloxone (NARCAN) 4 MG/0.1ML Liquid  Active   Non Formulary Request  Active   ONETOUCH ULTRA strip  Active   oxyCODONE CR  "(OXYCONTIN) 20 MG Tablet Extended Release 12 hour Abuse-Deterrent  Active   promethazine (PHENERGAN) 25 MG Tab  Active   sennosides (SENOKOT) 8.6 MG Tab  Active   Simethicone (GAS-X PO)  Active   TOUJEO SOLOSTAR 300 UNIT/ML Solution Pen-injector  Active   triamcinolone acetonide (KENALOG) 0.1 % Cream  Active                    ALLERGIES  Allergies   Allergen Reactions    Compazine      Aggressive behavior     Sulfa Drugs Itching and Rash     Blotchy spots on chest     PHYSICAL EXAM  VITAL SIGNS: BP (!) 155/74   Pulse 85   Temp 37.6 °C (99.7 °F) (Oral)   Resp (!) 39   Ht 1.651 m (5' 5\")   Wt 72.6 kg (160 lb)   LMP 01/01/2002   SpO2 97%   BMI 26.63 kg/m²    Genl: F sitting in gurney uncomfortably, speaking clearly, appears sick but non-toxic,in no acute distress   Head: NC/AT   ENT: Mucous membranes dry, posterior pharynx thematous, no exudates, uvula midline, nares patent bilaterally   Eyes: Normal sclera, pupils equal round reactive to light  Neck: Supple, FROM, no LAD appreciated   Pulmonary: Lungs are clear to auscultation bilaterally  Chest: No TTP  CV:  tachycardia, no murmur appreciated, pulses 2+ in both upper and lower extremities,  Abdomen: soft, NT/ND; no rebound/guarding, no masses palpated, no HSM   : no CVA or suprapubic tenderness   Musculoskeletal: Pain free ROM of the neck. Moving upper and lower extremities in spontaneous and coordinated fashion  Neuro: A&Ox4 (person, place, time, situation), speech fluent, gait not assessed, no focal deficits appreciated  Skin: No rash or lesions.  No pallor or jaundice.  No cyanosis.  Warm and dry.     DIAGNOSTIC STUDIES / PROCEDURES    LABS  Labs Reviewed   CBC WITH DIFFERENTIAL - Abnormal; Notable for the following components:       Result Value    WBC 3.8 (*)     RBC 3.83 (*)     Hematocrit 36.1 (*)     Neutrophils-Polys 86.30 (*)     Lymphocytes 5.30 (*)     Lymphs (Absolute) 0.20 (*)     All other components within normal limits   COMP METABOLIC " PANEL - Abnormal; Notable for the following components:    Glucose 367 (*)     AST(SGOT) 113 (*)     ALT(SGPT) 101 (*)     Alkaline Phosphatase 167 (*)     All other components within normal limits   URINALYSIS - Abnormal; Notable for the following components:    Glucose >=1000 (*)     Ketones 15 (*)     Protein 300 (*)     Leukocyte Esterase Small (*)     Occult Blood Small (*)     All other components within normal limits   VENOUS BLOOD GAS - Abnormal; Notable for the following components:    Venous Bg Pco2 32.0 (*)     Venous Bg Pco2 Temp Corrected 34.8 (*)     Venous Bg Po2 Temp Corrected 41.0 (*)     Venous Bg Hco3 21 (*)     All other components within normal limits   ESTIMATED GFR - Abnormal; Notable for the following components:    GFR (CKD-EPI) 56 (*)     All other components within normal limits   URINE MICROSCOPIC (W/UA) - Abnormal; Notable for the following components:    WBC 10-20 (*)     All other components within normal limits   POC COV-2, FLU A/B, RSV BY PCR - Abnormal; Notable for the following components:    POC Influenza A RNA, PCR POSITIVE (*)     All other components within normal limits   POCT GLUCOSE DEVICE RESULTS - Abnormal; Notable for the following components:    POC Glucose, Blood 365 (*)     All other components within normal limits   POCT GLUCOSE DEVICE RESULTS - Abnormal; Notable for the following components:    POC Glucose, Blood 253 (*)     All other components within normal limits   COV-2, FLU A/B, AND RSV BY PCR (CEPHEID)   LACTIC ACID     RADIOLOGY  I have independently interpreted the diagnostic imaging associated with this visit and am waiting the final reading from the radiologist.   My preliminary interpretation is as follows: No cardiopulmonary process.  Patient is noted.  Radiologist interpretation:   DX-CHEST-PORTABLE (1 VIEW)   Final Result      1.  Hypoinflation and probable minimal pulmonary edema.   2.  No lobar pneumonia or pneumothorax.        COURSE & MEDICAL  DECISION MAKING    ED Observation Status? No; Patient does not meet criteria for ED Observation.     INITIAL ASSESSMENT, COURSE AND PLAN  Care Narrative: Patient presents emergency room for symptoms as described above.  The patient has been having 2 days of multiple complaints including body aches chills, sore throat with cough and overall has symptoms consistent with a viral illness.  With her tachycardia and reported fever concern be for septic etiology and initial blood work was obtained.    I was notified by nursing staff at 12:35 AM that the patient had gotten up and fell, had no loss of consciousness, had some nasal pain and no trunk or extremity discomfort.  She feels very light on her feet, no palpitations or chest pains, clinical exam showed dried blood at the nares, no headaches, neck pain, no chest wall discomfort.     Lab work showed a leukopenia, no acute anemic changes and a glucose of 367 with no anion gap or bicarb changes.  Nonspecific LFT elevations with no signs of obstructive hepatobiliary process.  Her lactate is not elevated and is unlikely that this to be a evolving bacterial illness.  Her urinalysis shows some ketones likely due to her dehydrated state from vomiting and decreased p.o. intake.  No evidence of acute infectious etiology or stone pathology.  She has not had any urinary symptoms.  Eventually viral panel came back showing positive influenza A.  She was treated with IV fluids, dose of insulin as she has not taken her nighttime dose, Tylenol and Toradol.  She is continue to have some body aches and chills and is very anxious about the whole process and I had started her on Tamiflu, hydroxyzine small single dose of Ativan while she was here.  She was transition from IV to p.o. fluids at this time the plan is for discharge home.  She is allowed to sleep and metabolize and serial point-of-care glucoses are obtained and are downward trending.    She will follow-up with her outpatient  physician as needed, will take anti-inflammatory medications for her myalgias and can be discharged home in stable condition.    HYDRATION: Based on the patient's presentation of Dehydration, Hyperglycemia, and Tachycardia the patient was given IV fluids. IV Hydration was used because oral hydration was not adequate alone. Upon recheck following hydration, the patient was improved.    DISPOSITION AND DISCUSSIONS  I have discussed management of the patient with the following physicians and TRICIA's:  none    Discussion of management with other QHP or appropriate source(s): None     Escalation of care considered, and ultimately not performed:acute inpatient care management, however at this time, the patient is most appropriate for outpatient management    Decision tools and prescription drugs considered including, but not limited to: Antivirals tamiflu .    FINAL DIAGNOSIS  1. Influenza A    2. Myalgia    3. Viral syndrome    4. Hyperglycemia    5. Nausea      Electronically signed by: Edgar Castaneda M.D., 1/28/2024 10:04 PM

## 2024-02-22 NOTE — ED NOTES
"Med rec updated and complete  Allergies reviewed  Pt states \"No antibiotics in the last 30 days\".  Pt states \"No vitamins\".    "
Assessment complete. Tech at BS to start IV and draw labs. Will medicate pt after. Urine collected and sent.  
ERP at BS  
IV started. Labs drawn and sent. Medicated pt per ERP order. US at BS  
Pt D/C to home. IV removed. D/C instructions and prescriptions given. Pt v/u. Pt leaves ED with no acute changes, complaints or concerns.  
Pt c/o L sided pelvic pain, vaginal bleeding (4 times yesterday, bright red blood with little clots), L flank pain and L leg pain that starts at her hip and goes to her knee. Pt states hasnt had a period since age 33. Also states on Friday had fever. Scored 3 on sepsis scale. ERP at BS  
Patient states no history